# Patient Record
Sex: FEMALE | Race: WHITE | NOT HISPANIC OR LATINO | Employment: OTHER | URBAN - METROPOLITAN AREA
[De-identification: names, ages, dates, MRNs, and addresses within clinical notes are randomized per-mention and may not be internally consistent; named-entity substitution may affect disease eponyms.]

---

## 2023-09-04 PROBLEM — M19.90 OSTEOARTHRITIS: Status: ACTIVE | Noted: 2023-09-04

## 2023-09-04 PROBLEM — E78.5 HYPERLIPIDEMIA: Status: ACTIVE | Noted: 2023-09-04

## 2023-09-04 PROBLEM — M10.9 GOUT: Status: ACTIVE | Noted: 2023-09-04

## 2023-09-04 PROBLEM — N95.0 POSTMENOPAUSAL BLEEDING: Status: ACTIVE | Noted: 2023-09-04

## 2023-09-04 PROBLEM — N95.1 MENOPAUSAL STATE: Status: ACTIVE | Noted: 2023-09-04

## 2023-09-04 PROBLEM — I10 BENIGN ESSENTIAL HYPERTENSION: Status: ACTIVE | Noted: 2023-09-04

## 2023-09-04 PROBLEM — J45.901 EXACERBATION OF ASTHMA (HHS-HCC): Status: ACTIVE | Noted: 2023-09-04

## 2023-09-04 PROBLEM — N81.11 CYSTOCELE, MIDLINE: Status: ACTIVE | Noted: 2023-09-04

## 2023-09-04 PROBLEM — E03.9 HYPOTHYROIDISM: Status: ACTIVE | Noted: 2023-09-04

## 2023-09-04 PROBLEM — E78.5 DYSLIPIDEMIA: Status: ACTIVE | Noted: 2023-09-04

## 2023-09-04 PROBLEM — E55.9 VITAMIN D DEFICIENCY: Status: ACTIVE | Noted: 2023-09-04

## 2023-09-04 PROBLEM — N81.4 UTEROVAGINAL PROLAPSE: Status: ACTIVE | Noted: 2023-09-04

## 2023-09-04 RX ORDER — BUDESONIDE AND FORMOTEROL FUMARATE DIHYDRATE 160; 4.5 UG/1; UG/1
2 AEROSOL RESPIRATORY (INHALATION)
COMMUNITY

## 2023-09-04 RX ORDER — TRIAMTERENE AND HYDROCHLOROTHIAZIDE 75; 50 MG/1; MG/1
0.5 TABLET ORAL DAILY
COMMUNITY
Start: 2014-04-16 | End: 2023-12-07

## 2023-09-04 RX ORDER — METOPROLOL TARTRATE 50 MG/1
50 TABLET ORAL 2 TIMES DAILY
COMMUNITY

## 2023-09-04 RX ORDER — HYDROCODONE BITARTRATE AND ACETAMINOPHEN 5; 325 MG/1; MG/1
1 TABLET ORAL EVERY 6 HOURS PRN
COMMUNITY
End: 2023-12-19 | Stop reason: ENTERED-IN-ERROR

## 2023-09-04 RX ORDER — DOCUSATE SODIUM 100 MG/1
100 CAPSULE, LIQUID FILLED ORAL 2 TIMES DAILY
COMMUNITY
End: 2023-12-19 | Stop reason: ENTERED-IN-ERROR

## 2023-09-04 RX ORDER — LEVOTHYROXINE SODIUM 25 UG/1
25 TABLET ORAL
COMMUNITY
End: 2024-04-17

## 2023-09-04 RX ORDER — COLCHICINE 0.6 MG/1
0.6 TABLET ORAL EVERY OTHER DAY
COMMUNITY

## 2023-09-04 RX ORDER — POTASSIUM CHLORIDE 20 MEQ/1
20 TABLET, EXTENDED RELEASE ORAL DAILY
COMMUNITY
Start: 2020-12-15 | End: 2023-12-28 | Stop reason: SDUPTHER

## 2023-09-04 RX ORDER — IPRATROPIUM BROMIDE 42 UG/1
1 SPRAY, METERED NASAL 3 TIMES DAILY
COMMUNITY

## 2023-09-04 RX ORDER — ALBUTEROL SULFATE 0.83 MG/ML
SOLUTION RESPIRATORY (INHALATION) EVERY 6 HOURS
COMMUNITY

## 2023-09-04 RX ORDER — FEBUXOSTAT 80 MG/1
80 TABLET, FILM COATED ORAL 3 TIMES WEEKLY
COMMUNITY
End: 2023-12-19 | Stop reason: ENTERED-IN-ERROR

## 2023-09-04 RX ORDER — IBUPROFEN 200 MG
3 TABLET ORAL EVERY 6 HOURS PRN
COMMUNITY

## 2023-09-04 RX ORDER — NYSTATIN 100000 [USP'U]/ML
4 SUSPENSION ORAL 4 TIMES DAILY
COMMUNITY
Start: 2021-04-15 | End: 2023-12-19 | Stop reason: ENTERED-IN-ERROR

## 2023-09-04 RX ORDER — SIMVASTATIN 40 MG/1
40 TABLET, FILM COATED ORAL NIGHTLY
COMMUNITY
End: 2024-02-23

## 2023-12-15 ENCOUNTER — LAB (OUTPATIENT)
Dept: LAB | Facility: LAB | Age: 78
End: 2023-12-15
Payer: MEDICARE

## 2023-12-15 DIAGNOSIS — M10.9 GOUT, UNSPECIFIED: Primary | ICD-10-CM

## 2023-12-15 LAB
ANION GAP SERPL CALC-SCNC: 13 MMOL/L
BUN SERPL-MCNC: 19 MG/DL (ref 8–25)
CALCIUM SERPL-MCNC: 10.1 MG/DL (ref 8.5–10.4)
CHLORIDE SERPL-SCNC: 104 MMOL/L (ref 97–107)
CO2 SERPL-SCNC: 26 MMOL/L (ref 24–31)
CREAT SERPL-MCNC: 1.2 MG/DL (ref 0.4–1.6)
GFR SERPL CREATININE-BSD FRML MDRD: 47 ML/MIN/1.73M*2
GLUCOSE SERPL-MCNC: 107 MG/DL (ref 65–99)
POTASSIUM SERPL-SCNC: 4 MMOL/L (ref 3.4–5.1)
SODIUM SERPL-SCNC: 143 MMOL/L (ref 133–145)
URATE SERPL-MCNC: 5.4 MG/DL (ref 2.5–6.8)

## 2023-12-15 PROCEDURE — 36415 COLL VENOUS BLD VENIPUNCTURE: CPT

## 2023-12-15 PROCEDURE — 84550 ASSAY OF BLOOD/URIC ACID: CPT

## 2023-12-15 PROCEDURE — 80048 BASIC METABOLIC PNL TOTAL CA: CPT

## 2023-12-17 ASSESSMENT — ENCOUNTER SYMPTOMS
SHORTNESS OF BREATH: 0
ABDOMINAL PAIN: 0
FEVER: 0

## 2023-12-17 NOTE — PROGRESS NOTES
[facility]: [facility]  PROGRESS NOTE      Irma Baumann is a 78 y.o. female that is presenting today for Follow-up (Needs med refills).    Assessment/Plan   Diagnoses and all orders for this visit:  Benign essential hypertension  -     CBC and Auto Differential; Future  -     Comprehensive Metabolic Panel; Future  -     Lipid Panel; Future  -     TSH with reflex to Free T4 if abnormal; Future  Mixed hyperlipidemia  -     Lipid Panel; Future  Other secondary gout, unspecified chronicity, unspecified site  Hypothyroidism, unspecified type  -     TSH with reflex to Free T4 if abnormal; Future  Vitamin D deficiency  -     Vitamin D 25-Hydroxy,Total (for eval of Vitamin D levels); Future  Osteoarthritis, unspecified osteoarthritis type, unspecified site  Hyperglycemia  -     Hemoglobin A1C; Future  Encounter for screening mammogram for breast cancer  Asthma, unspecified asthma severity, unspecified whether complicated, unspecified whether persistent  -     predniSONE (Deltasone) 10 mg tablet; Take 5 tablets (50 mg) by mouth once daily for 2 days, THEN 4 tablets (40 mg) once daily for 2 days, THEN 3 tablets (30 mg) once daily for 2 days, THEN 2 tablets (20 mg) once daily for 2 days, THEN 1 tablet (10 mg) once daily for 2 days.  Other orders  -     Follow Up In Primary Care - Medicare Annual; Future    Continue meds as is.    Lost her allergist - I can refill the inhalers and she usually keeps the steroid on  hand in case of a flare of asthma, will give Rx      Subjective   HPI  78 y.o. female here for follow up.  Doing well - no gout flares.    Review of Systems   Constitutional:  Negative for fever.   Respiratory:  Negative for shortness of breath.    Cardiovascular:  Negative for chest pain.   Gastrointestinal:  Negative for abdominal pain.   All other systems reviewed and are negative.     Objective   Vitals:    12/19/23 0927   BP: 112/80   Pulse: 54   SpO2: 98%      Body mass index is 29.74  "kg/m².  Physical Exam  Vitals reviewed.   Constitutional:       Appearance: Normal appearance.   Cardiovascular:      Rate and Rhythm: Normal rate and regular rhythm.      Heart sounds: No murmur heard.  Pulmonary:      Breath sounds: Normal breath sounds. No wheezing, rhonchi or rales.   Musculoskeletal:      Right lower leg: No edema.      Left lower leg: No edema.       Diagnostic Results   Lab Results   Component Value Date    GLUCOSE 107 (H) 12/15/2023    CALCIUM 10.1 12/15/2023     12/15/2023    K 4.0 12/15/2023    CO2 26 12/15/2023     12/15/2023    BUN 19 12/15/2023    CREATININE 1.20 12/15/2023     Lab Results   Component Value Date    ALT 17 06/15/2023    AST 22 06/15/2023    ALKPHOS 92 06/15/2023    BILITOT 0.7 06/15/2023     Lab Results   Component Value Date    WBC 7.2 06/15/2023    HGB 15.5 (H) 06/15/2023    HCT 46.9 (H) 06/15/2023    MCV 88.2 06/15/2023     06/15/2023     Lab Results   Component Value Date    CHOL 118 (L) 06/15/2023    CHOL 111 (L) 06/13/2022    CHOL 122 (L) 06/26/2021     Lab Results   Component Value Date    HDL 44 (L) 06/15/2023    HDL 43 (L) 06/13/2022    HDL 43 (L) 06/26/2021     Lab Results   Component Value Date    LDLCALC 47 (L) 06/15/2023    LDLCALC 46 (L) 06/13/2022    LDLCALC 56 (L) 06/26/2021     Lab Results   Component Value Date    TRIG 134 06/15/2023    TRIG 109 06/13/2022    TRIG 116 06/26/2021     No components found for: \"CHOLHDL\"  Lab Results   Component Value Date    HGBA1C 5.8 06/13/2022     Other labs not included in the list above were reviewed either before or during this encounter.    History    Past Medical History:   Diagnosis Date    Personal history of other diseases of the circulatory system     History of hypertension    Personal history of other diseases of the respiratory system     History of asthma     Past Surgical History:   Procedure Laterality Date    OTHER SURGICAL HISTORY  11/30/2022    Hysterectomy    OTHER SURGICAL HISTORY  " 11/30/2022    Sinus surgery     Family History   Problem Relation Name Age of Onset    Hypertension Mother      Other (cva) Mother      Diabetes Mother      Stroke Mother      Hypertension Father      Other (cva) Father      Stroke Father      Diabetes Sister      Heart disease Brother      No Known Problems Son      No Known Problems Son       Social History     Socioeconomic History    Marital status:      Spouse name: Not on file    Number of children: Not on file    Years of education: Not on file    Highest education level: Not on file   Occupational History    Not on file   Tobacco Use    Smoking status: Never    Smokeless tobacco: Never   Vaping Use    Vaping Use: Never used   Substance and Sexual Activity    Alcohol use: Yes     Comment: sometimes    Drug use: Never    Sexual activity: Not on file   Other Topics Concern    Not on file   Social History Narrative    Not on file     Social Determinants of Health     Financial Resource Strain: Not on file   Food Insecurity: Not on file   Transportation Needs: Not on file   Physical Activity: Not on file   Stress: Not on file   Social Connections: Not on file   Intimate Partner Violence: Not on file   Housing Stability: Not on file     Allergies   Allergen Reactions    Levofloxacin Other     Reaction: Muscle Pain/myalgia    Allopurinol Other     Reaction: erytheme    Cephalexin Unknown    Ciprofloxacin Unknown    Sulfamethoxazole-Trimethoprim Unknown    Colchicine Other     Reaction: Skin irritation    Penicillins Rash     Reaction: Hives     Current Outpatient Medications on File Prior to Visit   Medication Sig Dispense Refill    albuterol 108 (90 Base) MCG/ACT inhaler Inhale 2 puffs every 6 hours if needed.      albuterol 2.5 mg /3 mL (0.083 %) nebulizer solution every 6 hours.  3 ml as needed Inhalation      budesonide-formoteroL (Symbicort) 160-4.5 mcg/actuation inhaler Inhale 2 puffs 2 times a day.      cholecalciferol, vitamin D3, (VITAMIN D3 ORAL)  Take 1 capsule by mouth once daily.      colchicine, gout, (Colcrys) 0.6 mg tablet Take 1 tablet (0.6 mg) by mouth every other day. Gout      ibuprofen (AdviL) 200 mg tablet Take 3 tablets (600 mg) by mouth every 6 hours if needed (pain).      ipratropium (Atrovent) 42 mcg (0.06 %) nasal spray 1 spray 3 times a day.      levothyroxine (Synthroid, Levoxyl) 25 mcg tablet Take 1 tablet (25 mcg) by mouth once daily in the morning. Take before meals.      metoprolol tartrate (Lopressor) 50 mg tablet Take 1 tablet by mouth 2 times a day.      potassium chloride CR (Klor-Con M20) 20 mEq ER tablet Take 1 tablet (20 mEq) by mouth once daily. With food      simvastatin (Zocor) 40 mg tablet Take 1 tablet (40 mg) by mouth once daily at bedtime.      triamterene-hydrochlorothiazid (Maxzide) 75-50 mg tablet TAKE ONE-HALF TABLET BY MOUTH  ONCE DAILY 45 tablet 3    [DISCONTINUED] docusate sodium (Colace) 100 mg capsule Take 1 capsule (100 mg) by mouth 2 times a day.      [DISCONTINUED] febuxostat (Uloric) 80 mg tablet Take 1 tablet (80 mg) by mouth 3 times a week.      [DISCONTINUED] HYDROcodone-acetaminophen (Norco) 5-325 mg tablet Take 1 tablet by mouth every 6 hours if needed (pain).      [DISCONTINUED] nystatin (Mycostatin) 100,000 unit/mL suspension 4 mL (400,000 Units) 4 times a day. Mouth/Throat       No current facility-administered medications on file prior to visit.     Immunization History   Administered Date(s) Administered    Flu vaccine, quadrivalent, high-dose, preservative free, age 65y+ (FLUZONE) 10/18/2021, 10/18/2022    Influenza, High Dose Seasonal, Preservative Free 10/22/2012, 09/19/2016, 09/21/2017, 09/29/2017, 10/04/2018    Influenza, Seasonal, Quadrivalent, Adjuvanted 10/02/2020    Influenza, seasonal, injectable 10/31/2008, 10/12/2010, 10/06/2011, 10/07/2013, 10/13/2014, 10/01/2015    Influenza, trivalent, adjuvanted 10/11/2018, 10/16/2019    Pfizer COVID-19 vaccine, bivalent, age 12 years and older (30  mcg/0.3 mL) 09/26/2022    Pfizer Gray Cap SARS-CoV-2 05/16/2022    Pfizer Purple Cap SARS-CoV-2 02/05/2021, 02/28/2021, 10/04/2021    Pneumococcal conjugate vaccine, 13-valent (PREVNAR 13) 10/01/2015    Pneumococcal polysaccharide vaccine, 23-valent, age 2 years and older (PNEUMOVAX 23) 12/31/2003, 04/07/2014     Patient's medical history was reviewed and updated either before or during this encounter.       Toro Everett MD

## 2023-12-19 ENCOUNTER — OFFICE VISIT (OUTPATIENT)
Dept: PRIMARY CARE | Facility: CLINIC | Age: 78
End: 2023-12-19
Payer: MEDICARE

## 2023-12-19 VITALS
SYSTOLIC BLOOD PRESSURE: 112 MMHG | WEIGHT: 160 LBS | HEART RATE: 54 BPM | DIASTOLIC BLOOD PRESSURE: 80 MMHG | OXYGEN SATURATION: 98 % | BODY MASS INDEX: 29.74 KG/M2

## 2023-12-19 DIAGNOSIS — M19.90 OSTEOARTHRITIS, UNSPECIFIED OSTEOARTHRITIS TYPE, UNSPECIFIED SITE: ICD-10-CM

## 2023-12-19 DIAGNOSIS — Z12.31 ENCOUNTER FOR SCREENING MAMMOGRAM FOR BREAST CANCER: ICD-10-CM

## 2023-12-19 DIAGNOSIS — J45.909 ASTHMA, UNSPECIFIED ASTHMA SEVERITY, UNSPECIFIED WHETHER COMPLICATED, UNSPECIFIED WHETHER PERSISTENT (HHS-HCC): ICD-10-CM

## 2023-12-19 DIAGNOSIS — E78.2 MIXED HYPERLIPIDEMIA: ICD-10-CM

## 2023-12-19 DIAGNOSIS — E55.9 VITAMIN D DEFICIENCY: ICD-10-CM

## 2023-12-19 DIAGNOSIS — I10 BENIGN ESSENTIAL HYPERTENSION: Primary | ICD-10-CM

## 2023-12-19 DIAGNOSIS — R73.9 HYPERGLYCEMIA: ICD-10-CM

## 2023-12-19 DIAGNOSIS — E03.9 HYPOTHYROIDISM, UNSPECIFIED TYPE: ICD-10-CM

## 2023-12-19 DIAGNOSIS — M10.40 OTHER SECONDARY GOUT, UNSPECIFIED CHRONICITY, UNSPECIFIED SITE: ICD-10-CM

## 2023-12-19 PROCEDURE — 1159F MED LIST DOCD IN RCRD: CPT | Performed by: INTERNAL MEDICINE

## 2023-12-19 PROCEDURE — 1126F AMNT PAIN NOTED NONE PRSNT: CPT | Performed by: INTERNAL MEDICINE

## 2023-12-19 PROCEDURE — 99214 OFFICE O/P EST MOD 30 MIN: CPT | Performed by: INTERNAL MEDICINE

## 2023-12-19 PROCEDURE — 3079F DIAST BP 80-89 MM HG: CPT | Performed by: INTERNAL MEDICINE

## 2023-12-19 PROCEDURE — 1157F ADVNC CARE PLAN IN RCRD: CPT | Performed by: INTERNAL MEDICINE

## 2023-12-19 PROCEDURE — 1036F TOBACCO NON-USER: CPT | Performed by: INTERNAL MEDICINE

## 2023-12-19 PROCEDURE — 1160F RVW MEDS BY RX/DR IN RCRD: CPT | Performed by: INTERNAL MEDICINE

## 2023-12-19 PROCEDURE — 3074F SYST BP LT 130 MM HG: CPT | Performed by: INTERNAL MEDICINE

## 2023-12-19 RX ORDER — PREDNISONE 10 MG/1
TABLET ORAL
Qty: 30 TABLET | Refills: 0 | Status: SHIPPED | OUTPATIENT
Start: 2023-12-19 | End: 2023-12-29

## 2023-12-19 ASSESSMENT — ENCOUNTER SYMPTOMS
OCCASIONAL FEELINGS OF UNSTEADINESS: 1
LOSS OF SENSATION IN FEET: 0
DEPRESSION: 0

## 2023-12-19 ASSESSMENT — PATIENT HEALTH QUESTIONNAIRE - PHQ9
1. LITTLE INTEREST OR PLEASURE IN DOING THINGS: NOT AT ALL
SUM OF ALL RESPONSES TO PHQ9 QUESTIONS 1 AND 2: 0
2. FEELING DOWN, DEPRESSED OR HOPELESS: NOT AT ALL

## 2023-12-19 ASSESSMENT — PAIN SCALES - GENERAL: PAINLEVEL: 0-NO PAIN

## 2023-12-28 DIAGNOSIS — I10 BENIGN ESSENTIAL HYPERTENSION: ICD-10-CM

## 2023-12-28 RX ORDER — POTASSIUM CHLORIDE 20 MEQ/1
20 TABLET, EXTENDED RELEASE ORAL DAILY
Qty: 90 TABLET | Refills: 3 | Status: SHIPPED | OUTPATIENT
Start: 2023-12-28 | End: 2024-12-27

## 2023-12-28 RX ORDER — TRIAMTERENE AND HYDROCHLOROTHIAZIDE 75; 50 MG/1; MG/1
0.5 TABLET ORAL DAILY
Qty: 45 TABLET | Refills: 3 | Status: SHIPPED | OUTPATIENT
Start: 2023-12-28 | End: 2024-12-27

## 2024-02-22 DIAGNOSIS — I10 BENIGN ESSENTIAL HYPERTENSION: Primary | ICD-10-CM

## 2024-02-23 RX ORDER — SIMVASTATIN 40 MG/1
40 TABLET, FILM COATED ORAL EVERY EVENING
Qty: 90 TABLET | Refills: 3 | Status: SHIPPED | OUTPATIENT
Start: 2024-02-23

## 2024-04-16 DIAGNOSIS — E03.9 HYPOTHYROIDISM, UNSPECIFIED TYPE: Primary | ICD-10-CM

## 2024-04-17 RX ORDER — LEVOTHYROXINE SODIUM 25 UG/1
25 TABLET ORAL
Qty: 90 TABLET | Refills: 3 | Status: SHIPPED | OUTPATIENT
Start: 2024-04-17

## 2024-05-10 ENCOUNTER — LAB (OUTPATIENT)
Dept: LAB | Facility: LAB | Age: 79
End: 2024-05-10
Payer: MEDICARE

## 2024-05-10 DIAGNOSIS — I10 BENIGN ESSENTIAL HYPERTENSION: ICD-10-CM

## 2024-05-10 DIAGNOSIS — R73.9 HYPERGLYCEMIA: ICD-10-CM

## 2024-05-10 DIAGNOSIS — E55.9 VITAMIN D DEFICIENCY: ICD-10-CM

## 2024-05-10 DIAGNOSIS — M10.40 OTHER SECONDARY GOUT, UNSPECIFIED CHRONICITY, UNSPECIFIED SITE: ICD-10-CM

## 2024-05-10 DIAGNOSIS — E03.9 HYPOTHYROIDISM, UNSPECIFIED TYPE: ICD-10-CM

## 2024-05-10 DIAGNOSIS — E78.2 MIXED HYPERLIPIDEMIA: ICD-10-CM

## 2024-05-10 LAB
25(OH)D3 SERPL-MCNC: 30 NG/ML (ref 31–100)
ALBUMIN SERPL-MCNC: 4.4 G/DL (ref 3.5–5)
ALP BLD-CCNC: 89 U/L (ref 35–125)
ALT SERPL-CCNC: 15 U/L (ref 5–40)
ANION GAP SERPL CALC-SCNC: 15 MMOL/L
AST SERPL-CCNC: 21 U/L (ref 5–40)
BASOPHILS # BLD AUTO: 0.04 X10*3/UL (ref 0–0.1)
BASOPHILS NFR BLD AUTO: 0.5 %
BILIRUB SERPL-MCNC: 0.5 MG/DL (ref 0.1–1.2)
BUN SERPL-MCNC: 21 MG/DL (ref 8–25)
CALCIUM SERPL-MCNC: 10 MG/DL (ref 8.5–10.4)
CHLORIDE SERPL-SCNC: 103 MMOL/L (ref 97–107)
CHOLEST SERPL-MCNC: 122 MG/DL (ref 133–200)
CHOLEST/HDLC SERPL: 2.9 {RATIO}
CO2 SERPL-SCNC: 25 MMOL/L (ref 24–31)
CREAT SERPL-MCNC: 1.1 MG/DL (ref 0.4–1.6)
EGFRCR SERPLBLD CKD-EPI 2021: 52 ML/MIN/1.73M*2
EOSINOPHIL # BLD AUTO: 0.4 X10*3/UL (ref 0–0.4)
EOSINOPHIL NFR BLD AUTO: 5.4 %
ERYTHROCYTE [DISTWIDTH] IN BLOOD BY AUTOMATED COUNT: 13.8 % (ref 11.5–14.5)
EST. AVERAGE GLUCOSE BLD GHB EST-MCNC: 123 MG/DL
GLUCOSE SERPL-MCNC: 100 MG/DL (ref 65–99)
HBA1C MFR BLD: 5.9 %
HCT VFR BLD AUTO: 46.7 % (ref 36–46)
HDLC SERPL-MCNC: 42 MG/DL
HGB BLD-MCNC: 15.4 G/DL (ref 12–16)
IMM GRANULOCYTES # BLD AUTO: 0.02 X10*3/UL (ref 0–0.5)
IMM GRANULOCYTES NFR BLD AUTO: 0.3 % (ref 0–0.9)
LDLC SERPL CALC-MCNC: 54 MG/DL (ref 65–130)
LYMPHOCYTES # BLD AUTO: 2.2 X10*3/UL (ref 0.8–3)
LYMPHOCYTES NFR BLD AUTO: 29.7 %
MCH RBC QN AUTO: 28.8 PG (ref 26–34)
MCHC RBC AUTO-ENTMCNC: 33 G/DL (ref 32–36)
MCV RBC AUTO: 88 FL (ref 80–100)
MONOCYTES # BLD AUTO: 0.54 X10*3/UL (ref 0.05–0.8)
MONOCYTES NFR BLD AUTO: 7.3 %
NEUTROPHILS # BLD AUTO: 4.2 X10*3/UL (ref 1.6–5.5)
NEUTROPHILS NFR BLD AUTO: 56.8 %
NRBC BLD-RTO: 0 /100 WBCS (ref 0–0)
PLATELET # BLD AUTO: 283 X10*3/UL (ref 150–450)
POTASSIUM SERPL-SCNC: 3.9 MMOL/L (ref 3.4–5.1)
PROT SERPL-MCNC: 7.1 G/DL (ref 5.9–7.9)
RBC # BLD AUTO: 5.34 X10*6/UL (ref 4–5.2)
SODIUM SERPL-SCNC: 143 MMOL/L (ref 133–145)
TRIGL SERPL-MCNC: 131 MG/DL (ref 40–150)
TSH SERPL DL<=0.05 MIU/L-ACNC: 4.12 MIU/L (ref 0.27–4.2)
WBC # BLD AUTO: 7.4 X10*3/UL (ref 4.4–11.3)

## 2024-05-10 PROCEDURE — 84550 ASSAY OF BLOOD/URIC ACID: CPT

## 2024-05-10 PROCEDURE — 85025 COMPLETE CBC W/AUTO DIFF WBC: CPT

## 2024-05-10 PROCEDURE — 84443 ASSAY THYROID STIM HORMONE: CPT

## 2024-05-10 PROCEDURE — 36415 COLL VENOUS BLD VENIPUNCTURE: CPT

## 2024-05-10 PROCEDURE — 80061 LIPID PANEL: CPT

## 2024-05-10 PROCEDURE — 82306 VITAMIN D 25 HYDROXY: CPT

## 2024-05-10 PROCEDURE — 83036 HEMOGLOBIN GLYCOSYLATED A1C: CPT

## 2024-05-10 PROCEDURE — 80053 COMPREHEN METABOLIC PANEL: CPT

## 2024-05-13 LAB — URATE SERPL-MCNC: 5.4 MG/DL (ref 2.5–6.8)

## 2024-05-13 ASSESSMENT — ENCOUNTER SYMPTOMS
DIARRHEA: 0
FEVER: 0
APPETITE CHANGE: 0
VOMITING: 0
CHILLS: 0
COUGH: 0
HEADACHES: 0
ABDOMINAL PAIN: 0
SHORTNESS OF BREATH: 0
NAUSEA: 0

## 2024-05-13 NOTE — PROGRESS NOTES
[facility]: [facility]  MEDICARE WELLNESS EXAM      Irma Baumann is a 78 y.o. female that is presenting today for Annual Exam (CPE).    Assessment/Plan    Diagnoses and all orders for this visit:  Annual physical exam  Benign essential hypertension  Mixed hyperlipidemia  -     CBC and Auto Differential; Future  -     Comprehensive Metabolic Panel; Future  -     Lipid Panel; Future  -     TSH with reflex to Free T4 if abnormal; Future  Hypothyroidism, unspecified type  Other secondary gout, unspecified chronicity, unspecified site  -     Uric Acid; Future  -     Uric acid; Future  Asthma, unspecified asthma severity, unspecified whether complicated, unspecified whether persistent ([facility])  Vitamin D deficiency  -     Vitamin D 25-Hydroxy,Total (for eval of Vitamin D levels); Future  Encounter for routine laboratory testing  -     CBC and Auto Differential; Future  -     Comprehensive Metabolic Panel; Future  -     Lipid Panel; Future  -     Hemoglobin A1C; Future  -     Vitamin D 25-Hydroxy,Total (for eval of Vitamin D levels); Future  -     TSH with reflex to Free T4 if abnormal; Future  -     Uric Acid; Future  Hyperglycemia  -     Hemoglobin A1C; Future  Gout, unspecified cause, unspecified chronicity, unspecified site  -     febuxostat (Uloric) 80 mg tablet; Take 1 tablet (80 mg) by mouth once daily.  Dermatitis  -     triamcinolone (Kenalog) 0.5 % cream; Apply topically 2 times a day.  Other orders  -     Follow Up In Primary Care - Medicare Annual  -     Follow Up In Primary Care - Medicare Annual; Future    ADVANCED CARE PLANNING  Advanced Care Planning was discussed with patient:  The patient has an active advanced care plan on file. The patient has an active surrogate decision-maker on file.  Encouraged the patient to confirm that Living Will and Healthcare Power of  (HCPoA) are accurate and up to date.  Encouraged the patient to confirm that our office be provided a copy  of any documentation in the event that anything changes.    ACTIVITIES OF DAILY LIVING  Basic ADLs:  Bathing: Independent, Dressing: Independent, Toileting: Independent, Transferring: Independent, Continence: Independent, Feeding: Independent.    Instrumental ADLs:  Ability to use phone: Independent, Shopping: Independent, Cooking: Independent, House-keeping: Independent, Laundry: Independent, Transportation: Independent, Medication Management: Independent, Finance Management: Independent.    Subjective   HPI  Review of Systems   Constitutional:  Negative for appetite change, chills and fever.   Respiratory:  Negative for cough and shortness of breath.    Cardiovascular:  Negative for chest pain.   Gastrointestinal:  Negative for abdominal pain, diarrhea, nausea and vomiting.   Neurological:  Negative for headaches.   All other systems reviewed and are negative.    Objective   Vitals:    05/14/24 1112   BP: 128/68   Pulse: 55   Temp: 36.2 °C (97.1 °F)   SpO2: 96%      Body mass index is 29.85 kg/m².  Physical Exam  Vitals reviewed.   Constitutional:       General: She is not in acute distress.     Appearance: She is not toxic-appearing.   HENT:      Head: Normocephalic and atraumatic.      Mouth/Throat:      Mouth: Mucous membranes are moist.   Eyes:      Pupils: Pupils are equal, round, and reactive to light.   Cardiovascular:      Rate and Rhythm: Normal rate and regular rhythm.      Heart sounds: No murmur heard.  Pulmonary:      Breath sounds: Normal breath sounds. No wheezing, rhonchi or rales.   Abdominal:      General: There is no distension.      Palpations: Abdomen is soft.   Musculoskeletal:      Right lower leg: No edema.      Left lower leg: No edema.   Neurological:      General: No focal deficit present.      Mental Status: She is alert and oriented to person, place, and time.       Diagnostic Results   Lab Results   Component Value Date    GLUCOSE 100 (H) 05/10/2024    CALCIUM 10.0 05/10/2024    NA  "143 05/10/2024    K 3.9 05/10/2024    CO2 25 05/10/2024     05/10/2024    BUN 21 05/10/2024    CREATININE 1.10 05/10/2024     Lab Results   Component Value Date    ALT 15 05/10/2024    AST 21 05/10/2024    ALKPHOS 89 05/10/2024    BILITOT 0.5 05/10/2024     Lab Results   Component Value Date    WBC 7.4 05/10/2024    HGB 15.4 05/10/2024    HCT 46.7 (H) 05/10/2024    MCV 88 05/10/2024     05/10/2024     Lab Results   Component Value Date    CHOL 122 (L) 05/10/2024    CHOL 118 (L) 06/15/2023    CHOL 111 (L) 06/13/2022     Lab Results   Component Value Date    HDL 42.0 (L) 05/10/2024    HDL 44 (L) 06/15/2023    HDL 43 (L) 06/13/2022     Lab Results   Component Value Date    LDLCALC 54 (L) 05/10/2024    LDLCALC 47 (L) 06/15/2023    LDLCALC 46 (L) 06/13/2022     Lab Results   Component Value Date    TRIG 131 05/10/2024    TRIG 134 06/15/2023    TRIG 109 06/13/2022     No components found for: \"CHOLHDL\"  Lab Results   Component Value Date    HGBA1C 5.9 (H) 05/10/2024     Other labs not included in the list above reviewed either before or during this encounter.    History   Past Medical History:   Diagnosis Date    Personal history of other diseases of the circulatory system     History of hypertension    Personal history of other diseases of the respiratory system     History of asthma     Past Surgical History:   Procedure Laterality Date    OTHER SURGICAL HISTORY  11/30/2022    Hysterectomy    OTHER SURGICAL HISTORY  11/30/2022    Sinus surgery     Family History   Problem Relation Name Age of Onset    Hypertension Mother      Other (cva) Mother      Diabetes Mother      Stroke Mother      Hypertension Father      Other (cva) Father      Stroke Father      Diabetes Sister      Heart disease Brother      No Known Problems Son      No Known Problems Son       Social History     Socioeconomic History    Marital status:      Spouse name: Not on file    Number of children: Not on file    Years of " education: Not on file    Highest education level: Not on file   Occupational History    Not on file   Tobacco Use    Smoking status: Never     Passive exposure: Never    Smokeless tobacco: Never   Vaping Use    Vaping status: Never Used   Substance and Sexual Activity    Alcohol use: Yes     Comment: sometimes    Drug use: Never    Sexual activity: Not on file   Other Topics Concern    Not on file   Social History Narrative    Not on file     Social Determinants of Health     Financial Resource Strain: Not on file   Food Insecurity: Not on file   Transportation Needs: Not on file   Physical Activity: Not on file   Stress: Not on file   Social Connections: Not on file   Intimate Partner Violence: Not on file   Housing Stability: Not on file     Allergies   Allergen Reactions    Levofloxacin Other     Reaction: Muscle Pain/myalgia    Allopurinol Other     Reaction: erytheme    Cephalexin Unknown    Ciprofloxacin Unknown    Sulfamethoxazole-Trimethoprim Unknown    Colchicine Other     Reaction: Skin irritation    Penicillins Rash     Reaction: Hives     Current Outpatient Medications on File Prior to Visit   Medication Sig Dispense Refill    albuterol 108 (90 Base) MCG/ACT inhaler Inhale 2 puffs every 6 hours if needed.      albuterol 2.5 mg /3 mL (0.083 %) nebulizer solution every 6 hours.  3 ml as needed Inhalation      budesonide-formoteroL (Symbicort) 160-4.5 mcg/actuation inhaler Inhale 2 puffs 2 times a day.      cholecalciferol, vitamin D3, (VITAMIN D3 ORAL) Take 1 capsule by mouth once daily.      colchicine, gout, (Colcrys) 0.6 mg tablet Take 1 tablet (0.6 mg) by mouth every other day. Gout      econazole nitrate 1 % cream APPLY TO AFFECTED AREAS UNDER BREASTS TWICE PER DAY AS NEEDED FOR FLARES      ibuprofen (AdviL) 200 mg tablet Take 3 tablets (600 mg) by mouth every 6 hours if needed (pain).      ipratropium (Atrovent) 42 mcg (0.06 %) nasal spray 1 spray 3 times a day.      levothyroxine (Synthroid,  Levoxyl) 25 mcg tablet TAKE 1 TABLET BY MOUTH IN THE  MORNING ON AN EMPTY STOMACH 90 tablet 3    metoprolol tartrate (Lopressor) 50 mg tablet Take 1 tablet by mouth 2 times a day.      potassium chloride CR (Klor-Con M20) 20 mEq ER tablet Take 1 tablet (20 mEq) by mouth once daily. With food 90 tablet 3    simvastatin (Zocor) 40 mg tablet TAKE 1 TABLET BY MOUTH ONCE  DAILY IN THE EVENING 90 tablet 3    triamterene-hydrochlorothiazid (Maxzide) 75-50 mg tablet Take 0.5 tablets by mouth once daily. 45 tablet 3    [DISCONTINUED] febuxostat (Uloric) 80 mg tablet Take 1 tablet (80 mg) by mouth once daily.       No current facility-administered medications on file prior to visit.     Immunization History   Administered Date(s) Administered    Flu vaccine, quadrivalent, high-dose, preservative free, age 65y+ (FLUZONE) 10/18/2021, 10/18/2022    Influenza, High Dose Seasonal, Preservative Free 10/22/2012, 09/19/2016, 09/21/2017, 09/29/2017, 10/04/2018    Influenza, Seasonal, Quadrivalent, Adjuvanted 10/02/2020, 10/19/2023    Influenza, seasonal, injectable 10/31/2008, 10/12/2010, 10/06/2011, 10/07/2013, 10/13/2014, 10/01/2015    Influenza, trivalent, adjuvanted 10/11/2018, 10/16/2019    Pfizer COVID-19 vaccine, bivalent, age 12 years and older (30 mcg/0.3 mL) 09/26/2022    Pfizer Gray Cap SARS-CoV-2 05/16/2022    Pfizer Purple Cap SARS-CoV-2 02/05/2021, 02/28/2021, 10/04/2021    Pneumococcal conjugate vaccine, 13-valent (PREVNAR 13) 10/01/2015    Pneumococcal polysaccharide vaccine, 23-valent, age 2 years and older (PNEUMOVAX 23) 12/31/2003, 04/07/2014     Patient's medical history was reviewed and updated either before or during this encounter.     Toro Everett MD

## 2024-05-14 ENCOUNTER — OFFICE VISIT (OUTPATIENT)
Dept: PRIMARY CARE | Facility: CLINIC | Age: 79
End: 2024-05-14
Payer: MEDICARE

## 2024-05-14 VITALS
WEIGHT: 158 LBS | DIASTOLIC BLOOD PRESSURE: 68 MMHG | SYSTOLIC BLOOD PRESSURE: 128 MMHG | HEIGHT: 61 IN | OXYGEN SATURATION: 96 % | BODY MASS INDEX: 29.83 KG/M2 | TEMPERATURE: 97.1 F | HEART RATE: 55 BPM

## 2024-05-14 DIAGNOSIS — E55.9 VITAMIN D DEFICIENCY: ICD-10-CM

## 2024-05-14 DIAGNOSIS — J45.909 ASTHMA, UNSPECIFIED ASTHMA SEVERITY, UNSPECIFIED WHETHER COMPLICATED, UNSPECIFIED WHETHER PERSISTENT (HHS-HCC): ICD-10-CM

## 2024-05-14 DIAGNOSIS — E03.9 HYPOTHYROIDISM, UNSPECIFIED TYPE: ICD-10-CM

## 2024-05-14 DIAGNOSIS — I10 BENIGN ESSENTIAL HYPERTENSION: ICD-10-CM

## 2024-05-14 DIAGNOSIS — Z01.89 ENCOUNTER FOR ROUTINE LABORATORY TESTING: ICD-10-CM

## 2024-05-14 DIAGNOSIS — E78.2 MIXED HYPERLIPIDEMIA: ICD-10-CM

## 2024-05-14 DIAGNOSIS — M10.40 OTHER SECONDARY GOUT, UNSPECIFIED CHRONICITY, UNSPECIFIED SITE: ICD-10-CM

## 2024-05-14 DIAGNOSIS — R73.9 HYPERGLYCEMIA: ICD-10-CM

## 2024-05-14 DIAGNOSIS — M10.9 GOUT, UNSPECIFIED CAUSE, UNSPECIFIED CHRONICITY, UNSPECIFIED SITE: ICD-10-CM

## 2024-05-14 DIAGNOSIS — Z00.00 ANNUAL PHYSICAL EXAM: Primary | ICD-10-CM

## 2024-05-14 DIAGNOSIS — L30.9 DERMATITIS: ICD-10-CM

## 2024-05-14 PROCEDURE — 1036F TOBACCO NON-USER: CPT | Performed by: INTERNAL MEDICINE

## 2024-05-14 PROCEDURE — 1158F ADVNC CARE PLAN TLK DOCD: CPT | Performed by: INTERNAL MEDICINE

## 2024-05-14 PROCEDURE — 3078F DIAST BP <80 MM HG: CPT | Performed by: INTERNAL MEDICINE

## 2024-05-14 PROCEDURE — 99215 OFFICE O/P EST HI 40 MIN: CPT | Performed by: INTERNAL MEDICINE

## 2024-05-14 PROCEDURE — 1159F MED LIST DOCD IN RCRD: CPT | Performed by: INTERNAL MEDICINE

## 2024-05-14 PROCEDURE — 1123F ACP DISCUSS/DSCN MKR DOCD: CPT | Performed by: INTERNAL MEDICINE

## 2024-05-14 PROCEDURE — 3074F SYST BP LT 130 MM HG: CPT | Performed by: INTERNAL MEDICINE

## 2024-05-14 PROCEDURE — G0439 PPPS, SUBSEQ VISIT: HCPCS | Performed by: INTERNAL MEDICINE

## 2024-05-14 PROCEDURE — 1157F ADVNC CARE PLAN IN RCRD: CPT | Performed by: INTERNAL MEDICINE

## 2024-05-14 PROCEDURE — 1126F AMNT PAIN NOTED NONE PRSNT: CPT | Performed by: INTERNAL MEDICINE

## 2024-05-14 RX ORDER — FEBUXOSTAT 80 MG/1
80 TABLET, FILM COATED ORAL DAILY
COMMUNITY
End: 2024-05-14 | Stop reason: SDUPTHER

## 2024-05-14 RX ORDER — TRIAMCINOLONE ACETONIDE 5 MG/G
CREAM TOPICAL 2 TIMES DAILY
Qty: 15 G | Refills: 0 | Status: SHIPPED | OUTPATIENT
Start: 2024-05-14

## 2024-05-14 RX ORDER — FEBUXOSTAT 80 MG/1
80 TABLET, FILM COATED ORAL DAILY
Qty: 90 TABLET | Refills: 3 | Status: SHIPPED | OUTPATIENT
Start: 2024-05-14 | End: 2025-05-14

## 2024-05-14 RX ORDER — ECONAZOLE NITRATE 10 MG/G
CREAM TOPICAL
COMMUNITY
Start: 2024-04-30

## 2024-05-14 ASSESSMENT — PATIENT HEALTH QUESTIONNAIRE - PHQ9
1. LITTLE INTEREST OR PLEASURE IN DOING THINGS: NOT AT ALL
2. FEELING DOWN, DEPRESSED OR HOPELESS: NOT AT ALL
SUM OF ALL RESPONSES TO PHQ9 QUESTIONS 1 AND 2: 0

## 2024-05-14 ASSESSMENT — PAIN SCALES - GENERAL: PAINLEVEL: 0-NO PAIN

## 2024-06-25 ENCOUNTER — APPOINTMENT (OUTPATIENT)
Dept: PRIMARY CARE | Facility: CLINIC | Age: 79
End: 2024-06-25
Payer: MEDICARE

## 2024-07-20 ENCOUNTER — APPOINTMENT (OUTPATIENT)
Dept: CARDIOLOGY | Facility: HOSPITAL | Age: 79
End: 2024-07-20
Payer: MEDICARE

## 2024-07-20 ENCOUNTER — HOSPITAL ENCOUNTER (INPATIENT)
Facility: HOSPITAL | Age: 79
End: 2024-07-20
Attending: INTERNAL MEDICINE | Admitting: STUDENT IN AN ORGANIZED HEALTH CARE EDUCATION/TRAINING PROGRAM
Payer: MEDICARE

## 2024-07-20 ENCOUNTER — APPOINTMENT (OUTPATIENT)
Dept: RADIOLOGY | Facility: HOSPITAL | Age: 79
End: 2024-07-20
Payer: MEDICARE

## 2024-07-20 DIAGNOSIS — J96.00 ACUTE RESPIRATORY FAILURE DUE TO COVID-19 (MULTI): Primary | ICD-10-CM

## 2024-07-20 DIAGNOSIS — U07.1 COVID-19: ICD-10-CM

## 2024-07-20 DIAGNOSIS — J96.01 ACUTE RESPIRATORY FAILURE WITH HYPOXIA (MULTI): ICD-10-CM

## 2024-07-20 DIAGNOSIS — U07.1 ACUTE RESPIRATORY FAILURE DUE TO COVID-19 (MULTI): Primary | ICD-10-CM

## 2024-07-20 DIAGNOSIS — J18.9 PNEUMONIA DUE TO INFECTIOUS ORGANISM, UNSPECIFIED LATERALITY, UNSPECIFIED PART OF LUNG: ICD-10-CM

## 2024-07-20 LAB
ALBUMIN SERPL-MCNC: 3.6 G/DL (ref 3.5–5)
ALP BLD-CCNC: 125 U/L (ref 35–125)
ALT SERPL-CCNC: 27 U/L (ref 5–40)
ANION GAP SERPL CALC-SCNC: 16 MMOL/L
APPARATUS: ABNORMAL
ARTERIAL PATENCY WRIST A: POSITIVE
AST SERPL-CCNC: 35 U/L (ref 5–40)
BASE EXCESS BLDA CALC-SCNC: 0.4 MMOL/L (ref -2–3)
BASOPHILS # BLD AUTO: 0.01 X10*3/UL (ref 0–0.1)
BASOPHILS NFR BLD AUTO: 0.1 %
BILIRUB SERPL-MCNC: 0.9 MG/DL (ref 0.1–1.2)
BODY TEMPERATURE: 37 DEGREES CELSIUS
BUN SERPL-MCNC: 20 MG/DL (ref 8–25)
CALCIUM SERPL-MCNC: 9.4 MG/DL (ref 8.5–10.4)
CHLORIDE SERPL-SCNC: 96 MMOL/L (ref 97–107)
CO2 SERPL-SCNC: 21 MMOL/L (ref 24–31)
CREAT SERPL-MCNC: 1 MG/DL (ref 0.4–1.6)
EGFRCR SERPLBLD CKD-EPI 2021: 58 ML/MIN/1.73M*2
EOSINOPHIL # BLD AUTO: 0 X10*3/UL (ref 0–0.4)
EOSINOPHIL NFR BLD AUTO: 0 %
ERYTHROCYTE [DISTWIDTH] IN BLOOD BY AUTOMATED COUNT: 14.4 % (ref 11.5–14.5)
FLOW: 40 LPM
FLUAV RNA RESP QL NAA+PROBE: NOT DETECTED
FLUBV RNA RESP QL NAA+PROBE: NOT DETECTED
GLUCOSE SERPL-MCNC: 161 MG/DL (ref 65–99)
HCO3 BLDA-SCNC: 22.9 MMOL/L (ref 22–26)
HCT VFR BLD AUTO: 40.2 % (ref 36–46)
HGB BLD-MCNC: 13.8 G/DL (ref 12–16)
IMM GRANULOCYTES # BLD AUTO: 0.11 X10*3/UL (ref 0–0.5)
IMM GRANULOCYTES NFR BLD AUTO: 0.9 % (ref 0–0.9)
INHALED O2 CONCENTRATION: 100 %
LYMPHOCYTES # BLD AUTO: 0.55 X10*3/UL (ref 0.8–3)
LYMPHOCYTES NFR BLD AUTO: 4.3 %
MCH RBC QN AUTO: 28.9 PG (ref 26–34)
MCHC RBC AUTO-ENTMCNC: 34.3 G/DL (ref 32–36)
MCV RBC AUTO: 84 FL (ref 80–100)
MONOCYTES # BLD AUTO: 0.85 X10*3/UL (ref 0.05–0.8)
MONOCYTES NFR BLD AUTO: 6.7 %
NEUTROPHILS # BLD AUTO: 11.18 X10*3/UL (ref 1.6–5.5)
NEUTROPHILS NFR BLD AUTO: 88 %
NRBC BLD-RTO: 0 /100 WBCS (ref 0–0)
NT-PROBNP SERPL-MCNC: 1009 PG/ML (ref 0–624)
OXYHGB MFR BLDA: 93.8 % (ref 94–98)
PCO2 BLDA: 30 MM HG (ref 38–42)
PH BLDA: 7.49 PH (ref 7.38–7.42)
PLATELET # BLD AUTO: 294 X10*3/UL (ref 150–450)
PO2 BLDA: 69 MM HG (ref 85–95)
POTASSIUM SERPL-SCNC: 3 MMOL/L (ref 3.4–5.1)
PROT SERPL-MCNC: 7.2 G/DL (ref 5.9–7.9)
RBC # BLD AUTO: 4.78 X10*6/UL (ref 4–5.2)
RBC MORPH BLD: NORMAL
SAO2 % BLDA: 97 % (ref 94–100)
SARS-COV-2 RNA RESP QL NAA+PROBE: DETECTED
SODIUM SERPL-SCNC: 133 MMOL/L (ref 133–145)
SPECIMEN DRAWN FROM PATIENT: ABNORMAL
TROPONIN T SERPL-MCNC: 34 NG/L
TROPONIN T SERPL-MCNC: 42 NG/L
WBC # BLD AUTO: 12.7 X10*3/UL (ref 4.4–11.3)

## 2024-07-20 PROCEDURE — 82805 BLOOD GASES W/O2 SATURATION: CPT | Performed by: STUDENT IN AN ORGANIZED HEALTH CARE EDUCATION/TRAINING PROGRAM

## 2024-07-20 PROCEDURE — 99291 CRITICAL CARE FIRST HOUR: CPT | Performed by: PHYSICIAN ASSISTANT

## 2024-07-20 PROCEDURE — 84484 ASSAY OF TROPONIN QUANT: CPT | Performed by: PHYSICIAN ASSISTANT

## 2024-07-20 PROCEDURE — 93005 ELECTROCARDIOGRAM TRACING: CPT

## 2024-07-20 PROCEDURE — 71046 X-RAY EXAM CHEST 2 VIEWS: CPT

## 2024-07-20 PROCEDURE — 2500000004 HC RX 250 GENERAL PHARMACY W/ HCPCS (ALT 636 FOR OP/ED): Performed by: PHYSICIAN ASSISTANT

## 2024-07-20 PROCEDURE — 71275 CT ANGIOGRAPHY CHEST: CPT

## 2024-07-20 PROCEDURE — 94660 CPAP INITIATION&MGMT: CPT

## 2024-07-20 PROCEDURE — 93010 ELECTROCARDIOGRAM REPORT: CPT | Performed by: INTERNAL MEDICINE

## 2024-07-20 PROCEDURE — 71046 X-RAY EXAM CHEST 2 VIEWS: CPT | Performed by: RADIOLOGY

## 2024-07-20 PROCEDURE — 2500000001 HC RX 250 WO HCPCS SELF ADMINISTERED DRUGS (ALT 637 FOR MEDICARE OP): Performed by: PHYSICIAN ASSISTANT

## 2024-07-20 PROCEDURE — 2500000002 HC RX 250 W HCPCS SELF ADMINISTERED DRUGS (ALT 637 FOR MEDICARE OP, ALT 636 FOR OP/ED): Performed by: PHYSICIAN ASSISTANT

## 2024-07-20 PROCEDURE — 2550000001 HC RX 255 CONTRASTS: Performed by: PHYSICIAN ASSISTANT

## 2024-07-20 PROCEDURE — 36600 WITHDRAWAL OF ARTERIAL BLOOD: CPT

## 2024-07-20 PROCEDURE — 5A0935A ASSISTANCE WITH RESPIRATORY VENTILATION, LESS THAN 24 CONSECUTIVE HOURS, HIGH NASAL FLOW/VELOCITY: ICD-10-PCS | Performed by: INTERNAL MEDICINE

## 2024-07-20 PROCEDURE — 2020000001 HC ICU ROOM DAILY

## 2024-07-20 PROCEDURE — 87635 SARS-COV-2 COVID-19 AMP PRB: CPT | Performed by: PHYSICIAN ASSISTANT

## 2024-07-20 PROCEDURE — 2500000005 HC RX 250 GENERAL PHARMACY W/O HCPCS: Performed by: INTERNAL MEDICINE

## 2024-07-20 PROCEDURE — 83880 ASSAY OF NATRIURETIC PEPTIDE: CPT | Performed by: PHYSICIAN ASSISTANT

## 2024-07-20 PROCEDURE — 2500000002 HC RX 250 W HCPCS SELF ADMINISTERED DRUGS (ALT 637 FOR MEDICARE OP, ALT 636 FOR OP/ED): Performed by: INTERNAL MEDICINE

## 2024-07-20 PROCEDURE — 80053 COMPREHEN METABOLIC PANEL: CPT | Performed by: PHYSICIAN ASSISTANT

## 2024-07-20 PROCEDURE — 85025 COMPLETE CBC W/AUTO DIFF WBC: CPT | Performed by: PHYSICIAN ASSISTANT

## 2024-07-20 PROCEDURE — 94640 AIRWAY INHALATION TREATMENT: CPT

## 2024-07-20 PROCEDURE — 96375 TX/PRO/DX INJ NEW DRUG ADDON: CPT

## 2024-07-20 PROCEDURE — 36415 COLL VENOUS BLD VENIPUNCTURE: CPT | Performed by: PHYSICIAN ASSISTANT

## 2024-07-20 PROCEDURE — 96365 THER/PROPH/DIAG IV INF INIT: CPT

## 2024-07-20 PROCEDURE — 99291 CRITICAL CARE FIRST HOUR: CPT

## 2024-07-20 RX ORDER — IPRATROPIUM BROMIDE AND ALBUTEROL SULFATE 2.5; .5 MG/3ML; MG/3ML
3 SOLUTION RESPIRATORY (INHALATION) ONCE
Status: COMPLETED | OUTPATIENT
Start: 2024-07-20 | End: 2024-07-20

## 2024-07-20 RX ORDER — POTASSIUM CHLORIDE 20 MEQ/1
20 TABLET, EXTENDED RELEASE ORAL ONCE
Status: COMPLETED | OUTPATIENT
Start: 2024-07-20 | End: 2024-07-20

## 2024-07-20 RX ORDER — POTASSIUM CHLORIDE 1.5 G/1.58G
20 POWDER, FOR SOLUTION ORAL ONCE
Status: COMPLETED | OUTPATIENT
Start: 2024-07-20 | End: 2024-07-20

## 2024-07-20 RX ORDER — IPRATROPIUM BROMIDE AND ALBUTEROL SULFATE 2.5; .5 MG/3ML; MG/3ML
3 SOLUTION RESPIRATORY (INHALATION) EVERY 4 HOURS PRN
Status: DISCONTINUED | OUTPATIENT
Start: 2024-07-20 | End: 2024-07-20

## 2024-07-20 RX ORDER — AMOXICILLIN 250 MG
1 CAPSULE ORAL NIGHTLY
Status: DISCONTINUED | OUTPATIENT
Start: 2024-07-20 | End: 2024-08-02 | Stop reason: HOSPADM

## 2024-07-20 RX ORDER — DEXAMETHASONE 6 MG/1
6 TABLET ORAL DAILY
Status: DISCONTINUED | OUTPATIENT
Start: 2024-07-21 | End: 2024-07-20

## 2024-07-20 RX ORDER — ALBUTEROL SULFATE 0.83 MG/ML
2.5 SOLUTION RESPIRATORY (INHALATION) EVERY 6 HOURS PRN
Status: DISCONTINUED | OUTPATIENT
Start: 2024-07-20 | End: 2024-07-20

## 2024-07-20 RX ORDER — CEFTRIAXONE 1 G/50ML
1 INJECTION, SOLUTION INTRAVENOUS ONCE
Status: COMPLETED | OUTPATIENT
Start: 2024-07-20 | End: 2024-07-20

## 2024-07-20 RX ORDER — DEXAMETHASONE SODIUM PHOSPHATE 10 MG/ML
6 INJECTION INTRAMUSCULAR; INTRAVENOUS EVERY MORNING
Status: COMPLETED | OUTPATIENT
Start: 2024-07-21 | End: 2024-07-30

## 2024-07-20 RX ORDER — POLYETHYLENE GLYCOL 3350 17 G/17G
17 POWDER, FOR SOLUTION ORAL DAILY PRN
Status: DISCONTINUED | OUTPATIENT
Start: 2024-07-20 | End: 2024-08-02 | Stop reason: HOSPADM

## 2024-07-20 RX ORDER — AZITHROMYCIN 500 MG/1
1000 TABLET, FILM COATED ORAL ONCE
Status: COMPLETED | OUTPATIENT
Start: 2024-07-20 | End: 2024-07-20

## 2024-07-20 RX ADMIN — METHYLPREDNISOLONE SODIUM SUCCINATE 125 MG: 125 INJECTION, POWDER, FOR SOLUTION INTRAMUSCULAR; INTRAVENOUS at 17:51

## 2024-07-20 RX ADMIN — CEFTRIAXONE SODIUM 1 G: 1 INJECTION, SOLUTION INTRAVENOUS at 20:29

## 2024-07-20 RX ADMIN — IPRATROPIUM BROMIDE AND ALBUTEROL SULFATE 3 ML: .5; 3 SOLUTION RESPIRATORY (INHALATION) at 17:51

## 2024-07-20 RX ADMIN — POTASSIUM CHLORIDE 20 MEQ: 1.5 POWDER, FOR SOLUTION ORAL at 20:29

## 2024-07-20 RX ADMIN — Medication 40 L/MIN: at 23:24

## 2024-07-20 RX ADMIN — Medication 40 L/MIN: at 20:00

## 2024-07-20 RX ADMIN — IOHEXOL 75 ML: 350 INJECTION, SOLUTION INTRAVENOUS at 18:50

## 2024-07-20 RX ADMIN — POTASSIUM CHLORIDE 20 MEQ: 1500 TABLET, EXTENDED RELEASE ORAL at 20:40

## 2024-07-20 RX ADMIN — AZITHROMYCIN DIHYDRATE 1000 MG: 500 TABLET ORAL at 20:29

## 2024-07-20 ASSESSMENT — PAIN DESCRIPTION - LOCATION: LOCATION: CHEST

## 2024-07-20 ASSESSMENT — PAIN SCALES - GENERAL: PAINLEVEL_OUTOF10: 5 - MODERATE PAIN

## 2024-07-20 ASSESSMENT — COLUMBIA-SUICIDE SEVERITY RATING SCALE - C-SSRS
6. HAVE YOU EVER DONE ANYTHING, STARTED TO DO ANYTHING, OR PREPARED TO DO ANYTHING TO END YOUR LIFE?: NO
1. IN THE PAST MONTH, HAVE YOU WISHED YOU WERE DEAD OR WISHED YOU COULD GO TO SLEEP AND NOT WAKE UP?: NO
2. HAVE YOU ACTUALLY HAD ANY THOUGHTS OF KILLING YOURSELF?: NO

## 2024-07-20 ASSESSMENT — LIFESTYLE VARIABLES
TOTAL SCORE: 0
EVER FELT BAD OR GUILTY ABOUT YOUR DRINKING: NO
EVER HAD A DRINK FIRST THING IN THE MORNING TO STEADY YOUR NERVES TO GET RID OF A HANGOVER: NO
HAVE PEOPLE ANNOYED YOU BY CRITICIZING YOUR DRINKING: NO
HAVE YOU EVER FELT YOU SHOULD CUT DOWN ON YOUR DRINKING: NO

## 2024-07-20 ASSESSMENT — VISUAL ACUITY: OU: 1

## 2024-07-20 ASSESSMENT — PAIN - FUNCTIONAL ASSESSMENT: PAIN_FUNCTIONAL_ASSESSMENT: 0-10

## 2024-07-20 NOTE — ED TRIAGE NOTES
Pt states she was on vacation until Thursday. Pt was on several couple hour flights in the last 10 days.

## 2024-07-21 VITALS
TEMPERATURE: 97.7 F | HEIGHT: 62 IN | DIASTOLIC BLOOD PRESSURE: 55 MMHG | HEART RATE: 57 BPM | RESPIRATION RATE: 19 BRPM | BODY MASS INDEX: 31.32 KG/M2 | OXYGEN SATURATION: 90 % | SYSTOLIC BLOOD PRESSURE: 100 MMHG | WEIGHT: 170.19 LBS

## 2024-07-21 LAB
ALBUMIN SERPL-MCNC: 3.1 G/DL (ref 3.5–5)
ANION GAP SERPL CALC-SCNC: 16 MMOL/L
BUN SERPL-MCNC: 17 MG/DL (ref 8–25)
CALCIUM SERPL-MCNC: 9.2 MG/DL (ref 8.5–10.4)
CHLORIDE SERPL-SCNC: 100 MMOL/L (ref 97–107)
CO2 SERPL-SCNC: 21 MMOL/L (ref 24–31)
CREAT SERPL-MCNC: 0.9 MG/DL (ref 0.4–1.6)
EGFRCR SERPLBLD CKD-EPI 2021: 66 ML/MIN/1.73M*2
ERYTHROCYTE [DISTWIDTH] IN BLOOD BY AUTOMATED COUNT: 14.4 % (ref 11.5–14.5)
GLUCOSE BLD MANUAL STRIP-MCNC: 142 MG/DL (ref 74–99)
GLUCOSE BLD MANUAL STRIP-MCNC: 154 MG/DL (ref 74–99)
GLUCOSE BLD MANUAL STRIP-MCNC: 160 MG/DL (ref 74–99)
GLUCOSE BLD MANUAL STRIP-MCNC: 185 MG/DL (ref 74–99)
GLUCOSE SERPL-MCNC: 180 MG/DL (ref 65–99)
HCT VFR BLD AUTO: 38.3 % (ref 36–46)
HGB BLD-MCNC: 13.1 G/DL (ref 12–16)
LACTATE BLDV-SCNC: 3 MMOL/L (ref 0.4–2)
MAGNESIUM SERPL-MCNC: 1.8 MG/DL (ref 1.6–3.1)
MCH RBC QN AUTO: 28.7 PG (ref 26–34)
MCHC RBC AUTO-ENTMCNC: 34.2 G/DL (ref 32–36)
MCV RBC AUTO: 84 FL (ref 80–100)
NRBC BLD-RTO: 0 /100 WBCS (ref 0–0)
PHOSPHATE SERPL-MCNC: 2.6 MG/DL (ref 2.5–4.5)
PLATELET # BLD AUTO: 295 X10*3/UL (ref 150–450)
POTASSIUM SERPL-SCNC: 3.4 MMOL/L (ref 3.4–5.1)
RBC # BLD AUTO: 4.57 X10*6/UL (ref 4–5.2)
SODIUM SERPL-SCNC: 137 MMOL/L (ref 133–145)
WBC # BLD AUTO: 13.3 X10*3/UL (ref 4.4–11.3)

## 2024-07-21 PROCEDURE — 82947 ASSAY GLUCOSE BLOOD QUANT: CPT

## 2024-07-21 PROCEDURE — 2500000004 HC RX 250 GENERAL PHARMACY W/ HCPCS (ALT 636 FOR OP/ED): Performed by: PHYSICIAN ASSISTANT

## 2024-07-21 PROCEDURE — 80069 RENAL FUNCTION PANEL: CPT | Performed by: PHYSICIAN ASSISTANT

## 2024-07-21 PROCEDURE — 2500000001 HC RX 250 WO HCPCS SELF ADMINISTERED DRUGS (ALT 637 FOR MEDICARE OP): Performed by: PHYSICIAN ASSISTANT

## 2024-07-21 PROCEDURE — 99291 CRITICAL CARE FIRST HOUR: CPT | Performed by: PHYSICIAN ASSISTANT

## 2024-07-21 PROCEDURE — 36415 COLL VENOUS BLD VENIPUNCTURE: CPT | Performed by: PHYSICIAN ASSISTANT

## 2024-07-21 PROCEDURE — 2500000005 HC RX 250 GENERAL PHARMACY W/O HCPCS: Performed by: PHYSICIAN ASSISTANT

## 2024-07-21 PROCEDURE — 83735 ASSAY OF MAGNESIUM: CPT | Performed by: PHYSICIAN ASSISTANT

## 2024-07-21 PROCEDURE — XW033E5 INTRODUCTION OF REMDESIVIR ANTI-INFECTIVE INTO PERIPHERAL VEIN, PERCUTANEOUS APPROACH, NEW TECHNOLOGY GROUP 5: ICD-10-PCS

## 2024-07-21 PROCEDURE — 83605 ASSAY OF LACTIC ACID: CPT | Performed by: PHYSICIAN ASSISTANT

## 2024-07-21 PROCEDURE — 2500000002 HC RX 250 W HCPCS SELF ADMINISTERED DRUGS (ALT 637 FOR MEDICARE OP, ALT 636 FOR OP/ED): Performed by: PHYSICIAN ASSISTANT

## 2024-07-21 PROCEDURE — 2500000001 HC RX 250 WO HCPCS SELF ADMINISTERED DRUGS (ALT 637 FOR MEDICARE OP): Performed by: NURSE PRACTITIONER

## 2024-07-21 PROCEDURE — 94660 CPAP INITIATION&MGMT: CPT

## 2024-07-21 PROCEDURE — 3E0333Z INTRODUCTION OF ANTI-INFLAMMATORY INTO PERIPHERAL VEIN, PERCUTANEOUS APPROACH: ICD-10-PCS

## 2024-07-21 PROCEDURE — 94640 AIRWAY INHALATION TREATMENT: CPT

## 2024-07-21 PROCEDURE — 2020000001 HC ICU ROOM DAILY

## 2024-07-21 PROCEDURE — 85027 COMPLETE CBC AUTOMATED: CPT | Performed by: PHYSICIAN ASSISTANT

## 2024-07-21 RX ORDER — BENZONATATE 100 MG/1
100 CAPSULE ORAL 3 TIMES DAILY PRN
Status: DISCONTINUED | OUTPATIENT
Start: 2024-07-21 | End: 2024-07-22

## 2024-07-21 RX ORDER — NAPROXEN SODIUM 220 MG/1
81 TABLET, FILM COATED ORAL DAILY
Status: CANCELLED | OUTPATIENT
Start: 2024-07-21

## 2024-07-21 RX ORDER — FLUTICASONE FUROATE AND VILANTEROL 200; 25 UG/1; UG/1
2 POWDER RESPIRATORY (INHALATION) 2 TIMES DAILY
Status: DISCONTINUED | OUTPATIENT
Start: 2024-07-21 | End: 2024-07-21

## 2024-07-21 RX ORDER — ACETAMINOPHEN 325 MG/1
650 TABLET ORAL EVERY 6 HOURS PRN
Status: DISCONTINUED | OUTPATIENT
Start: 2024-07-21 | End: 2024-08-02 | Stop reason: HOSPADM

## 2024-07-21 RX ORDER — IPRATROPIUM BROMIDE AND ALBUTEROL SULFATE 2.5; .5 MG/3ML; MG/3ML
3 SOLUTION RESPIRATORY (INHALATION)
Status: DISCONTINUED | OUTPATIENT
Start: 2024-07-21 | End: 2024-07-21

## 2024-07-21 RX ORDER — DEXTROSE 50 % IN WATER (D50W) INTRAVENOUS SYRINGE
12.5
Status: DISCONTINUED | OUTPATIENT
Start: 2024-07-21 | End: 2024-08-02 | Stop reason: HOSPADM

## 2024-07-21 RX ORDER — CEFTRIAXONE 1 G/50ML
1 INJECTION, SOLUTION INTRAVENOUS EVERY 24 HOURS
Status: COMPLETED | OUTPATIENT
Start: 2024-07-21 | End: 2024-07-25

## 2024-07-21 RX ORDER — NAPROXEN SODIUM 220 MG/1
81 TABLET, FILM COATED ORAL DAILY
Status: DISCONTINUED | OUTPATIENT
Start: 2024-07-21 | End: 2024-08-02 | Stop reason: HOSPADM

## 2024-07-21 RX ORDER — POTASSIUM CHLORIDE 20 MEQ/1
40 TABLET, EXTENDED RELEASE ORAL ONCE
Status: COMPLETED | OUTPATIENT
Start: 2024-07-21 | End: 2024-07-21

## 2024-07-21 RX ORDER — FEBUXOSTAT 80 MG/1
80 TABLET, FILM COATED ORAL 3 TIMES WEEKLY
Status: DISCONTINUED | OUTPATIENT
Start: 2024-07-24 | End: 2024-07-22

## 2024-07-21 RX ORDER — FEBUXOSTAT 80 MG/1
80 TABLET, FILM COATED ORAL DAILY
Status: DISCONTINUED | OUTPATIENT
Start: 2024-07-21 | End: 2024-07-21

## 2024-07-21 RX ORDER — POTASSIUM CHLORIDE 14.9 MG/ML
20 INJECTION INTRAVENOUS
Status: DISPENSED | OUTPATIENT
Start: 2024-07-21 | End: 2024-07-21

## 2024-07-21 RX ORDER — LEVOTHYROXINE SODIUM 25 UG/1
25 TABLET ORAL
Status: DISCONTINUED | OUTPATIENT
Start: 2024-07-21 | End: 2024-08-02 | Stop reason: HOSPADM

## 2024-07-21 RX ORDER — TALC
3 POWDER (GRAM) TOPICAL NIGHTLY PRN
Status: DISCONTINUED | OUTPATIENT
Start: 2024-07-21 | End: 2024-08-02 | Stop reason: HOSPADM

## 2024-07-21 RX ORDER — SIMVASTATIN 40 MG/1
40 TABLET, FILM COATED ORAL EVERY EVENING
Status: DISCONTINUED | OUTPATIENT
Start: 2024-07-21 | End: 2024-08-02 | Stop reason: HOSPADM

## 2024-07-21 RX ORDER — AZITHROMYCIN 500 MG/1
500 TABLET, FILM COATED ORAL DAILY
Status: COMPLETED | OUTPATIENT
Start: 2024-07-21 | End: 2024-07-21

## 2024-07-21 RX ORDER — DEXTROSE 50 % IN WATER (D50W) INTRAVENOUS SYRINGE
25
Status: DISCONTINUED | OUTPATIENT
Start: 2024-07-21 | End: 2024-08-02 | Stop reason: HOSPADM

## 2024-07-21 RX ORDER — ENOXAPARIN SODIUM 100 MG/ML
40 INJECTION SUBCUTANEOUS EVERY 24 HOURS
Status: DISCONTINUED | OUTPATIENT
Start: 2024-07-21 | End: 2024-08-02 | Stop reason: HOSPADM

## 2024-07-21 RX ORDER — METOPROLOL TARTRATE 50 MG/1
50 TABLET ORAL 2 TIMES DAILY
Status: DISCONTINUED | OUTPATIENT
Start: 2024-07-21 | End: 2024-07-23

## 2024-07-21 RX ORDER — TRIAMTERENE/HYDROCHLOROTHIAZID 37.5-25 MG
1 TABLET ORAL DAILY
Status: DISCONTINUED | OUTPATIENT
Start: 2024-07-21 | End: 2024-08-02 | Stop reason: HOSPADM

## 2024-07-21 RX ORDER — ALBUTEROL SULFATE 0.83 MG/ML
2.5 SOLUTION RESPIRATORY (INHALATION) EVERY 4 HOURS PRN
Status: DISCONTINUED | OUTPATIENT
Start: 2024-07-21 | End: 2024-08-02 | Stop reason: HOSPADM

## 2024-07-21 RX ORDER — LOPERAMIDE HYDROCHLORIDE 2 MG/1
2 CAPSULE ORAL 4 TIMES DAILY PRN
Status: DISCONTINUED | OUTPATIENT
Start: 2024-07-21 | End: 2024-08-02 | Stop reason: HOSPADM

## 2024-07-21 RX ADMIN — FEBUXOSTAT 80 MG: 80 TABLET ORAL at 08:43

## 2024-07-21 RX ADMIN — DEXAMETHASONE SODIUM PHOSPHATE 6 MG: 10 INJECTION INTRAMUSCULAR; INTRAVENOUS at 08:42

## 2024-07-21 RX ADMIN — BENZONATATE 100 MG: 100 CAPSULE ORAL at 00:55

## 2024-07-21 RX ADMIN — INSULIN HUMAN 1 UNITS: 100 INJECTION, SOLUTION PARENTERAL at 16:23

## 2024-07-21 RX ADMIN — SIMVASTATIN 40 MG: 40 TABLET, FILM COATED ORAL at 20:37

## 2024-07-21 RX ADMIN — INSULIN HUMAN 1 UNITS: 100 INJECTION, SOLUTION PARENTERAL at 08:38

## 2024-07-21 RX ADMIN — SODIUM CHLORIDE, SODIUM LACTATE, POTASSIUM CHLORIDE, AND CALCIUM CHLORIDE 500 ML: 600; 310; 30; 20 INJECTION, SOLUTION INTRAVENOUS at 01:16

## 2024-07-21 RX ADMIN — ENOXAPARIN SODIUM 40 MG: 40 INJECTION SUBCUTANEOUS at 08:43

## 2024-07-21 RX ADMIN — INSULIN HUMAN 1 UNITS: 100 INJECTION, SOLUTION PARENTERAL at 12:14

## 2024-07-21 RX ADMIN — METOPROLOL TARTRATE 50 MG: 50 TABLET, FILM COATED ORAL at 20:38

## 2024-07-21 RX ADMIN — METOPROLOL TARTRATE 50 MG: 50 TABLET, FILM COATED ORAL at 01:16

## 2024-07-21 RX ADMIN — POTASSIUM CHLORIDE 20 MEQ: 14.9 INJECTION, SOLUTION INTRAVENOUS at 06:05

## 2024-07-21 RX ADMIN — CEFTRIAXONE SODIUM 1 G: 1 INJECTION, SOLUTION INTRAVENOUS at 21:00

## 2024-07-21 RX ADMIN — IPRATROPIUM BROMIDE AND ALBUTEROL SULFATE 3 ML: 2.5; .5 SOLUTION RESPIRATORY (INHALATION) at 01:03

## 2024-07-21 RX ADMIN — POTASSIUM CHLORIDE 40 MEQ: 1500 TABLET, EXTENDED RELEASE ORAL at 06:05

## 2024-07-21 RX ADMIN — REMDESIVIR 200 MG: 100 INJECTION, POWDER, LYOPHILIZED, FOR SOLUTION INTRAVENOUS at 01:16

## 2024-07-21 RX ADMIN — METOPROLOL TARTRATE 50 MG: 50 TABLET, FILM COATED ORAL at 08:43

## 2024-07-21 RX ADMIN — LEVOTHYROXINE SODIUM 25 MCG: 0.03 TABLET ORAL at 06:05

## 2024-07-21 RX ADMIN — AZITHROMYCIN DIHYDRATE 500 MG: 500 TABLET ORAL at 08:43

## 2024-07-21 RX ADMIN — Medication 100 PERCENT: at 08:00

## 2024-07-21 RX ADMIN — Medication 40 L/MIN: at 20:00

## 2024-07-21 RX ADMIN — TRIAMTERENE AND HYDROCHLOROTHIAZIDE 1 TABLET: 37.5; 25 TABLET ORAL at 08:43

## 2024-07-21 RX ADMIN — Medication 40 L/MIN: at 20:53

## 2024-07-21 RX ADMIN — ASPIRIN 81 MG 81 MG: 81 TABLET ORAL at 12:14

## 2024-07-21 SDOH — SOCIAL STABILITY: SOCIAL INSECURITY: ARE THERE ANY APPARENT SIGNS OF INJURIES/BEHAVIORS THAT COULD BE RELATED TO ABUSE/NEGLECT?: NO

## 2024-07-21 SDOH — SOCIAL STABILITY: SOCIAL INSECURITY: DO YOU FEEL ANYONE HAS EXPLOITED OR TAKEN ADVANTAGE OF YOU FINANCIALLY OR OF YOUR PERSONAL PROPERTY?: NO

## 2024-07-21 SDOH — SOCIAL STABILITY: SOCIAL INSECURITY: HAVE YOU HAD ANY THOUGHTS OF HARMING ANYONE ELSE?: NO

## 2024-07-21 SDOH — SOCIAL STABILITY: SOCIAL INSECURITY: HAVE YOU HAD THOUGHTS OF HARMING ANYONE ELSE?: NO

## 2024-07-21 SDOH — SOCIAL STABILITY: SOCIAL INSECURITY: HAS ANYONE EVER THREATENED TO HURT YOUR FAMILY OR YOUR PETS?: NO

## 2024-07-21 SDOH — SOCIAL STABILITY: SOCIAL INSECURITY: DOES ANYONE TRY TO KEEP YOU FROM HAVING/CONTACTING OTHER FRIENDS OR DOING THINGS OUTSIDE YOUR HOME?: NO

## 2024-07-21 SDOH — SOCIAL STABILITY: SOCIAL INSECURITY: DO YOU FEEL UNSAFE GOING BACK TO THE PLACE WHERE YOU ARE LIVING?: NO

## 2024-07-21 SDOH — SOCIAL STABILITY: SOCIAL INSECURITY: ABUSE: ADULT

## 2024-07-21 SDOH — SOCIAL STABILITY: SOCIAL INSECURITY: ARE YOU OR HAVE YOU BEEN THREATENED OR ABUSED PHYSICALLY, EMOTIONALLY, OR SEXUALLY BY ANYONE?: NO

## 2024-07-21 ASSESSMENT — COGNITIVE AND FUNCTIONAL STATUS - GENERAL
MOBILITY SCORE: 24
DAILY ACTIVITIY SCORE: 24
DAILY ACTIVITIY SCORE: 24
MOBILITY SCORE: 24
PATIENT BASELINE BEDBOUND: NO

## 2024-07-21 ASSESSMENT — PATIENT HEALTH QUESTIONNAIRE - PHQ9
2. FEELING DOWN, DEPRESSED OR HOPELESS: NOT AT ALL
SUM OF ALL RESPONSES TO PHQ9 QUESTIONS 1 & 2: 0
1. LITTLE INTEREST OR PLEASURE IN DOING THINGS: NOT AT ALL

## 2024-07-21 ASSESSMENT — PAIN - FUNCTIONAL ASSESSMENT
PAIN_FUNCTIONAL_ASSESSMENT: 0-10

## 2024-07-21 ASSESSMENT — LIFESTYLE VARIABLES
HOW MANY STANDARD DRINKS CONTAINING ALCOHOL DO YOU HAVE ON A TYPICAL DAY: PATIENT DOES NOT DRINK
AUDIT-C TOTAL SCORE: 0
HOW OFTEN DO YOU HAVE 6 OR MORE DRINKS ON ONE OCCASION: NEVER
HOW OFTEN DO YOU HAVE A DRINK CONTAINING ALCOHOL: NEVER
SKIP TO QUESTIONS 9-10: 1
AUDIT-C TOTAL SCORE: 0

## 2024-07-21 ASSESSMENT — PAIN SCALES - WONG BAKER: WONGBAKER_NUMERICALRESPONSE: NO HURT

## 2024-07-21 ASSESSMENT — PAIN SCALES - GENERAL
PAINLEVEL_OUTOF10: 0 - NO PAIN

## 2024-07-21 ASSESSMENT — ACTIVITIES OF DAILY LIVING (ADL)
PATIENT'S MEMORY ADEQUATE TO SAFELY COMPLETE DAILY ACTIVITIES?: YES
WALKS IN HOME: INDEPENDENT
JUDGMENT_ADEQUATE_SAFELY_COMPLETE_DAILY_ACTIVITIES: YES
ADEQUATE_TO_COMPLETE_ADL: YES
HEARING - RIGHT EAR: FUNCTIONAL
DRESSING YOURSELF: INDEPENDENT
TOILETING: INDEPENDENT
GROOMING: INDEPENDENT
HEARING - LEFT EAR: FUNCTIONAL
BATHING: INDEPENDENT
LACK_OF_TRANSPORTATION: NO
FEEDING YOURSELF: INDEPENDENT

## 2024-07-21 NOTE — CARE PLAN
Evaluating patient for hypoxia on 6L nasal cannula; SpO2 86%; Changed to 14L high flow bubbler, SpO2 88-89%; changed finger pulse oximeter probe from right to left hand without improvement; placed patient on Vapotherm heated high flow nasal cannula 40L fio2 100%; SpO2 increased to 94-95%; holding at 95%; Nursing and Physician notified.

## 2024-07-21 NOTE — SIGNIFICANT EVENT
CODE STATUS CHANGE    Around 0030 hrs this day, patient let nurse know that she wanted to be DNR status. JENNY to bedside to have discussion with patient. Patient does have capacity.    Lengthy conversation regarding the types of DNR, the skills and interventions inherent to arrest scenarios, and typical outcomes. During this time, the patient was allowed time for questions, she received complete answers to these questions, and demonstrated an understanding to the outcomes of these decisions in the unlikely event she should suffer cardiac and/or pulmonary arrest during this admission. She did add that she has a signed DNR at home, but there is nothing in the chart reflecting this. Patient is to ask her  to bring in into the hospital.    At this time, she has made an informed decision to be made DNR-CCA with the caveat of allowing intubation and mechanical ventilation prior to actual cardiac arrest. But no heroic interventions (i.e. CPR) or medications should he sustain a V-fib, pulseless V-tach, or PEA type arrest.    As stated, the patient will update her DNR status paperwork with the hospital during this admission. But as of this writing, this document represents her wishes.    Discussed with Dr. Winnie Crowder, MPAS, M,Ed., PA-C  Pulmonary/Critical Care, [facility]  Secure Chat Preferred

## 2024-07-21 NOTE — H&P
[facility] Critical Care Medicine       Date:  7/20/2024  Patient:  Irma Baumann  YOB: 1945  MRN:  [id number]   Admit Date:  7/20/2024  ========================================================================================================    Chief Complaint   Patient presents with    Shortness of Breath     10 days ago started having a cough. Hx. Asthma. Now SOB and hypoxic at . Inhaler not helping much.    Cough         History of Present Illness:  Irma Baumann is a 79 y.o. year old female patient  who presented to ED with 10 day history of increased work of breathing, ESPINAL, and cough. Patient states that she just returned on 7/18 from a 2 week vacation in [place]. During this time, one week was spent in [place], and one week was spent on a cruise ship sailing around [place]. Upon returning home, she had complaints as identified above, however they did not resolve and continued to worsen so she sought medical care in ED. During workup in ED, she was found to to be Covid+ with CXR and CTA-C findings consistent with bilateral lower lobe PNA. She was initially responsive to NC oxygen, however her oxygen demands rapidly escalated to needing HFNC at 40L and FiO2 of 1.0 in order to maintain SPO2 >92%. While she was initially admitted to to the floor under medicine, with increasing oxygen requirements she was transitioned to ICU. She was incidentally found to be hypokalemic which was repleted by ED.    Patient has a history of  HTN, Asthma, Gout, Hypothyroidism     Interval ICU Events:  7/20: Patient evaluated by ICU team in ED. Still on HFNC, persistent cough, diminished bibasilar breath sounds. Antibiotics and Rimdisivir initiated, ongoing bronchodilation therapy, admitted to ICU.     Medical History:  Per patient: HTN, Asthma, Gout, Hypothyroidism  Past Medical History:   Diagnosis Date    Personal history of other diseases of the circulatory system     History of hypertension    Personal history  of other diseases of the respiratory system     History of asthma     Past Surgical History:   Procedure Laterality Date    OTHER SURGICAL HISTORY  11/30/2022    Hysterectomy    OTHER SURGICAL HISTORY  11/30/2022    Sinus surgery     Allergies  Levofloxacin, Allopurinol, Cephalexin, Ciprofloxacin, Sulfamethoxazole-trimethoprim, Colchicine, and Penicillins  Social History     Tobacco Use    Smoking status: Never     Passive exposure: Never    Smokeless tobacco: Never   Vaping Use    Vaping status: Never Used   Substance Use Topics    Alcohol use: Yes     Comment: sometimes    Drug use: Never     Family History   Problem Relation Name Age of Onset    Hypertension Mother      Other (cva) Mother      Diabetes Mother      Stroke Mother      Hypertension Father      Other (cva) Father      Stroke Father      Diabetes Sister      Heart disease Brother      No Known Problems Son      No Known Problems Son         Hospital Medications:    Current Facility-Administered Medications:     oxygen (O2) therapy, , inhalation, Continuous - Inhalation, Michael Seanz MD    Current Outpatient Medications:     albuterol 108 (90 Base) MCG/ACT inhaler, Inhale 2 puffs every 6 hours if needed., Disp: , Rfl:     albuterol 2.5 mg /3 mL (0.083 %) nebulizer solution, every 6 hours.  3 ml as needed Inhalation, Disp: , Rfl:     budesonide-formoteroL (Symbicort) 160-4.5 mcg/actuation inhaler, Inhale 2 puffs 2 times a day., Disp: , Rfl:     cholecalciferol, vitamin D3, (VITAMIN D3 ORAL), Take 1 capsule by mouth once daily., Disp: , Rfl:     colchicine, gout, (Colcrys) 0.6 mg tablet, Take 1 tablet (0.6 mg) by mouth every other day. Gout, Disp: , Rfl:     econazole nitrate 1 % cream, APPLY TO AFFECTED AREAS UNDER BREASTS TWICE PER DAY AS NEEDED FOR FLARES, Disp: , Rfl:     febuxostat (Uloric) 80 mg tablet, Take 1 tablet (80 mg) by mouth once daily., Disp: 90 tablet, Rfl: 3    ibuprofen (AdviL) 200 mg tablet, Take 3 tablets (600 mg) by mouth every 6  "hours if needed (pain)., Disp: , Rfl:     ipratropium (Atrovent) 42 mcg (0.06 %) nasal spray, 1 spray 3 times a day., Disp: , Rfl:     levothyroxine (Synthroid, Levoxyl) 25 mcg tablet, TAKE 1 TABLET BY MOUTH IN THE  MORNING ON AN EMPTY STOMACH, Disp: 90 tablet, Rfl: 3    metoprolol tartrate (Lopressor) 50 mg tablet, Take 1 tablet by mouth 2 times a day., Disp: , Rfl:     potassium chloride CR (Klor-Con M20) 20 mEq ER tablet, Take 1 tablet (20 mEq) by mouth once daily. With food, Disp: 90 tablet, Rfl: 3    simvastatin (Zocor) 40 mg tablet, TAKE 1 TABLET BY MOUTH ONCE  DAILY IN THE EVENING, Disp: 90 tablet, Rfl: 3    triamcinolone (Kenalog) 0.5 % cream, Apply topically 2 times a day., Disp: 15 g, Rfl: 0    triamterene-hydrochlorothiazid (Maxzide) 75-50 mg tablet, Take 0.5 tablets by mouth once daily., Disp: 45 tablet, Rfl: 3    Review of Systems:  14 point review of systems was completed and negative except for those specially mention in my HPI    Physical Exam:    Heart Rate:  []   Temperature:  [37.2 °C (99 °F)]   Respirations:  [18-31]   BP: (122-157)/(64-85)   Height:  [157.5 cm (5' 2\")]   Weight:  [72.6 kg (160 lb)]   Pulse Ox:  [89 %-95 %]     Physical Exam  Vitals reviewed.   Constitutional:       General: She is awake.      Appearance: Normal appearance. She is well-developed. She is not ill-appearing, toxic-appearing or diaphoretic.   HENT:      Head: Normocephalic.      Nose: Nose normal.      Mouth/Throat:      Lips: Pink.      Mouth: Mucous membranes are moist.      Pharynx: Oropharynx is clear.      Comments: Age related dental decay  Eyes:      General: Lids are normal. Vision grossly intact. No allergic shiner.     Extraocular Movements: Extraocular movements intact.      Conjunctiva/sclera: Conjunctivae normal.      Comments: MARINA, R4/L4 with consensual response   Neck:      Vascular: No JVD.      Trachea: Phonation normal.   Cardiovascular:      Rate and Rhythm: Normal rate. Rhythm irregular. " Occasional Extrasystoles are present.     Pulses: Normal pulses.           Carotid pulses are 2+ on the right side and 2+ on the left side.       Radial pulses are 2+ on the right side and 2+ on the left side.        Posterior tibial pulses are 2+ on the right side and 2+ on the left side.      Heart sounds: Normal heart sounds.      Comments: Peripheral perfusion intact  Pulmonary:      Effort: Tachypnea and respiratory distress present.      Breath sounds: Decreased air movement present. Examination of the right-middle field reveals wheezing. Examination of the left-middle field reveals wheezing. Examination of the right-lower field reveals decreased breath sounds. Examination of the left-lower field reveals decreased breath sounds. Decreased breath sounds and wheezing present.      Comments: Able to speak in full sentences.   Abdominal:      General: Bowel sounds are normal. There is no distension.      Palpations: Abdomen is soft.      Comments: Still experiencing normal bowel movements   Genitourinary:     Comments: No changes to bladder habits  Musculoskeletal:      Cervical back: Full passive range of motion without pain.      Comments: No changes to active/passive ROM, no swelling to joints or pain with movement.   Skin:     General: Skin is warm.      Capillary Refill: Capillary refill takes less than 2 seconds.      Comments: Mild +1 edema to lower extremities extending caudally to mid shin (patient states this is normal), scattered age related skin changes.   Neurological:      General: No focal deficit present.      Mental Status: She is alert.      GCS: GCS eye subscore is 4. GCS verbal subscore is 5. GCS motor subscore is 6.      Sensory: Sensation is intact.      Motor: Motor function is intact.      Coordination: Coordination is intact.   Psychiatric:         Attention and Perception: Attention and perception normal.         Mood and Affect: Mood and affect normal.         Behavior: Behavior normal.  "Behavior is cooperative.         Cognition and Memory: Cognition normal.       Objective:  I have reviewed all medications, laboratory results, and imaging pertinent for today's encounter. Please refer to radiology studies for all historical imaging and interpretations, please refer to EMR \"Results\" tab for all current and historical lab work.       Intake/Output Summary (Last 24 hours) at 2024 2240  Last data filed at 2024  Gross per 24 hour   Intake 50 ml   Output --   Net 50 ml       Assessment/Plan:    This critically ill patient is currently in the ICU being managed for the following:  -Acute respiratory failure with hypoxia  -Bilateral pneumonia  -Covid+    Neuro/Psych/Pain Ctrl/Sedation:   -DNR-CCA status, ETT if needed for pulmonary concern  -GCS =15  -Acetaminophen for pain/fever control  -CAM ICU score q-shift  -Sleep/wake cycle hygiene  -Delirium precaution  -Q4 neuro assessment    Pulmonary/ENT: (Hx: Asthma)  ##Acute respiratory failure with hypoxia  ##Bilateral PNA  -ED AB.49, PaCO2 30 torr, PaO2 69 torr, HC03- 22.9  -Maintain SpO2 at/above 92%  -HFNC @ 40L, FiO2 1.0  -High risk respiratory protocol (C&DB, IS use)  -Remdesivir ordered, dexamethasone ordered  -Tessalon pearls for cough  -Home Breo-Ellipta continued  -Home atrovent not continued, duonebs available  -Antibiotics ordered  -Continue to monitor    Cardiovascular: (Hx: HTN, HLD)  -ECG NSR, continue ongoing telemetry  -Initial trop 42, delta 34, likely demand related  -Maintain MAP >65 mmHg  -Monitor baseline vitals  -Continue home febuxostat, metoprolol    Gastrointestinal:  -Regular diet  -Nutritional supplements ordered  -Bowel regimen    -->Silvia-colace QD, Miralax PRN     Genitourinary/FEN:  ##Hypokalemia   -K+ repleted by ED  -Maintain UO >0.5ml/kg/hr  -500ml RL bolus  -Daily labs while in ICU  -Replete electrolytes as needed    -->Mg>2, Phos>3, K+>4  -Avoid nephrotoxins  -Continue home Maxide    Heme/Onc:  -Transfuse " for Hgb <7.0 mg/dL  -Daily CBC while in ICU    Endocrine (Hx: Hypothyroidism)  -BGL: 161 on admission  -Maintain BGL <180mg/dL  -Moderate regular SSI  -Dexamethasone 6mg QD x10 days  -Continue home synthroid    Infectious Disease:   ##Covid+  ##PNA  -Tmax = 99.0 on admission  -Mild leukocytosis at 12.7  -ATB: Rocephin/Azithromycin  -Monitor for SIRS/Sepsis  -Initial lactate pending    MSK/SKIN: (Hx: Gout)  -Padding of pressure points  -Daily wound inspection  -Local nursing skin care    Ethics/Code Status:  -Full code, patient can demonstrate capacity    PPX:  -DVT Prophylaxis: Lovenox 40mg QD, SCD  -GI Prophylaxis: None  -Bowel Regimen: Silvia-colace, Miralax    Tubes/Lines/Drains:  -PIV x1    :  Restraints: None    DISPO: Monitor pulmonary status, follow up ABGs and morning labs, aggressive pulmonary hygiene, clinical course to determine. Continued ICU level care    Seen and Discussed with Dr. Zhou    Critical Care Time:  Total face to face time spent with patient/family of 55 minutes critical care time, with >50% of the time spent discussing plan of care/management, counseling/educating on disease processes, explaining results of diagnostic testing.    EDUARDO Phelps, M,Ed., PA-C   - [facility]  Pulmonary/Critical Care Medicine  Secure Chat Preferred

## 2024-07-21 NOTE — ED PROVIDER NOTES
HPI   Chief Complaint   Patient presents with   • Shortness of Breath     10 days ago started having a cough. Hx. Asthma. Now SOB and hypoxic at . Inhaler not helping much.   • Cough       79-year-old female presented emergency department the chief complaint of shortness of breath.  She recently returned from a cruise ship within the week.  She has been dealing with cough and shortness of breath for about the last week.  No fevers.  Feels short of breath with minimal exertion.  No chest pain.  Denies lightheadedness dizziness numbness weakness.  Alert.  Nontoxic.  Hypoxic on arrival.  Does not typically wear supplemental oxygen.  No asthma history.  No other complaint.              Patient History   Past Medical History:   Diagnosis Date   • Personal history of other diseases of the circulatory system     History of hypertension   • Personal history of other diseases of the respiratory system     History of asthma     Past Surgical History:   Procedure Laterality Date   • OTHER SURGICAL HISTORY  11/30/2022    Hysterectomy   • OTHER SURGICAL HISTORY  11/30/2022    Sinus surgery     Family History   Problem Relation Name Age of Onset   • Hypertension Mother     • Other (cva) Mother     • Diabetes Mother     • Stroke Mother     • Hypertension Father     • Other (cva) Father     • Stroke Father     • Diabetes Sister     • Heart disease Brother     • No Known Problems Son     • No Known Problems Son       Social History     Tobacco Use   • Smoking status: Never     Passive exposure: Never   • Smokeless tobacco: Never   Vaping Use   • Vaping status: Never Used   Substance Use Topics   • Alcohol use: Yes     Comment: sometimes   • Drug use: Never       Physical Exam   ED Triage Vitals   Temperature Heart Rate Respirations BP   07/20/24 1725 07/20/24 1725 07/20/24 1725 07/20/24 1800   37.2 °C (99 °F) 79 (!) 24 126/69      Pulse Ox Temp Source Heart Rate Source Patient Position   07/20/24 1725 07/20/24 1725 07/20/24 2000  07/20/24 1725   (!) 89 % Tympanic Monitor Sitting      BP Location FiO2 (%)     07/20/24 1725 --     Left arm        Physical Exam  Vitals and nursing note reviewed.   Constitutional:       Appearance: She is well-developed.   HENT:      Head: Normocephalic.   Cardiovascular:      Rate and Rhythm: Normal rate and regular rhythm.   Pulmonary:      Comments: Diffuse wheezing in all lung fields, rhonchorous at bases  Abdominal:      Palpations: Abdomen is soft.   Musculoskeletal:         General: Normal range of motion.      Cervical back: Normal range of motion.   Skin:     General: Skin is warm.   Neurological:      General: No focal deficit present.      Mental Status: She is alert and oriented to person, place, and time.   Psychiatric:         Mood and Affect: Mood normal.           ED Course & MDM   Diagnoses as of 07/20/24 2112   COVID-19   Pneumonia due to infectious organism, unspecified laterality, unspecified part of lung   Acute respiratory failure with hypoxia (Multi)                       Daniella Coma Scale Score: 15                        Medical Decision Making  I have seen and evaluated this patient.  The attending physician has also seen and evaluated this patient.  Vital signs, laboratory testing and diagnostic images if applicable have been reviewed.  All laboratory and imaging is interpreted by myself unless otherwise stated.  Radiology studies are also formally interpreted by radiologist.    Without significant leukocytosis or anemia, metabolic panel demonstrates hypokalemia likely related to patient albuterol treatment.  No significant renal impairment.  She is hypoxic with new oxygen requirement.  Chest x-ray with bilateral pneumonia.  Sent for CT study given COVID positive with hypoxia with recent travel.  Negative for pulmonary embolism.  Redemonstrates extensive groundglass changes.  Patient as of now requiring high flow nasal cannula.  ABG sent.  Initially due to wheezing received Solu-Medrol  and DuoNeb.  Will defer remdesivir to admitting team.  Will be admitted to intensive care unit for further treatment of COVID pneumonia respiratory failure.    I have seen and evaluated this patient and independently provided 31 minutes of nonconcurrent critical care time. This does not include separately billable procedures. Patient with high potential for deterioration, required frequent monitoring and assessment.    Labs Reviewed  COMPREHENSIVE METABOLIC PANEL - Abnormal     Glucose                       161 (*)                Sodium                        133                    Potassium                     3.0 (*)                Chloride                      96 (*)                 Bicarbonate                   21 (*)                 Urea Nitrogen                 20                     Creatinine                    1.00                   eGFR                          58 (*)                 Calcium                       9.4                    Albumin                       3.6                    Alkaline Phosphatase          125                    Total Protein                 7.2                    AST                           35                     Bilirubin, Total              0.9                    ALT                           27                     Anion Gap                     16                  CBC WITH AUTO DIFFERENTIAL - Abnormal     WBC                           12.7 (*)               nRBC                          0.0                    RBC                           4.78                   Hemoglobin                    13.8                   Hematocrit                    40.2                   MCV                           84                     MCH                           28.9                   MCHC                          34.3                   RDW                           14.4                   Platelets                     294                    Neutrophils %                 88.0                    Immature Granulocytes %, Automated   0.9                    Lymphocytes %                 4.3                    Monocytes %                   6.7                    Eosinophils %                 0.0                    Basophils %                   0.1                    Neutrophils Absolute          11.18 (*)               Immature Granulocytes Absolute, Au*   0.11                   Lymphocytes Absolute          0.55 (*)               Monocytes Absolute            0.85 (*)               Eosinophils Absolute          0.00                   Basophils Absolute            0.01                SARS-COV-2 PCR - Abnormal     Coronavirus 2019, PCR         Detected (*)                 Narrative: This assay has received FDA Emergency Use Authorization (EUA) and is only authorized for the duration of time that circumstances exist to justify the authorization of the emergency use of in vitro diagnostic tests for the detection of SARS-CoV-2 virus and/or diagnosis of COVID-19 infection under section 564(b)(1) of the Act, 21 U.S.C. 360bbb-3(b)(1). This assay is an in vitro diagnostic nucleic acid amplification test for the qualitative detection of SARS-CoV-2 from nasopharyngeal specimens and has been validated for use at . Negative results do not preclude COVID-19 infections and should not be used as the sole basis for diagnosis, treatment, or other management decisions.                  N-TERMINAL PROBNP - Abnormal     PROBNP                        1,009 (*)                 Narrative: Reference ranges are based on clinical submission data. These ranges represent the 95th percentile of normal cut-off points. As NT Pro- BNP values approach 1000 pg/ml, clinical symptoms are more likely associated with CHF.  SERIAL TROPONIN, INITIAL ([facility]) - Abnormal     Troponin T, High Sensitivity   42 (*)              INFLUENZA A AND B PCR - Normal     Flu A Result                                         Flu  B Result                                           Narrative: This assay is an in vitro diagnostic multiplex nucleic acid amplification test for the detection and discrimination of Influenza A & B from nasopharyngeal specimens, and has been validated for use at [facility]. Negative results do not preclude Influenza A/B infections, and should not be used as the sole basis for diagnosis, treatment, or other management decisions. If Influenza A/B and RSV PCR results are negative, testing for Parainfluenza virus, Adenovirus and Metapneumovirus is routinely performed for [facility] pediatric oncology and intensive care inpatients, and is available on other patients by placing an add-on request.  TROPONIN T SERIES, HIGH SENSITIVITY (0, 2 HR, 6 HR)       Narrative: The following orders were created for panel order Troponin T Series, High Sensitivity (0, 2HR, 6HR).                Procedure                               Abnormality         Status                                   ---------                               -----------         ------                                   Serial Troponin, Initial...[[id number]]  Abnormal            Final result                             Serial Troponin, 2 Hour ...[[id number]]                      In process                               Serial Troponin, 6 Hour ...[[id number]]                                                                               Please view results for these tests on the individual orders.  SERIAL TROPONIN,  2 HOUR ([facility])  SERIAL TROPONIN, 6 HOUR ([facility])  COMPREHENSIVE METABOLIC PANEL  CBC WITH AUTO DIFFERENTIAL  C-REACTIVE PROTEIN  BLOOD GAS ARTERIAL  MORPHOLOGY  CT angio chest for pulmonary embolism   Final Result    1. No evidence of acute pulmonary embolism.    2. Mild dilation of the main pulmonary artery measuring 3.3 cm in    diameter which can be associated with pulmonary hypertension.    3. Findings of extensive pneumonia in the  bilateral lower lobes.          MACRO:    None.          Signed by: Tyra Jo 7/20/2024 7:15 PM    Dictation workstation:   [id number]     XR chest 2 views   Final Result    Dense airspace disease at the bases with perihilar vascular    congestion. A component of edema is present. Superimposed multifocal    pneumonia is suspected.                MACRO:    None          Signed by: Elijah Gracia 7/20/2024 7:07 PM    Dictation workstation:   [id number]     Medications  oxygen (O2) therapy (has no administration in time range)  dexAMETHasone (Decadron) tablet 6 mg (has no administration in time range)  albuterol 2.5 mg /3 mL (0.083 %) nebulizer solution 2.5 mg (has no administration in time range)  ipratropium-albuteroL (Duo-Neb) 0.5-2.5 mg/3 mL nebulizer solution 3 mL (3 mL nebulization Given 7/20/24 1751)  methylPREDNISolone sod succinate (SOLU-Medrol) injection 125 mg (125 mg intravenous Given 7/20/24 1751)  ipratropium-albuteroL (Duo-Neb) 0.5-2.5 mg/3 mL nebulizer solution 3 mL (3 mL nebulization Given 7/20/24 1751)  potassium chloride (Klor-Con) packet 20 mEq (20 mEq oral Given 7/20/24 2029)   iohexol (OMNIPaque) 350 mg iodine/mL solution 75 mL (75 mL intravenous Given 7/20/24 1850)  cefTRIAXone (Rocephin) 1 g in dextrose (iso) IV 50 mL (0 g intravenous Stopped 7/20/24 2059)  azithromycin (Zithromax) tablet 1,000 mg (1,000 mg oral Given 7/20/24 2029)  potassium chloride CR (Klor-Con M20) ER tablet 20 mEq (20 mEq oral Given 7/20/24 2040)  New Prescriptions  No medications on file            Procedure  Procedures     Adrian Castro PA-C  07/20/24 2113

## 2024-07-21 NOTE — PROGRESS NOTES
[facility] Critical Care Medicine       Date:  7/21/2024  Patient:  Irma Baumann  YOB: 1945  MRN:  [id number]   Admit Date:  7/20/2024  ========================================================================================================    Chief Complaint   Patient presents with    Shortness of Breath     10 days ago started having a cough. Hx. Asthma. Now SOB and hypoxic at . Inhaler not helping much.    Cough         History of Present Illness:  Irma Bamuann is a 79 y.o. year old female patient  who presented to ED with 10 day history of increased work of breathing, ESPINAL, and cough. Patient states that she just returned on 7/18 from a 2 week vacation in [place]. During this time, one week was spent in [place], and one week was spent on a cruise ship sailing around [place]. Upon returning home, she had complaints as identified above, however they did not resolve and continued to worsen so she sought medical care in ED. During workup in ED, she was found to to be Covid+ with CXR and CTA-C findings consistent with bilateral lower lobe PNA. She was initially responsive to NC oxygen, however her oxygen demands rapidly escalated to needing HFNC at 40L and FiO2 of 1.0 in order to maintain SPO2 >92%. While she was initially admitted to to the floor under medicine, with increasing oxygen requirements she was transitioned to ICU. She was incidentally found to be hypokalemic which was repleted by ED.     Patient has a history of  HTN, Asthma, Gout, Hypothyroidism      Interval ICU Events:  7/20: Patient evaluated by ICU team in ED. Still on HFNC, persistent cough, diminished bibasilar breath sounds. Antibiotics and Rimdisivir initiated, ongoing bronchodilation therapy, admitted to ICU.     7/21: Ongoing pulmonary hygiene, intermittent self-proning, continue ATB, decrease FiO2 as possible.     Medical History:  Per patient: HTN, Asthma, Gout, Hypothyroidism   Past Medical History:   Diagnosis Date     Personal history of other diseases of the circulatory system     History of hypertension    Personal history of other diseases of the respiratory system     History of asthma     Past Surgical History:   Procedure Laterality Date    OTHER SURGICAL HISTORY  11/30/2022    Hysterectomy    OTHER SURGICAL HISTORY  11/30/2022    Sinus surgery     Medications Prior to Admission   Medication Sig Dispense Refill Last Dose    albuterol 108 (90 Base) MCG/ACT inhaler Inhale 2 puffs every 6 hours if needed.       albuterol 2.5 mg /3 mL (0.083 %) nebulizer solution every 6 hours.  3 ml as needed Inhalation       budesonide-formoteroL (Symbicort) 160-4.5 mcg/actuation inhaler Inhale 2 puffs 2 times a day.       cholecalciferol, vitamin D3, (VITAMIN D3 ORAL) Take 1 capsule by mouth once daily.       colchicine, gout, (Colcrys) 0.6 mg tablet Take 1 tablet (0.6 mg) by mouth every other day. Gout       econazole nitrate 1 % cream APPLY TO AFFECTED AREAS UNDER BREASTS TWICE PER DAY AS NEEDED FOR FLARES       febuxostat (Uloric) 80 mg tablet Take 1 tablet (80 mg) by mouth once daily. 90 tablet 3     ibuprofen (AdviL) 200 mg tablet Take 3 tablets (600 mg) by mouth every 6 hours if needed (pain).       ipratropium (Atrovent) 42 mcg (0.06 %) nasal spray 1 spray 3 times a day.       levothyroxine (Synthroid, Levoxyl) 25 mcg tablet TAKE 1 TABLET BY MOUTH IN THE  MORNING ON AN EMPTY STOMACH 90 tablet 3     metoprolol tartrate (Lopressor) 50 mg tablet Take 1 tablet by mouth 2 times a day.       potassium chloride CR (Klor-Con M20) 20 mEq ER tablet Take 1 tablet (20 mEq) by mouth once daily. With food 90 tablet 3     simvastatin (Zocor) 40 mg tablet TAKE 1 TABLET BY MOUTH ONCE  DAILY IN THE EVENING 90 tablet 3     triamcinolone (Kenalog) 0.5 % cream Apply topically 2 times a day. 15 g 0     triamterene-hydrochlorothiazid (Maxzide) 75-50 mg tablet Take 0.5 tablets by mouth once daily. 45 tablet 3      Levofloxacin, Allopurinol, Cephalexin,  Ciprofloxacin, Sulfamethoxazole-trimethoprim, Colchicine, and Penicillins  Social History     Tobacco Use    Smoking status: Never     Passive exposure: Never    Smokeless tobacco: Never   Vaping Use    Vaping status: Never Used   Substance Use Topics    Alcohol use: Yes     Comment: sometimes    Drug use: Never     Family History   Problem Relation Name Age of Onset    Hypertension Mother      Other (cva) Mother      Diabetes Mother      Stroke Mother      Hypertension Father      Other (cva) Father      Stroke Father      Diabetes Sister      Heart disease Brother      No Known Problems Son      No Known Problems Son         Hospital Medications:    Current Facility-Administered Medications:     acetaminophen (Tylenol) tablet 650 mg, 650 mg, oral, q6h PRN, Lawrence Crowder PA-C    albuterol 2.5 mg /3 mL (0.083 %) nebulizer solution 2.5 mg, 2.5 mg, nebulization, q4h PRN, Lawrence Crowder PA-C    azithromycin (Zithromax) tablet 500 mg, 500 mg, oral, Daily, Lawrence Crowder PA-C    benzonatate (Tessalon) capsule 100 mg, 100 mg, oral, TID PRN, Lawrence Crowder PA-C, 100 mg at 07/21/24 0055    cefTRIAXone (Rocephin) 1 g in dextrose (iso) IV 50 mL, 1 g, intravenous, q24h, Lawrence Crowder PA-C    dexAMETHasone (Decadron) injection 6 mg, 6 mg, intravenous, q AM, Lawrence Crowder PA-C    dextrose 50 % injection 12.5 g, 12.5 g, intravenous, q15 min PRN, Lawrence Crowder PA-C    dextrose 50 % injection 25 g, 25 g, intravenous, q15 min PRN, Lawrence Crowder PA-C    enoxaparin (Lovenox) syringe 40 mg, 40 mg, subcutaneous, q24h, Lawrence Crowder PA-C    febuxostat (Uloric) tablet 80 mg, 80 mg, oral, Daily, Lawrence Crowder PA-C    glucagon (Glucagen) injection 1 mg, 1 mg, intramuscular, q15 min PRN, Lawrence Crowder PA-C    glucagon (Glucagen) injection 1 mg, 1 mg, intramuscular, q15 min PRN, Lawrence Crowder PA-C    insulin regular (HumuLIN R,NovoLIN R) injection 0-5 Units, 0-5 Units, subcutaneous, q4h, Lawrence RINCON  "MAXWELL Crowder, 1 Units at 07/21/24 0838    levothyroxine (Synthroid, Levoxyl) tablet 25 mcg, 25 mcg, oral, Daily before breakfast, Lawrence Crowder PA-C, 25 mcg at 07/21/24 0605    melatonin tablet 3 mg, 3 mg, oral, Nightly PRN, Lawrence Crowder PA-C    metoprolol tartrate (Lopressor) tablet 50 mg, 50 mg, oral, BID, Lawrence Crowder PA-C, 50 mg at 07/21/24 0116    oxygen (O2) therapy, , inhalation, Continuous - Inhalation, Lawrence Crowder PA-C, 40 L/min at 07/20/24 2324    oxygen (O2) therapy, , inhalation, Continuous PRN - O2/gases, Lawrence Crowder PA-C    polyethylene glycol (Glycolax, Miralax) packet 17 g, 17 g, oral, Daily PRN, Lawrence Crowder PA-C    [COMPLETED] remdesivir (Veklury) 200 mg in sodium chloride 0.9% 250 mL IV, 200 mg, intravenous, Once, Stopped at 07/21/24 0146 **FOLLOWED BY** [START ON 7/22/2024] remdesivir (Veklury) 100 mg in sodium chloride 0.9% 250 mL IV, 100 mg, intravenous, q24h, Lawrence Crowder PA-C    sennosides-docusate sodium (Silvia-Colace) 8.6-50 mg per tablet 1 tablet, 1 tablet, oral, Nightly, Lawrence Crowder PA-C    triamterene-hydrochlorothiazid (Maxzide-25) 37.5-25 mg per tablet 1 tablet, 1 tablet, oral, Daily, Lawrence Crowder PA-C    Review of Systems:  14 point review of systems was completed and negative except for those specially mention in my HPI    Physical Exam:    Heart Rate:  []   Temp:  [36.9 °C (98.4 °F)-37.2 °C (99 °F)]   Resp:  [16-31]   BP: (122-157)/(64-85)   Height:  [157.5 cm (5' 2\")]   Weight:  [72.6 kg (160 lb)-77.2 kg (170 lb 3.1 oz)]   SpO2:  [89 %-95 %]     Physical Exam  Physical Exam  Vitals reviewed.   Constitutional:       General: She is awake.      Appearance: Normal appearance. She is well-developed. She is not ill-appearing, toxic-appearing or diaphoretic.   HENT:      Head: Normocephalic.      Nose: Nose normal.      Mouth/Throat:      Lips: Pink.      Mouth: Mucous membranes are moist.      Pharynx: Oropharynx is clear.      " Comments: Age related dental decay  Eyes:      General: Lids are normal. Vision grossly intact. No allergic shiner.     Extraocular Movements: Extraocular movements intact.      Conjunctiva/sclera: Conjunctivae normal.      Comments: MARINA, R3/L3 with consensual response   Neck:      Vascular: No JVD.      Trachea: Phonation normal.   Cardiovascular:      Rate and Rhythm: Normal rate. Rhythm irregular. Occasional Extrasystoles are present.     Pulses: Normal pulses.           Carotid pulses are 2+ on the right side and 2+ on the left side.       Radial pulses are 2+ on the right side and 2+ on the left side.        Posterior tibial pulses are 2+ on the right side and 2+ on the left side.      Heart sounds: Normal heart sounds.      Comments: Peripheral perfusion intact  Pulmonary:      Effort: Tachypnea and respiratory distress present.      Breath sounds: Decreased air movement present. Examination of the right-middle field reveals wheezing more prominent on exhalation. Examination of the left-middle field reveals wheezing more prominent on exhalation. Examination of the right-lower field reveals decreased breath sounds. Examination of the left-lower field reveals decreased breath sounds.      Comments: Able to speak in full sentences.   Abdominal:      General: Bowel sounds are normal. There is no distension.      Palpations: Abdomen is soft.      Comments: Still experiencing normal bowel movements   Genitourinary:     Comments: No changes to bladder habits  Musculoskeletal:      Cervical back: Full passive range of motion without pain.      Comments: No changes to active/passive ROM, no swelling to joints or pain with movement.   Skin:     General: Skin is warm.      Capillary Refill: Capillary refill takes less than 2 seconds.      Comments: Mild +1 edema to lower extremities extending caudally to mid shin (patient states this is normal), scattered age related skin changes.   Neurological:      General: No focal  "deficit present.      Mental Status: She is alert.      GCS: GCS eye subscore is 4. GCS verbal subscore is 5. GCS motor subscore is 6.      Sensory: Sensation is intact.      Motor: Motor function is intact.      Coordination: Coordination is intact.   Psychiatric:         Attention and Perception: Attention and perception normal.         Mood and Affect: Mood and affect normal.         Behavior: Behavior normal. Behavior is cooperative.         Cognition and Memory: Cognition normal.     Objective:    I have reviewed all medications, laboratory results, and imaging pertinent for today's encounter. Please refer to radiology studies for all historical imaging and interpretations, please refer to EMR \"Results\" tab for all current and historical lab work.     FiO2 (%):  [100 %] 100 %      Intake/Output Summary (Last 24 hours) at 2024 0841  Last data filed at 2024 0700  Gross per 24 hour   Intake 600 ml   Output --   Net 600 ml     Assessment/Plan:    This critically ill patient is currently in the ICU being managed for the following:  -Acute respiratory failure with hypoxia  -Bilateral pneumonia  -Covid+     Neuro/Psych/Pain Ctrl/Sedation:   -DNR-CCA status, ETT if needed for pulmonary concern  -GCS =15  -Acetaminophen for pain/fever control  -CAM ICU score q-shift  -Sleep/wake cycle hygiene  -Delirium precaution  -Q4 neuro assessment     Pulmonary/ENT: (Hx: Asthma)  ##Acute respiratory failure with hypoxia  ##Bilateral PNA  ##c/f tracheomalacia  -c/f tracheomalacia as evidenced on CT-Chest  -ED AB.49, PaCO2 30 torr, PaO2 69 torr, HC03- 22.9  -Maintain SpO2 at/above 92%  -HFNC @ 40L, FiO2 1.0  -High risk respiratory protocol (C&DB, IS use)    -->Order placed for self-proning  -Remdesivir ordered, dexamethasone ordered  -Tessalon pearls for cough  -Home Breo-Ellipta continued  -Home atrovent not continued, duonebs available  -Antibiotics ordered  -Continue to monitor     Cardiovascular: (Hx: HTN, " HLD)  -ECG NSR, continue ongoing telemetry  -Initial trop 42, delta 34, likely demand related  -Maintain MAP >65 mmHg  -Monitor baseline vitals  -Continue home febuxostat, metoprolol, statin  -ASA ordered     Gastrointestinal:  -Regular diet  -Nutritional supplements ordered  -Bowel regimen    -->Silvia-colace QD, Miralax PRN      Genitourinary/FEN:  ##Hypokalemia   -K+ repleted by ED  -Maintain UO >0.5ml/kg/hr  -500ml RL bolus  -Daily labs while in ICU  -Replete electrolytes as needed    -->Mg>2, Phos>3, K+>4  -Avoid nephrotoxins  -Continue home Maxide    Heme/Onc:  -Transfuse for Hgb <7.0 mg/dL  -Daily CBC while in ICU     Endocrine (Hx: Hypothyroidism)  -BGL: 161 on admission  -Maintain BGL <180mg/dL  -Moderate regular SSI  -Dexamethasone 6mg QD x10 days  -Continue home synthroid     Infectious Disease:   ##Covid+  ##PNA  -Tmax = 99.0 on admission  -Mild leukocytosis at 12.7  -ATB: Rocephin/Azithromycin  -Monitor for SIRS/Sepsis  -Initial lactate 3.0    -->follow up at 1200 hrs, 7/21     MSK/SKIN: (Hx: Gout)  -Padding of pressure points  -Daily wound inspection  -Local nursing skin care     Ethics/Code Status:  -Full code, patient can demonstrate capacity     PPX:  -DVT Prophylaxis: Lovenox 40mg QD, SCD  -GI Prophylaxis: None  -Bowel Regimen: Silvia-colace, Miralax     Tubes/Lines/Drains:  -PIV x1     :  Restraints: None     DISPO: Monitor pulmonary status, follow up morning labs, aggressive pulmonary hygiene, clinical course to determine. Continued ICU level care     Seen and Discussed with Dr. Gharibeh     Critical Care Time:  Total face to face time spent with patient/family of 35 minutes critical care time, with >50% of the time spent discussing plan of care/management, counseling/educating on disease processes, explaining results of diagnostic testing.    EDUARDO Phelps, M,Ed., PA-C   - [facility]  Pulmonary/Critical Care Medicine  Secure Chat Preferred

## 2024-07-21 NOTE — CARE PLAN
Problem: Respiratory  Goal: No signs of respiratory distress (eg. Use of accessory muscles. Peds grunting)  Outcome: Progressing  Goal: Verbalize decreased shortness of breath this shift  Outcome: Progressing     Problem: Respiratory  Goal: Wean oxygen to maintain O2 saturation per order/standard this shift  Outcome: Not Progressing

## 2024-07-22 LAB
ALBUMIN SERPL-MCNC: 2.8 G/DL (ref 3.5–5)
ANION GAP SERPL CALC-SCNC: 12 MMOL/L
ATRIAL RATE: 77 BPM
BUN SERPL-MCNC: 27 MG/DL (ref 8–25)
CALCIUM SERPL-MCNC: 9.1 MG/DL (ref 8.5–10.4)
CHLORIDE SERPL-SCNC: 103 MMOL/L (ref 97–107)
CO2 SERPL-SCNC: 24 MMOL/L (ref 24–31)
CREAT SERPL-MCNC: 0.9 MG/DL (ref 0.4–1.6)
EGFRCR SERPLBLD CKD-EPI 2021: 66 ML/MIN/1.73M*2
ERYTHROCYTE [DISTWIDTH] IN BLOOD BY AUTOMATED COUNT: 14.6 % (ref 11.5–14.5)
GLUCOSE BLD MANUAL STRIP-MCNC: 119 MG/DL (ref 74–99)
GLUCOSE BLD MANUAL STRIP-MCNC: 147 MG/DL (ref 74–99)
GLUCOSE BLD MANUAL STRIP-MCNC: 179 MG/DL (ref 74–99)
GLUCOSE BLD MANUAL STRIP-MCNC: 182 MG/DL (ref 74–99)
GLUCOSE BLD MANUAL STRIP-MCNC: 189 MG/DL (ref 74–99)
GLUCOSE BLD MANUAL STRIP-MCNC: 204 MG/DL (ref 74–99)
GLUCOSE SERPL-MCNC: 142 MG/DL (ref 65–99)
HCT VFR BLD AUTO: 37.7 % (ref 36–46)
HGB BLD-MCNC: 12.8 G/DL (ref 12–16)
MAGNESIUM SERPL-MCNC: 2.1 MG/DL (ref 1.6–3.1)
MCH RBC QN AUTO: 29.3 PG (ref 26–34)
MCHC RBC AUTO-ENTMCNC: 34 G/DL (ref 32–36)
MCV RBC AUTO: 86 FL (ref 80–100)
NRBC BLD-RTO: 0 /100 WBCS (ref 0–0)
P AXIS: 46 DEGREES
PHOSPHATE SERPL-MCNC: 3 MG/DL (ref 2.5–4.5)
PLATELET # BLD AUTO: 321 X10*3/UL (ref 150–450)
POTASSIUM SERPL-SCNC: 3.9 MMOL/L (ref 3.4–5.1)
PR INTERVAL: 168 MS
Q ONSET: 215 MS
QRS COUNT: 15 BEATS
QRS DURATION: 82 MS
QT INTERVAL: 372 MS
QTC CALCULATION(BAZETT): 457 MS
QTC FREDERICIA: 427 MS
R AXIS: -58 DEGREES
RBC # BLD AUTO: 4.37 X10*6/UL (ref 4–5.2)
SODIUM SERPL-SCNC: 139 MMOL/L (ref 133–145)
T AXIS: 202 DEGREES
T OFFSET: 401 MS
VENTRICULAR RATE: 91 BPM
WBC # BLD AUTO: 15.8 X10*3/UL (ref 4.4–11.3)

## 2024-07-22 PROCEDURE — 82947 ASSAY GLUCOSE BLOOD QUANT: CPT

## 2024-07-22 PROCEDURE — 2500000002 HC RX 250 W HCPCS SELF ADMINISTERED DRUGS (ALT 637 FOR MEDICARE OP, ALT 636 FOR OP/ED): Performed by: PHYSICIAN ASSISTANT

## 2024-07-22 PROCEDURE — 2500000001 HC RX 250 WO HCPCS SELF ADMINISTERED DRUGS (ALT 637 FOR MEDICARE OP)

## 2024-07-22 PROCEDURE — 36415 COLL VENOUS BLD VENIPUNCTURE: CPT | Performed by: PHYSICIAN ASSISTANT

## 2024-07-22 PROCEDURE — 2500000001 HC RX 250 WO HCPCS SELF ADMINISTERED DRUGS (ALT 637 FOR MEDICARE OP): Performed by: PHYSICIAN ASSISTANT

## 2024-07-22 PROCEDURE — 9420000001 HC RT PATIENT EDUCATION 5 MIN

## 2024-07-22 PROCEDURE — 2500000004 HC RX 250 GENERAL PHARMACY W/ HCPCS (ALT 636 FOR OP/ED): Performed by: PHYSICIAN ASSISTANT

## 2024-07-22 PROCEDURE — 2500000005 HC RX 250 GENERAL PHARMACY W/O HCPCS: Performed by: PHYSICIAN ASSISTANT

## 2024-07-22 PROCEDURE — 2500000001 HC RX 250 WO HCPCS SELF ADMINISTERED DRUGS (ALT 637 FOR MEDICARE OP): Performed by: STUDENT IN AN ORGANIZED HEALTH CARE EDUCATION/TRAINING PROGRAM

## 2024-07-22 PROCEDURE — 2500000002 HC RX 250 W HCPCS SELF ADMINISTERED DRUGS (ALT 637 FOR MEDICARE OP, ALT 636 FOR OP/ED)

## 2024-07-22 PROCEDURE — 85027 COMPLETE CBC AUTOMATED: CPT | Performed by: PHYSICIAN ASSISTANT

## 2024-07-22 PROCEDURE — 2500000002 HC RX 250 W HCPCS SELF ADMINISTERED DRUGS (ALT 637 FOR MEDICARE OP, ALT 636 FOR OP/ED): Performed by: STUDENT IN AN ORGANIZED HEALTH CARE EDUCATION/TRAINING PROGRAM

## 2024-07-22 PROCEDURE — 99291 CRITICAL CARE FIRST HOUR: CPT | Performed by: STUDENT IN AN ORGANIZED HEALTH CARE EDUCATION/TRAINING PROGRAM

## 2024-07-22 PROCEDURE — 83735 ASSAY OF MAGNESIUM: CPT

## 2024-07-22 PROCEDURE — 84100 ASSAY OF PHOSPHORUS: CPT | Performed by: PHYSICIAN ASSISTANT

## 2024-07-22 PROCEDURE — 2500000001 HC RX 250 WO HCPCS SELF ADMINISTERED DRUGS (ALT 637 FOR MEDICARE OP): Performed by: NURSE PRACTITIONER

## 2024-07-22 PROCEDURE — 2020000001 HC ICU ROOM DAILY

## 2024-07-22 RX ORDER — NYSTATIN 100000 [USP'U]/ML
5 SUSPENSION ORAL 4 TIMES DAILY
Status: DISCONTINUED | OUTPATIENT
Start: 2024-07-22 | End: 2024-07-24

## 2024-07-22 RX ORDER — FEBUXOSTAT 80 MG/1
80 TABLET, FILM COATED ORAL 3 TIMES WEEKLY
Status: DISCONTINUED | OUTPATIENT
Start: 2024-07-22 | End: 2024-08-02 | Stop reason: HOSPADM

## 2024-07-22 RX ORDER — POTASSIUM CHLORIDE 20 MEQ/1
20 TABLET, EXTENDED RELEASE ORAL ONCE
Status: COMPLETED | OUTPATIENT
Start: 2024-07-22 | End: 2024-07-22

## 2024-07-22 RX ORDER — AZITHROMYCIN 250 MG/1
250 TABLET, FILM COATED ORAL
Status: COMPLETED | OUTPATIENT
Start: 2024-07-22 | End: 2024-07-25

## 2024-07-22 RX ADMIN — ENOXAPARIN SODIUM 40 MG: 40 INJECTION SUBCUTANEOUS at 08:12

## 2024-07-22 RX ADMIN — CEFTRIAXONE SODIUM 1 G: 1 INJECTION, SOLUTION INTRAVENOUS at 20:10

## 2024-07-22 RX ADMIN — Medication 40 L/MIN: at 20:39

## 2024-07-22 RX ADMIN — FEBUXOSTAT 80 MG: 80 TABLET ORAL at 13:08

## 2024-07-22 RX ADMIN — INSULIN HUMAN 1 UNITS: 100 INJECTION, SOLUTION PARENTERAL at 13:12

## 2024-07-22 RX ADMIN — INSULIN HUMAN 2 UNITS: 100 INJECTION, SOLUTION PARENTERAL at 17:00

## 2024-07-22 RX ADMIN — SENNOSIDES AND DOCUSATE SODIUM 1 TABLET: 50; 8.6 TABLET ORAL at 20:10

## 2024-07-22 RX ADMIN — METOPROLOL TARTRATE 50 MG: 50 TABLET, FILM COATED ORAL at 20:10

## 2024-07-22 RX ADMIN — BENZONATATE 100 MG: 100 CAPSULE ORAL at 07:58

## 2024-07-22 RX ADMIN — LEVOTHYROXINE SODIUM 25 MCG: 0.03 TABLET ORAL at 05:57

## 2024-07-22 RX ADMIN — POTASSIUM CHLORIDE 20 MEQ: 1500 TABLET, EXTENDED RELEASE ORAL at 06:59

## 2024-07-22 RX ADMIN — AZITHROMYCIN DIHYDRATE 250 MG: 250 TABLET ORAL at 09:41

## 2024-07-22 RX ADMIN — ASPIRIN 81 MG 81 MG: 81 TABLET ORAL at 08:12

## 2024-07-22 RX ADMIN — DEXAMETHASONE SODIUM PHOSPHATE 6 MG: 10 INJECTION INTRAMUSCULAR; INTRAVENOUS at 08:12

## 2024-07-22 RX ADMIN — NYSTATIN 500000 UNITS: 100000 SUSPENSION ORAL at 20:10

## 2024-07-22 RX ADMIN — Medication 90 PERCENT: at 08:00

## 2024-07-22 RX ADMIN — TRIAMTERENE AND HYDROCHLOROTHIAZIDE 1 TABLET: 37.5; 25 TABLET ORAL at 08:11

## 2024-07-22 RX ADMIN — NYSTATIN 500000 UNITS: 100000 SUSPENSION ORAL at 17:53

## 2024-07-22 RX ADMIN — Medication 3 MG: at 21:30

## 2024-07-22 RX ADMIN — SIMVASTATIN 40 MG: 40 TABLET, FILM COATED ORAL at 20:10

## 2024-07-22 RX ADMIN — REMDESIVIR 100 MG: 100 INJECTION, POWDER, LYOPHILIZED, FOR SOLUTION INTRAVENOUS at 00:13

## 2024-07-22 RX ADMIN — INSULIN HUMAN 1 UNITS: 100 INJECTION, SOLUTION PARENTERAL at 00:06

## 2024-07-22 RX ADMIN — BENZOCAINE 6 MG-MENTHOL 10 MG LOZENGES 1 LOZENGE: at 02:30

## 2024-07-22 RX ADMIN — BENZOCAINE 6 MG-MENTHOL 10 MG LOZENGES 1 LOZENGE: at 08:11

## 2024-07-22 RX ADMIN — INSULIN HUMAN 2 UNITS: 100 INJECTION, SOLUTION PARENTERAL at 20:08

## 2024-07-22 ASSESSMENT — PAIN - FUNCTIONAL ASSESSMENT
PAIN_FUNCTIONAL_ASSESSMENT: 0-10
PAIN_FUNCTIONAL_ASSESSMENT: FLACC (FACE, LEGS, ACTIVITY, CRY, CONSOLABILITY)
PAIN_FUNCTIONAL_ASSESSMENT: 0-10
PAIN_FUNCTIONAL_ASSESSMENT: 0-10

## 2024-07-22 ASSESSMENT — PAIN SCALES - GENERAL
PAINLEVEL_OUTOF10: 0 - NO PAIN

## 2024-07-22 ASSESSMENT — PAIN SCALES - WONG BAKER
WONGBAKER_NUMERICALRESPONSE: NO HURT
WONGBAKER_NUMERICALRESPONSE: NO HURT

## 2024-07-22 NOTE — CARE PLAN
The patient's goals for the shift include      The clinical goals for the shift include Patiient will wean fro supplemental O2

## 2024-07-22 NOTE — CARE PLAN
The patient's goals for the shift include  getting sleep/rest    The clinical goals for the shift include Patiient will wean fro supplemental O2    Over the shift, the patient did not make progress toward the following goals. Barriers to progression include  Problem: Skin  Goal: Decreased wound size/increased tissue granulation at next dressing change  Outcome: Progressing  Flowsheets (Taken 7/22/2024 0106)  Decreased wound size/increased tissue granulation at next dressing change: Protective dressings over bony prominences  Goal: Participates in plan/prevention/treatment measures  Outcome: Progressing  Flowsheets (Taken 7/22/2024 0106)  Participates in plan/prevention/treatment measures: Elevate heels  Goal: Prevent/manage excess moisture  Outcome: Progressing  Flowsheets (Taken 7/22/2024 0106)  Prevent/manage excess moisture:   Monitor for/manage infection if present   Moisturize dry skin  Goal: Prevent/minimize sheer/friction injuries  Outcome: Progressing  Flowsheets (Taken 7/22/2024 0106)  Prevent/minimize sheer/friction injuries:   Increase activity/out of bed for meals   HOB 30 degrees or less   Complete micro-shifts as needed if patient unable. Adjust patient position to relieve pressure points, not a full turn  Goal: Promote/optimize nutrition  Outcome: Progressing  Flowsheets (Taken 7/22/2024 0106)  Promote/optimize nutrition:   Monitor/record intake including meals   Offer water/supplements/favorite foods  Goal: Promote skin healing  Outcome: Progressing  Flowsheets (Taken 7/22/2024 0106)  Promote skin healing: Protective dressings over bony prominences     Problem: Pain  Goal: Takes deep breaths with improved pain control throughout the shift  Outcome: Progressing  Goal: Turns in bed with improved pain control throughout the shift  Outcome: Progressing  Goal: Walks with improved pain control throughout the shift  Outcome: Progressing  Goal: Performs ADL's with improved pain control throughout  shift  Outcome: Progressing  Goal: Participates in PT with improved pain control throughout the shift  Outcome: Progressing  Goal: Free from opioid side effects throughout the shift  Outcome: Progressing  Goal: Free from acute confusion related to pain meds throughout the shift  Outcome: Progressing   . Recommendations to address these barriers include.

## 2024-07-22 NOTE — PROGRESS NOTES
TCC left message for patient's family to complete assessment. Will follow.         Annabelle Escalante RN

## 2024-07-22 NOTE — PROGRESS NOTES
I have seen the patient either independently or with an associated resident physician or advanced practice provider.    In brief patient is a 80 yo female with a past medical history of asthma who is admitted for respiratory failure with hypoxia secondary to covid pneumonia. Her symptoms have been ongoing for 12 days. CTA chest showed no pulmonary embolism, +bilateral lower lobe consolidation. She remains on HFNC at this time.    Overnight Events: No acute events overnight    Physical Exam  Vitals reviewed.   Constitutional:       General: She is not in acute distress.  HENT:      Mouth/Throat:      Comments: Multiple flat white lesions in oral mucosa, non-tender to palpation.  Eyes:      Pupils: Pupils are equal, round, and reactive to light.   Cardiovascular:      Rate and Rhythm: Normal rate and regular rhythm.   Pulmonary:      Effort: No respiratory distress.      Comments: On HFNC  Abdominal:      Palpations: Abdomen is soft.      Tenderness: There is no abdominal tenderness.   Musculoskeletal:      Cervical back: Normal range of motion.      Right lower leg: No edema.      Left lower leg: No edema.   Skin:     General: Skin is warm and dry.   Neurological:      General: No focal deficit present.      Mental Status: She is alert and oriented to person, place, and time.      Comments: CAM negative         Neuro: No acute issues. Delirium precautions, maintain sleep/wake cycle  Cardiac: Hx of HTN, HLD. Continue home metoprolol, febuxostat. Continue home triamterene-hydrochlorothiazide.  Pulmonary: Acute respiratory failure with hypoxia, covid pneumonia, possible bacterial CAP. HFNC today at 40L 80% FiO2, wean to maintain SpO2 at 92%. Continue Breo Ellipta.  Gastrointestinal: No acute issues. Bowel regimen as needed.  Renal: Hypokalemia improving.   Endocrine: Hx of hypothyroidism. SSI. Home synthroid continued.  Hematology: No acute issues  Infectious Disease: Covid pneumonia, possible bacterial community  acquired pneumonia. Oropharyngeal candidiasis vs early HSV-1 recrudescence - less likely as lesions non-painful. Will continue rocephin, azithromycin for five days. Decadron 6 mg daily for 10 days continued. Remdesevir continued. Nystatin swish and swallow q 6 hours ordered.  Musculoskeletal: No acute issues. Continued home uloric, no signs of gout flare at this time.    Lines/Tubes/Drains: PIV      Prophylaxis: Lovenox  Med to Discontinue: Tessalon pearls    Disposition: ICU    ABCDEF Checklist  Analgesia: Reviewed  Breathing: Not intubated. On HFNC.  Choice of analgesia/sedation: Analgesic and sedative agents adjusted per clinical context.   Delirium assessed by CAM, will avoid exacerbating factors   Early mobility and exercise: Physical and occupational therapy engaged   Family: Plan of care, overall trajectory of patient shared with family. Questions elicited and answered as appropriate.       Due to the high probability of life threatening clinical decompensation, the patient required critical care time evaluating and managing this patient.  Critical care time included obtaining a history, examining the patient, ordering and reviewing studies, discussing, developing, and implementing a management plan, evaluating the patient's response to treatment, and discussion with other care team providers. I saw and evaluated the patient myself.  Critical care time was performed exclusive of billable procedures.    Critical care time: 40 minutes

## 2024-07-23 ENCOUNTER — APPOINTMENT (OUTPATIENT)
Dept: RADIOLOGY | Facility: HOSPITAL | Age: 79
End: 2024-07-23
Payer: MEDICARE

## 2024-07-23 LAB
ALBUMIN SERPL-MCNC: 2.9 G/DL (ref 3.5–5)
ANION GAP SERPL CALC-SCNC: 12 MMOL/L
BASOPHILS # BLD AUTO: 0.05 X10*3/UL (ref 0–0.1)
BASOPHILS NFR BLD AUTO: 0.3 %
BUN SERPL-MCNC: 26 MG/DL (ref 8–25)
CALCIUM SERPL-MCNC: 9.2 MG/DL (ref 8.5–10.4)
CHLORIDE SERPL-SCNC: 102 MMOL/L (ref 97–107)
CO2 SERPL-SCNC: 23 MMOL/L (ref 24–31)
CREAT SERPL-MCNC: 0.8 MG/DL (ref 0.4–1.6)
EGFRCR SERPLBLD CKD-EPI 2021: 76 ML/MIN/1.73M*2
EOSINOPHIL # BLD AUTO: 0 X10*3/UL (ref 0–0.4)
EOSINOPHIL NFR BLD AUTO: 0 %
ERYTHROCYTE [DISTWIDTH] IN BLOOD BY AUTOMATED COUNT: 14.5 % (ref 11.5–14.5)
GLUCOSE BLD MANUAL STRIP-MCNC: 113 MG/DL (ref 74–99)
GLUCOSE BLD MANUAL STRIP-MCNC: 153 MG/DL (ref 74–99)
GLUCOSE BLD MANUAL STRIP-MCNC: 185 MG/DL (ref 74–99)
GLUCOSE BLD MANUAL STRIP-MCNC: 185 MG/DL (ref 74–99)
GLUCOSE SERPL-MCNC: 131 MG/DL (ref 65–99)
HCT VFR BLD AUTO: 38.7 % (ref 36–46)
HGB BLD-MCNC: 13 G/DL (ref 12–16)
IMM GRANULOCYTES # BLD AUTO: 0.27 X10*3/UL (ref 0–0.5)
IMM GRANULOCYTES NFR BLD AUTO: 1.7 % (ref 0–0.9)
LYMPHOCYTES # BLD AUTO: 1.17 X10*3/UL (ref 0.8–3)
LYMPHOCYTES NFR BLD AUTO: 7.3 %
MAGNESIUM SERPL-MCNC: 1.9 MG/DL (ref 1.6–3.1)
MCH RBC QN AUTO: 28.6 PG (ref 26–34)
MCHC RBC AUTO-ENTMCNC: 33.6 G/DL (ref 32–36)
MCV RBC AUTO: 85 FL (ref 80–100)
MONOCYTES # BLD AUTO: 1.07 X10*3/UL (ref 0.05–0.8)
MONOCYTES NFR BLD AUTO: 6.7 %
NEUTROPHILS # BLD AUTO: 13.45 X10*3/UL (ref 1.6–5.5)
NEUTROPHILS NFR BLD AUTO: 84 %
NRBC BLD-RTO: 0 /100 WBCS (ref 0–0)
PHOSPHATE SERPL-MCNC: 3 MG/DL (ref 2.5–4.5)
PLATELET # BLD AUTO: 396 X10*3/UL (ref 150–450)
POTASSIUM SERPL-SCNC: 3.7 MMOL/L (ref 3.4–5.1)
RBC # BLD AUTO: 4.54 X10*6/UL (ref 4–5.2)
SODIUM SERPL-SCNC: 137 MMOL/L (ref 133–145)
WBC # BLD AUTO: 16 X10*3/UL (ref 4.4–11.3)

## 2024-07-23 PROCEDURE — 2500000002 HC RX 250 W HCPCS SELF ADMINISTERED DRUGS (ALT 637 FOR MEDICARE OP, ALT 636 FOR OP/ED)

## 2024-07-23 PROCEDURE — 2500000001 HC RX 250 WO HCPCS SELF ADMINISTERED DRUGS (ALT 637 FOR MEDICARE OP): Performed by: PHYSICIAN ASSISTANT

## 2024-07-23 PROCEDURE — 2500000005 HC RX 250 GENERAL PHARMACY W/O HCPCS: Mod: JZ | Performed by: PHYSICIAN ASSISTANT

## 2024-07-23 PROCEDURE — 71045 X-RAY EXAM CHEST 1 VIEW: CPT

## 2024-07-23 PROCEDURE — 85025 COMPLETE CBC W/AUTO DIFF WBC: CPT

## 2024-07-23 PROCEDURE — 2500000002 HC RX 250 W HCPCS SELF ADMINISTERED DRUGS (ALT 637 FOR MEDICARE OP, ALT 636 FOR OP/ED): Performed by: STUDENT IN AN ORGANIZED HEALTH CARE EDUCATION/TRAINING PROGRAM

## 2024-07-23 PROCEDURE — 71045 X-RAY EXAM CHEST 1 VIEW: CPT | Performed by: RADIOLOGY

## 2024-07-23 PROCEDURE — 2500000002 HC RX 250 W HCPCS SELF ADMINISTERED DRUGS (ALT 637 FOR MEDICARE OP, ALT 636 FOR OP/ED): Performed by: PHYSICIAN ASSISTANT

## 2024-07-23 PROCEDURE — 36415 COLL VENOUS BLD VENIPUNCTURE: CPT

## 2024-07-23 PROCEDURE — 2500000004 HC RX 250 GENERAL PHARMACY W/ HCPCS (ALT 636 FOR OP/ED): Performed by: PHYSICIAN ASSISTANT

## 2024-07-23 PROCEDURE — 99291 CRITICAL CARE FIRST HOUR: CPT

## 2024-07-23 PROCEDURE — 2500000001 HC RX 250 WO HCPCS SELF ADMINISTERED DRUGS (ALT 637 FOR MEDICARE OP)

## 2024-07-23 PROCEDURE — 82947 ASSAY GLUCOSE BLOOD QUANT: CPT

## 2024-07-23 PROCEDURE — 2020000001 HC ICU ROOM DAILY

## 2024-07-23 PROCEDURE — 2500000001 HC RX 250 WO HCPCS SELF ADMINISTERED DRUGS (ALT 637 FOR MEDICARE OP): Performed by: STUDENT IN AN ORGANIZED HEALTH CARE EDUCATION/TRAINING PROGRAM

## 2024-07-23 PROCEDURE — 2500000004 HC RX 250 GENERAL PHARMACY W/ HCPCS (ALT 636 FOR OP/ED): Performed by: STUDENT IN AN ORGANIZED HEALTH CARE EDUCATION/TRAINING PROGRAM

## 2024-07-23 PROCEDURE — 2500000001 HC RX 250 WO HCPCS SELF ADMINISTERED DRUGS (ALT 637 FOR MEDICARE OP): Performed by: NURSE PRACTITIONER

## 2024-07-23 PROCEDURE — 82374 ASSAY BLOOD CARBON DIOXIDE: CPT

## 2024-07-23 PROCEDURE — 83735 ASSAY OF MAGNESIUM: CPT

## 2024-07-23 PROCEDURE — 94660 CPAP INITIATION&MGMT: CPT

## 2024-07-23 PROCEDURE — 2500000005 HC RX 250 GENERAL PHARMACY W/O HCPCS: Performed by: PHYSICIAN ASSISTANT

## 2024-07-23 RX ORDER — POTASSIUM CHLORIDE 20 MEQ/1
40 TABLET, EXTENDED RELEASE ORAL ONCE
Status: COMPLETED | OUTPATIENT
Start: 2024-07-23 | End: 2024-07-23

## 2024-07-23 RX ORDER — POTASSIUM CHLORIDE 20 MEQ/1
20 TABLET, EXTENDED RELEASE ORAL ONCE
Status: DISCONTINUED | OUTPATIENT
Start: 2024-07-23 | End: 2024-07-23

## 2024-07-23 RX ORDER — METOPROLOL TARTRATE 25 MG/1
25 TABLET, FILM COATED ORAL 2 TIMES DAILY
Status: DISCONTINUED | OUTPATIENT
Start: 2024-07-23 | End: 2024-08-02 | Stop reason: HOSPADM

## 2024-07-23 RX ADMIN — METOPROLOL TARTRATE 25 MG: 25 TABLET, FILM COATED ORAL at 20:29

## 2024-07-23 RX ADMIN — NYSTATIN 500000 UNITS: 100000 SUSPENSION ORAL at 20:29

## 2024-07-23 RX ADMIN — Medication 40 L/MIN: at 08:00

## 2024-07-23 RX ADMIN — Medication 3 MG: at 23:06

## 2024-07-23 RX ADMIN — AZITHROMYCIN DIHYDRATE 250 MG: 250 TABLET ORAL at 08:33

## 2024-07-23 RX ADMIN — POTASSIUM CHLORIDE 40 MEQ: 1500 TABLET, EXTENDED RELEASE ORAL at 08:33

## 2024-07-23 RX ADMIN — CEFTRIAXONE SODIUM 1 G: 1 INJECTION, SOLUTION INTRAVENOUS at 20:29

## 2024-07-23 RX ADMIN — NYSTATIN 500000 UNITS: 100000 SUSPENSION ORAL at 18:48

## 2024-07-23 RX ADMIN — DEXAMETHASONE SODIUM PHOSPHATE 6 MG: 10 INJECTION INTRAMUSCULAR; INTRAVENOUS at 08:33

## 2024-07-23 RX ADMIN — NYSTATIN 500000 UNITS: 100000 SUSPENSION ORAL at 14:21

## 2024-07-23 RX ADMIN — NYSTATIN 500000 UNITS: 100000 SUSPENSION ORAL at 06:07

## 2024-07-23 RX ADMIN — BENZOCAINE 6 MG-MENTHOL 10 MG LOZENGES 1 LOZENGE: at 02:36

## 2024-07-23 RX ADMIN — LEVOTHYROXINE SODIUM 25 MCG: 0.03 TABLET ORAL at 06:07

## 2024-07-23 RX ADMIN — REMDESIVIR 100 MG: 100 INJECTION, POWDER, LYOPHILIZED, FOR SOLUTION INTRAVENOUS at 01:10

## 2024-07-23 RX ADMIN — BENZOCAINE 6 MG-MENTHOL 10 MG LOZENGES 1 LOZENGE: at 21:01

## 2024-07-23 RX ADMIN — SIMVASTATIN 40 MG: 40 TABLET, FILM COATED ORAL at 20:29

## 2024-07-23 RX ADMIN — ENOXAPARIN SODIUM 40 MG: 40 INJECTION SUBCUTANEOUS at 08:33

## 2024-07-23 RX ADMIN — TRIAMTERENE AND HYDROCHLOROTHIAZIDE 1 TABLET: 37.5; 25 TABLET ORAL at 08:32

## 2024-07-23 RX ADMIN — Medication 80 PERCENT: at 20:00

## 2024-07-23 RX ADMIN — METOPROLOL TARTRATE 25 MG: 25 TABLET, FILM COATED ORAL at 08:33

## 2024-07-23 RX ADMIN — SENNOSIDES AND DOCUSATE SODIUM 1 TABLET: 50; 8.6 TABLET ORAL at 20:29

## 2024-07-23 RX ADMIN — ASPIRIN 81 MG 81 MG: 81 TABLET ORAL at 08:33

## 2024-07-23 ASSESSMENT — PAIN SCALES - GENERAL
PAINLEVEL_OUTOF10: 0 - NO PAIN

## 2024-07-23 ASSESSMENT — PAIN - FUNCTIONAL ASSESSMENT
PAIN_FUNCTIONAL_ASSESSMENT: 0-10

## 2024-07-23 NOTE — CARE PLAN
Problem: Respiratory  Goal: No signs of respiratory distress (eg. Use of accessory muscles. Peds grunting)  Outcome: Progressing  Goal: Verbalize decreased shortness of breath this shift  Outcome: Progressing  Goal: Wean oxygen to maintain O2 saturation per order/standard this shift  Outcome: Progressing     Problem: Skin  Goal: Decreased wound size/increased tissue granulation at next dressing change  Outcome: Progressing  Flowsheets (Taken 7/23/2024 0740)  Decreased wound size/increased tissue granulation at next dressing change:   Promote sleep for wound healing   Protective dressings over bony prominences   Utilize specialty bed per algorithm  Goal: Participates in plan/prevention/treatment measures  Outcome: Progressing  Flowsheets (Taken 7/23/2024 0740)  Participates in plan/prevention/treatment measures:   Discuss with provider PT/OT consult   Elevate heels   Increase activity/out of bed for meals  Goal: Prevent/manage excess moisture  Outcome: Progressing  Flowsheets (Taken 7/23/2024 0740)  Prevent/manage excess moisture:   Cleanse incontinence/protect with barrier cream   Follow provider orders for dressing changes   Monitor for/manage infection if present   Moisturize dry skin  Goal: Prevent/minimize sheer/friction injuries  Outcome: Progressing  Flowsheets (Taken 7/23/2024 0740)  Prevent/minimize sheer/friction injuries:   Complete micro-shifts as needed if patient unable. Adjust patient position to relieve pressure points, not a full turn   Increase activity/out of bed for meals   Use pull sheet   Utilize specialty bed per algorithm   Turn/reposition every 2 hours/use positioning/transfer devices   HOB 30 degrees or less  Goal: Promote/optimize nutrition  Outcome: Progressing  Flowsheets (Taken 7/23/2024 0740)  Promote/optimize nutrition:   Monitor/record intake including meals   Offer water/supplements/favorite foods  Goal: Promote skin healing  Outcome: Progressing  Flowsheets (Taken 7/23/2024  0740)  Promote skin healing:   Assess skin/pad under line(s)/device(s)   Protective dressings over bony prominences   Turn/reposition every 2 hours/use positioning/transfer devices   Ensure correct size (line/device) and apply per  instructions   Rotate device position/do not position patient on device     Problem: Pain  Goal: Takes deep breaths with improved pain control throughout the shift  Outcome: Progressing  Goal: Turns in bed with improved pain control throughout the shift  Outcome: Progressing  Goal: Walks with improved pain control throughout the shift  Outcome: Progressing  Goal: Performs ADL's with improved pain control throughout shift  Outcome: Progressing  Goal: Participates in PT with improved pain control throughout the shift  Outcome: Progressing  Goal: Free from opioid side effects throughout the shift  Outcome: Progressing  Goal: Free from acute confusion related to pain meds throughout the shift  Outcome: Progressing     Problem: Pain - Adult  Goal: Verbalizes/displays adequate comfort level or baseline comfort level  Outcome: Progressing     Problem: Safety - Adult  Goal: Free from fall injury  Outcome: Progressing     Problem: Discharge Planning  Goal: Discharge to home or other facility with appropriate resources  Outcome: Progressing     Problem: Chronic Conditions and Co-morbidities  Goal: Patient's chronic conditions and co-morbidity symptoms are monitored and maintained or improved  Outcome: Progressing   The patient's goals for the shift include      The clinical goals for the shift include patient able to tolerate wean from oxygen

## 2024-07-23 NOTE — CARE PLAN
The patient's goals for the shift include  feel better    The clinical goals for the shift include patient able to tolerate wean from oxygen      Problem: Respiratory  Goal: No signs of respiratory distress (eg. Use of accessory muscles. Peds grunting)  Outcome: Progressing  Goal: Verbalize decreased shortness of breath this shift  Outcome: Progressing  Goal: Wean oxygen to maintain O2 saturation per order/standard this shift  Outcome: Progressing     Problem: Skin  Goal: Decreased wound size/increased tissue granulation at next dressing change  Outcome: Progressing  Flowsheets (Taken 7/23/2024 0334)  Decreased wound size/increased tissue granulation at next dressing change:   Utilize specialty bed per algorithm   Protective dressings over bony prominences  Goal: Participates in plan/prevention/treatment measures  Outcome: Progressing  Flowsheets (Taken 7/23/2024 0334)  Participates in plan/prevention/treatment measures: Elevate heels  Goal: Prevent/manage excess moisture  Outcome: Progressing  Flowsheets (Taken 7/23/2024 0334)  Prevent/manage excess moisture: Cleanse incontinence/protect with barrier cream  Goal: Prevent/minimize sheer/friction injuries  Outcome: Progressing  Flowsheets (Taken 7/23/2024 0334)  Prevent/minimize sheer/friction injuries:   HOB 30 degrees or less   Complete micro-shifts as needed if patient unable. Adjust patient position to relieve pressure points, not a full turn   Increase activity/out of bed for meals   Turn/reposition every 2 hours/use positioning/transfer devices   Use pull sheet   Utilize specialty bed per algorithm  Goal: Promote/optimize nutrition  Outcome: Progressing  Flowsheets (Taken 7/23/2024 0334)  Promote/optimize nutrition:   Monitor/record intake including meals   Consume > 50% meals/supplements   Offer water/supplements/favorite foods  Goal: Promote skin healing  Outcome: Progressing  Flowsheets (Taken 7/23/2024 0334)  Promote skin healing: Protective dressings over  bony prominences     Problem: Pain  Goal: Takes deep breaths with improved pain control throughout the shift  Outcome: Progressing  Goal: Turns in bed with improved pain control throughout the shift  Outcome: Progressing  Goal: Walks with improved pain control throughout the shift  Outcome: Progressing  Goal: Performs ADL's with improved pain control throughout shift  Outcome: Progressing  Goal: Participates in PT with improved pain control throughout the shift  Outcome: Progressing  Goal: Free from opioid side effects throughout the shift  Outcome: Progressing  Goal: Free from acute confusion related to pain meds throughout the shift  Outcome: Progressing     Problem: Pain - Adult  Goal: Verbalizes/displays adequate comfort level or baseline comfort level  Outcome: Progressing     Problem: Safety - Adult  Goal: Free from fall injury  Outcome: Progressing     Problem: Discharge Planning  Goal: Discharge to home or other facility with appropriate resources  Outcome: Progressing     Problem: Chronic Conditions and Co-morbidities  Goal: Patient's chronic conditions and co-morbidity symptoms are monitored and maintained or improved  Outcome: Progressing

## 2024-07-23 NOTE — NURSING NOTE
Assumed care of pt. Pt is resting in bed with no needs at this time. Plan of care discussed, questions and concerns addressed.

## 2024-07-23 NOTE — NURSING NOTE
Pt sitting up in chair, tolerating well, pt states she is starting to feel better. No needs at this time

## 2024-07-24 ENCOUNTER — APPOINTMENT (OUTPATIENT)
Dept: RADIOLOGY | Facility: HOSPITAL | Age: 79
End: 2024-07-24
Payer: MEDICARE

## 2024-07-24 ENCOUNTER — APPOINTMENT (OUTPATIENT)
Dept: CARDIOLOGY | Facility: HOSPITAL | Age: 79
End: 2024-07-24
Payer: MEDICARE

## 2024-07-24 LAB
ALBUMIN SERPL-MCNC: 2.8 G/DL (ref 3.5–5)
ANION GAP SERPL CALC-SCNC: 10 MMOL/L
BASOPHILS # BLD AUTO: 0.06 X10*3/UL (ref 0–0.1)
BASOPHILS NFR BLD AUTO: 0.4 %
BUN SERPL-MCNC: 26 MG/DL (ref 8–25)
CALCIUM SERPL-MCNC: 9.2 MG/DL (ref 8.5–10.4)
CHLORIDE SERPL-SCNC: 101 MMOL/L (ref 97–107)
CO2 SERPL-SCNC: 26 MMOL/L (ref 24–31)
CREAT SERPL-MCNC: 0.9 MG/DL (ref 0.4–1.6)
EGFRCR SERPLBLD CKD-EPI 2021: 66 ML/MIN/1.73M*2
EOSINOPHIL # BLD AUTO: 0.01 X10*3/UL (ref 0–0.4)
EOSINOPHIL NFR BLD AUTO: 0.1 %
ERYTHROCYTE [DISTWIDTH] IN BLOOD BY AUTOMATED COUNT: 14.3 % (ref 11.5–14.5)
GLUCOSE BLD MANUAL STRIP-MCNC: 129 MG/DL (ref 74–99)
GLUCOSE BLD MANUAL STRIP-MCNC: 182 MG/DL (ref 74–99)
GLUCOSE BLD MANUAL STRIP-MCNC: 196 MG/DL (ref 74–99)
GLUCOSE SERPL-MCNC: 127 MG/DL (ref 65–99)
HCT VFR BLD AUTO: 41.2 % (ref 36–46)
HGB BLD-MCNC: 13.7 G/DL (ref 12–16)
IMM GRANULOCYTES # BLD AUTO: 0.34 X10*3/UL (ref 0–0.5)
IMM GRANULOCYTES NFR BLD AUTO: 2.1 % (ref 0–0.9)
LYMPHOCYTES # BLD AUTO: 1.58 X10*3/UL (ref 0.8–3)
LYMPHOCYTES NFR BLD AUTO: 9.7 %
MAGNESIUM SERPL-MCNC: 1.9 MG/DL (ref 1.6–3.1)
MCH RBC QN AUTO: 28.7 PG (ref 26–34)
MCHC RBC AUTO-ENTMCNC: 33.3 G/DL (ref 32–36)
MCV RBC AUTO: 86 FL (ref 80–100)
MONOCYTES # BLD AUTO: 1.05 X10*3/UL (ref 0.05–0.8)
MONOCYTES NFR BLD AUTO: 6.5 %
NEUTROPHILS # BLD AUTO: 13.19 X10*3/UL (ref 1.6–5.5)
NEUTROPHILS NFR BLD AUTO: 81.2 %
NRBC BLD-RTO: 0 /100 WBCS (ref 0–0)
PHOSPHATE SERPL-MCNC: 3 MG/DL (ref 2.5–4.5)
PLATELET # BLD AUTO: 416 X10*3/UL (ref 150–450)
POTASSIUM SERPL-SCNC: 4 MMOL/L (ref 3.4–5.1)
RBC # BLD AUTO: 4.78 X10*6/UL (ref 4–5.2)
SODIUM SERPL-SCNC: 137 MMOL/L (ref 133–145)
WBC # BLD AUTO: 16.2 X10*3/UL (ref 4.4–11.3)

## 2024-07-24 PROCEDURE — 2500000001 HC RX 250 WO HCPCS SELF ADMINISTERED DRUGS (ALT 637 FOR MEDICARE OP): Performed by: NURSE PRACTITIONER

## 2024-07-24 PROCEDURE — 83735 ASSAY OF MAGNESIUM: CPT

## 2024-07-24 PROCEDURE — 71045 X-RAY EXAM CHEST 1 VIEW: CPT | Performed by: STUDENT IN AN ORGANIZED HEALTH CARE EDUCATION/TRAINING PROGRAM

## 2024-07-24 PROCEDURE — 2500000004 HC RX 250 GENERAL PHARMACY W/ HCPCS (ALT 636 FOR OP/ED): Performed by: STUDENT IN AN ORGANIZED HEALTH CARE EDUCATION/TRAINING PROGRAM

## 2024-07-24 PROCEDURE — 71045 X-RAY EXAM CHEST 1 VIEW: CPT

## 2024-07-24 PROCEDURE — 2500000005 HC RX 250 GENERAL PHARMACY W/O HCPCS: Performed by: STUDENT IN AN ORGANIZED HEALTH CARE EDUCATION/TRAINING PROGRAM

## 2024-07-24 PROCEDURE — 2500000002 HC RX 250 W HCPCS SELF ADMINISTERED DRUGS (ALT 637 FOR MEDICARE OP, ALT 636 FOR OP/ED): Performed by: PHYSICIAN ASSISTANT

## 2024-07-24 PROCEDURE — 2500000001 HC RX 250 WO HCPCS SELF ADMINISTERED DRUGS (ALT 637 FOR MEDICARE OP): Performed by: STUDENT IN AN ORGANIZED HEALTH CARE EDUCATION/TRAINING PROGRAM

## 2024-07-24 PROCEDURE — 2500000001 HC RX 250 WO HCPCS SELF ADMINISTERED DRUGS (ALT 637 FOR MEDICARE OP): Performed by: PHYSICIAN ASSISTANT

## 2024-07-24 PROCEDURE — 99291 CRITICAL CARE FIRST HOUR: CPT | Performed by: STUDENT IN AN ORGANIZED HEALTH CARE EDUCATION/TRAINING PROGRAM

## 2024-07-24 PROCEDURE — 93005 ELECTROCARDIOGRAM TRACING: CPT

## 2024-07-24 PROCEDURE — 2500000005 HC RX 250 GENERAL PHARMACY W/O HCPCS: Mod: JZ | Performed by: PHYSICIAN ASSISTANT

## 2024-07-24 PROCEDURE — 2500000002 HC RX 250 W HCPCS SELF ADMINISTERED DRUGS (ALT 637 FOR MEDICARE OP, ALT 636 FOR OP/ED): Performed by: STUDENT IN AN ORGANIZED HEALTH CARE EDUCATION/TRAINING PROGRAM

## 2024-07-24 PROCEDURE — 82947 ASSAY GLUCOSE BLOOD QUANT: CPT

## 2024-07-24 PROCEDURE — 36415 COLL VENOUS BLD VENIPUNCTURE: CPT

## 2024-07-24 PROCEDURE — 2500000004 HC RX 250 GENERAL PHARMACY W/ HCPCS (ALT 636 FOR OP/ED): Performed by: PHYSICIAN ASSISTANT

## 2024-07-24 PROCEDURE — 80069 RENAL FUNCTION PANEL: CPT

## 2024-07-24 PROCEDURE — 2020000001 HC ICU ROOM DAILY

## 2024-07-24 PROCEDURE — 85025 COMPLETE CBC W/AUTO DIFF WBC: CPT

## 2024-07-24 PROCEDURE — 94660 CPAP INITIATION&MGMT: CPT

## 2024-07-24 RX ADMIN — Medication 40 L/MIN: at 11:00

## 2024-07-24 RX ADMIN — SENNOSIDES AND DOCUSATE SODIUM 1 TABLET: 50; 8.6 TABLET ORAL at 20:59

## 2024-07-24 RX ADMIN — CEFTRIAXONE SODIUM 1 G: 1 INJECTION, SOLUTION INTRAVENOUS at 20:59

## 2024-07-24 RX ADMIN — METOPROLOL TARTRATE 25 MG: 25 TABLET, FILM COATED ORAL at 08:07

## 2024-07-24 RX ADMIN — DEXAMETHASONE SODIUM PHOSPHATE 6 MG: 10 INJECTION INTRAMUSCULAR; INTRAVENOUS at 08:01

## 2024-07-24 RX ADMIN — FEBUXOSTAT 80 MG: 80 TABLET ORAL at 08:02

## 2024-07-24 RX ADMIN — TRIAMTERENE AND HYDROCHLOROTHIAZIDE 1 TABLET: 37.5; 25 TABLET ORAL at 08:02

## 2024-07-24 RX ADMIN — Medication 75 PERCENT: at 23:00

## 2024-07-24 RX ADMIN — Medication 75 PERCENT: at 19:00

## 2024-07-24 RX ADMIN — ASPIRIN 81 MG 81 MG: 81 TABLET ORAL at 08:02

## 2024-07-24 RX ADMIN — Medication 40 L/MIN: at 07:00

## 2024-07-24 RX ADMIN — AZITHROMYCIN DIHYDRATE 250 MG: 250 TABLET ORAL at 08:02

## 2024-07-24 RX ADMIN — LEVOTHYROXINE SODIUM 25 MCG: 0.03 TABLET ORAL at 06:01

## 2024-07-24 RX ADMIN — METOPROLOL TARTRATE 25 MG: 25 TABLET, FILM COATED ORAL at 20:59

## 2024-07-24 RX ADMIN — REMDESIVIR 100 MG: 100 INJECTION, POWDER, LYOPHILIZED, FOR SOLUTION INTRAVENOUS at 01:09

## 2024-07-24 RX ADMIN — Medication 40 L/MIN: at 15:00

## 2024-07-24 RX ADMIN — ENOXAPARIN SODIUM 40 MG: 40 INJECTION SUBCUTANEOUS at 08:01

## 2024-07-24 RX ADMIN — SIMVASTATIN 40 MG: 40 TABLET, FILM COATED ORAL at 20:59

## 2024-07-24 SDOH — SOCIAL STABILITY: SOCIAL INSECURITY
WITHIN THE LAST YEAR, HAVE TO BEEN RAPED OR FORCED TO HAVE ANY KIND OF SEXUAL ACTIVITY BY YOUR PARTNER OR EX-PARTNER?: PATIENT UNABLE TO ANSWER

## 2024-07-24 SDOH — HEALTH STABILITY: MENTAL HEALTH: HOW OFTEN DO YOU HAVE 6 OR MORE DRINKS ON ONE OCCASION?: NEVER

## 2024-07-24 SDOH — ECONOMIC STABILITY: FOOD INSECURITY: WITHIN THE PAST 12 MONTHS, THE FOOD YOU BOUGHT JUST DIDN'T LAST AND YOU DIDN'T HAVE MONEY TO GET MORE.: NEVER TRUE

## 2024-07-24 SDOH — ECONOMIC STABILITY: INCOME INSECURITY: HOW HARD IS IT FOR YOU TO PAY FOR THE VERY BASICS LIKE FOOD, HOUSING, MEDICAL CARE, AND HEATING?: NOT HARD AT ALL

## 2024-07-24 SDOH — SOCIAL STABILITY: SOCIAL INSECURITY
WITHIN THE LAST YEAR, HAVE YOU BEEN KICKED, HIT, SLAPPED, OR OTHERWISE PHYSICALLY HURT BY YOUR PARTNER OR EX-PARTNER?: PATIENT UNABLE TO ANSWER

## 2024-07-24 SDOH — HEALTH STABILITY: MENTAL HEALTH
STRESS IS WHEN SOMEONE FEELS TENSE, NERVOUS, ANXIOUS, OR CAN'T SLEEP AT NIGHT BECAUSE THEIR MIND IS TROUBLED. HOW STRESSED ARE YOU?: NOT AT ALL

## 2024-07-24 SDOH — SOCIAL STABILITY: SOCIAL NETWORK
DO YOU BELONG TO ANY CLUBS OR ORGANIZATIONS SUCH AS CHURCH GROUPS UNIONS, FRATERNAL OR ATHLETIC GROUPS, OR SCHOOL GROUPS?: PATIENT UNABLE TO ANSWER

## 2024-07-24 SDOH — HEALTH STABILITY: MENTAL HEALTH: HOW OFTEN DO YOU HAVE A DRINK CONTAINING ALCOHOL?: NEVER

## 2024-07-24 SDOH — ECONOMIC STABILITY: FOOD INSECURITY: WITHIN THE PAST 12 MONTHS, YOU WORRIED THAT YOUR FOOD WOULD RUN OUT BEFORE YOU GOT MONEY TO BUY MORE.: NEVER TRUE

## 2024-07-24 SDOH — ECONOMIC STABILITY: TRANSPORTATION INSECURITY
IN THE PAST 12 MONTHS, HAS LACK OF TRANSPORTATION KEPT YOU FROM MEETINGS, WORK, OR FROM GETTING THINGS NEEDED FOR DAILY LIVING?: NO

## 2024-07-24 SDOH — ECONOMIC STABILITY: INCOME INSECURITY: IN THE LAST 12 MONTHS, WAS THERE A TIME WHEN YOU WERE NOT ABLE TO PAY THE MORTGAGE OR RENT ON TIME?: NO

## 2024-07-24 SDOH — SOCIAL STABILITY: SOCIAL INSECURITY: WITHIN THE LAST YEAR, HAVE YOU BEEN AFRAID OF YOUR PARTNER OR EX-PARTNER?: PATIENT UNABLE TO ANSWER

## 2024-07-24 SDOH — HEALTH STABILITY: MENTAL HEALTH
HOW OFTEN DO YOU NEED TO HAVE SOMEONE HELP YOU WHEN YOU READ INSTRUCTIONS, PAMPHLETS, OR OTHER WRITTEN MATERIAL FROM YOUR DOCTOR OR PHARMACY?: NEVER

## 2024-07-24 SDOH — SOCIAL STABILITY: SOCIAL NETWORK: HOW OFTEN DO YOU GET TOGETHER WITH FRIENDS OR RELATIVES?: PATIENT UNABLE TO ANSWER

## 2024-07-24 SDOH — HEALTH STABILITY: PHYSICAL HEALTH: ON AVERAGE, HOW MANY MINUTES DO YOU ENGAGE IN EXERCISE AT THIS LEVEL?: PATIENT UNABLE TO ANSWER

## 2024-07-24 SDOH — ECONOMIC STABILITY: INCOME INSECURITY: IN THE PAST 12 MONTHS, HAS THE ELECTRIC, GAS, OIL, OR WATER COMPANY THREATENED TO SHUT OFF SERVICE IN YOUR HOME?: NO

## 2024-07-24 SDOH — ECONOMIC STABILITY: TRANSPORTATION INSECURITY
IN THE PAST 12 MONTHS, HAS THE LACK OF TRANSPORTATION KEPT YOU FROM MEDICAL APPOINTMENTS OR FROM GETTING MEDICATIONS?: NO

## 2024-07-24 SDOH — ECONOMIC STABILITY: HOUSING INSECURITY: IN THE PAST 12 MONTHS, HOW MANY TIMES HAVE YOU MOVED WHERE YOU WERE LIVING?: 0

## 2024-07-24 SDOH — ECONOMIC STABILITY: HOUSING INSECURITY: AT ANY TIME IN THE PAST 12 MONTHS, WERE YOU HOMELESS OR LIVING IN A SHELTER (INCLUDING NOW)?: NO

## 2024-07-24 SDOH — SOCIAL STABILITY: SOCIAL NETWORK: ARE YOU MARRIED, WIDOWED, DIVORCED, SEPARATED, NEVER MARRIED, OR LIVING WITH A PARTNER?: PATIENT UNABLE TO ANSWER

## 2024-07-24 SDOH — SOCIAL STABILITY: SOCIAL NETWORK: HOW OFTEN DO YOU ATTEND CHURCH OR RELIGIOUS SERVICES?: PATIENT UNABLE TO ANSWER

## 2024-07-24 SDOH — SOCIAL STABILITY: SOCIAL NETWORK: IN A TYPICAL WEEK, HOW MANY TIMES DO YOU TALK ON THE PHONE WITH FAMILY, FRIENDS, OR NEIGHBORS?: PATIENT UNABLE TO ANSWER

## 2024-07-24 SDOH — SOCIAL STABILITY: SOCIAL NETWORK: HOW OFTEN DO YOU ATTENT MEETINGS OF THE CLUB OR ORGANIZATION YOU BELONG TO?: PATIENT UNABLE TO ANSWER

## 2024-07-24 SDOH — HEALTH STABILITY: PHYSICAL HEALTH
ON AVERAGE, HOW MANY DAYS PER WEEK DO YOU ENGAGE IN MODERATE TO STRENUOUS EXERCISE (LIKE A BRISK WALK)?: PATIENT UNABLE TO ANSWER

## 2024-07-24 SDOH — HEALTH STABILITY: MENTAL HEALTH: HOW MANY STANDARD DRINKS CONTAINING ALCOHOL DO YOU HAVE ON A TYPICAL DAY?: PATIENT DOES NOT DRINK

## 2024-07-24 SDOH — SOCIAL STABILITY: SOCIAL INSECURITY
WITHIN THE LAST YEAR, HAVE YOU BEEN HUMILIATED OR EMOTIONALLY ABUSED IN OTHER WAYS BY YOUR PARTNER OR EX-PARTNER?: PATIENT UNABLE TO ANSWER

## 2024-07-24 ASSESSMENT — COGNITIVE AND FUNCTIONAL STATUS - GENERAL
MOBILITY SCORE: 24
DAILY ACTIVITIY SCORE: 24

## 2024-07-24 ASSESSMENT — PAIN SCALES - GENERAL
PAINLEVEL_OUTOF10: 0 - NO PAIN

## 2024-07-24 ASSESSMENT — LIFESTYLE VARIABLES
SKIP TO QUESTIONS 9-10: 1
AUDIT-C TOTAL SCORE: 0

## 2024-07-24 ASSESSMENT — PAIN - FUNCTIONAL ASSESSMENT
PAIN_FUNCTIONAL_ASSESSMENT: 0-10

## 2024-07-24 ASSESSMENT — ACTIVITIES OF DAILY LIVING (ADL): LACK_OF_TRANSPORTATION: NO

## 2024-07-24 ASSESSMENT — PAIN SCALES - WONG BAKER
WONGBAKER_NUMERICALRESPONSE: NO HURT

## 2024-07-24 NOTE — NURSING NOTE
Head to toe assessment completed, no skin breakdown noted. Pt on overlay mattress with bilateral heal protectors and sacral border applied.

## 2024-07-24 NOTE — CARE PLAN
Problem: Respiratory  Goal: No signs of respiratory distress (eg. Use of accessory muscles. Peds grunting)  Outcome: Progressing  Goal: Verbalize decreased shortness of breath this shift  Outcome: Progressing  Goal: Wean oxygen to maintain O2 saturation per order/standard this shift  Outcome: Progressing     Problem: Skin  Goal: Decreased wound size/increased tissue granulation at next dressing change  Outcome: Progressing  Flowsheets (Taken 7/24/2024 0749)  Decreased wound size/increased tissue granulation at next dressing change:   Promote sleep for wound healing   Protective dressings over bony prominences   Utilize specialty bed per algorithm  Goal: Participates in plan/prevention/treatment measures  Outcome: Progressing  Flowsheets (Taken 7/24/2024 0749)  Participates in plan/prevention/treatment measures:   Elevate heels   Increase activity/out of bed for meals  Goal: Prevent/manage excess moisture  Outcome: Progressing  Flowsheets (Taken 7/24/2024 0749)  Prevent/manage excess moisture:   Cleanse incontinence/protect with barrier cream   Follow provider orders for dressing changes   Monitor for/manage infection if present   Moisturize dry skin  Goal: Prevent/minimize sheer/friction injuries  Outcome: Progressing  Flowsheets (Taken 7/24/2024 0749)  Prevent/minimize sheer/friction injuries:   Increase activity/out of bed for meals   Use pull sheet   HOB 30 degrees or less   Turn/reposition every 2 hours/use positioning/transfer devices   Utilize specialty bed per algorithm  Goal: Promote/optimize nutrition  Outcome: Progressing  Flowsheets (Taken 7/24/2024 0749)  Promote/optimize nutrition:   Monitor/record intake including meals   Offer water/supplements/favorite foods  Goal: Promote skin healing  Outcome: Progressing  Flowsheets (Taken 7/24/2024 0749)  Promote skin healing:   Assess skin/pad under line(s)/device(s)   Protective dressings over bony prominences   Turn/reposition every 2 hours/use  positioning/transfer devices   Ensure correct size (line/device) and apply per  instructions   Rotate device position/do not position patient on device     Problem: Pain  Goal: Takes deep breaths with improved pain control throughout the shift  Outcome: Progressing  Goal: Turns in bed with improved pain control throughout the shift  Outcome: Progressing  Goal: Walks with improved pain control throughout the shift  Outcome: Progressing  Goal: Performs ADL's with improved pain control throughout shift  Outcome: Progressing  Goal: Participates in PT with improved pain control throughout the shift  Outcome: Progressing  Goal: Free from opioid side effects throughout the shift  Outcome: Progressing  Goal: Free from acute confusion related to pain meds throughout the shift  Outcome: Progressing     Problem: Pain - Adult  Goal: Verbalizes/displays adequate comfort level or baseline comfort level  Outcome: Progressing     Problem: Safety - Adult  Goal: Free from fall injury  Outcome: Progressing     Problem: Discharge Planning  Goal: Discharge to home or other facility with appropriate resources  Outcome: Progressing     Problem: Chronic Conditions and Co-morbidities  Goal: Patient's chronic conditions and co-morbidity symptoms are monitored and maintained or improved  Outcome: Progressing   The patient's goals for the shift include      The clinical goals for the shift include improve respiratory status    Over the shift, the patient did make progress toward the following goals. Barriers to progression include

## 2024-07-24 NOTE — PROGRESS NOTES
[facility] Critical Care Medicine       Date:  7/23/2024  Patient:  Irma Baumann  YOB: 1945  MRN:  [id number]   Admit Date:  7/20/2024    Chief Complaint   Patient presents with    Shortness of Breath     10 days ago started having a cough. Hx. Asthma. Now SOB and hypoxic at . Inhaler not helping much.    Cough         History of Present Illness:  In brief patient is a 78 yo female with a past medical history of asthma who is admitted for respiratory failure with hypoxia secondary to covid pneumonia. Her symptoms have been ongoing for 12 days. CTA chest showed no pulmonary embolism, +bilateral lower lobe consolidation. She remains on HFNC at this time.       Interval ICU Events:  7/23: overnight pt became bradycardic with HR in 40s-50s after metoprolol given, did occur 2 nights prior as well. Dose decreased today, will monitor HR overnight. Remains on HFNC 40L/80%, up in chair most of the day, work of breathing appears improved.     Medical History:  Past Medical History:   Diagnosis Date    Personal history of other diseases of the circulatory system     History of hypertension    Personal history of other diseases of the respiratory system     History of asthma     Past Surgical History:   Procedure Laterality Date    OTHER SURGICAL HISTORY  11/30/2022    Hysterectomy    OTHER SURGICAL HISTORY  11/30/2022    Sinus surgery     Medications Prior to Admission   Medication Sig Dispense Refill Last Dose    albuterol 108 (90 Base) MCG/ACT inhaler Inhale 2 puffs every 6 hours if needed.       albuterol 2.5 mg /3 mL (0.083 %) nebulizer solution every 6 hours.  3 ml as needed Inhalation       budesonide-formoteroL (Symbicort) 160-4.5 mcg/actuation inhaler Inhale 2 puffs 2 times a day.       cholecalciferol, vitamin D3, (VITAMIN D3 ORAL) Take 1 capsule by mouth once daily.       colchicine, gout, (Colcrys) 0.6 mg tablet Take 1 tablet (0.6 mg) by mouth every other day. Gout       econazole nitrate 1 %  cream APPLY TO AFFECTED AREAS UNDER BREASTS TWICE PER DAY AS NEEDED FOR FLARES       febuxostat (Uloric) 80 mg tablet Take 1 tablet (80 mg) by mouth once daily. 90 tablet 3     ibuprofen (AdviL) 200 mg tablet Take 3 tablets (600 mg) by mouth every 6 hours if needed (pain).       ipratropium (Atrovent) 42 mcg (0.06 %) nasal spray 1 spray 3 times a day.       levothyroxine (Synthroid, Levoxyl) 25 mcg tablet TAKE 1 TABLET BY MOUTH IN THE  MORNING ON AN EMPTY STOMACH 90 tablet 3     metoprolol tartrate (Lopressor) 50 mg tablet Take 1 tablet by mouth 2 times a day.       potassium chloride CR (Klor-Con M20) 20 mEq ER tablet Take 1 tablet (20 mEq) by mouth once daily. With food 90 tablet 3     simvastatin (Zocor) 40 mg tablet TAKE 1 TABLET BY MOUTH ONCE  DAILY IN THE EVENING 90 tablet 3     triamcinolone (Kenalog) 0.5 % cream Apply topically 2 times a day. 15 g 0     triamterene-hydrochlorothiazid (Maxzide) 75-50 mg tablet Take 0.5 tablets by mouth once daily. 45 tablet 3      Levofloxacin, Allopurinol, Cephalexin, Ciprofloxacin, Sulfamethoxazole-trimethoprim, Colchicine, and Penicillins  Social History     Tobacco Use    Smoking status: Never     Passive exposure: Never    Smokeless tobacco: Never   Vaping Use    Vaping status: Never Used   Substance Use Topics    Alcohol use: Yes     Comment: sometimes    Drug use: Never     Family History   Problem Relation Name Age of Onset    Hypertension Mother      Other (cva) Mother      Diabetes Mother      Stroke Mother      Hypertension Father      Other (cva) Father      Stroke Father      Diabetes Sister      Heart disease Brother      No Known Problems Son      No Known Problems Son         Hospital Medications:           Current Facility-Administered Medications:     acetaminophen (Tylenol) tablet 650 mg, 650 mg, oral, q6h PRN, Lawrence Crowder PA-C    albuterol 2.5 mg /3 mL (0.083 %) nebulizer solution 2.5 mg, 2.5 mg, nebulization, q4h PRN, Lawrence Crowder PA-C     aspirin chewable tablet 81 mg, 81 mg, oral, Daily, Fregoso MANNY Shook, APRN-CNP, 81 mg at 07/23/24 0833    azithromycin (Zithromax) tablet 250 mg, 250 mg, oral, q24h HANNA, Marbin Moody DO, 250 mg at 07/23/24 0833    benzocaine-menthol (Cepastat Sore Throat) lozenge 1 lozenge, 1 lozenge, Mouth/Throat, q2h PRN, Rahul Thompson PA-C, 1 lozenge at 07/23/24 2101    cefTRIAXone (Rocephin) 1 g in dextrose (iso) IV 50 mL, 1 g, intravenous, q24h, Marbin Moody DO, Stopped at 07/23/24 2059    dexAMETHasone (Decadron) injection 6 mg, 6 mg, intravenous, q AM, Lawrence Crowder PA-C, 6 mg at 07/23/24 0833    dextrose 50 % injection 12.5 g, 12.5 g, intravenous, q15 min PRN, Lawrence Crowder PA-C    dextrose 50 % injection 25 g, 25 g, intravenous, q15 min PRN, Lawrence Crowder PA-C    enoxaparin (Lovenox) syringe 40 mg, 40 mg, subcutaneous, q24h, Lawrence Crowder PA-C, 40 mg at 07/23/24 0833    febuxostat (Uloric) tablet 80 mg, 80 mg, oral, Once per day on Monday Wednesday Friday, Marbin Moody DO, 80 mg at 07/22/24 1308    glucagon (Glucagen) injection 1 mg, 1 mg, intramuscular, q15 min PRN, Lawrence Crowder PA-C    glucagon (Glucagen) injection 1 mg, 1 mg, intramuscular, q15 min PRN, Lawrence Crowder PA-C    insulin regular (HumuLIN R,NovoLIN R) injection 0-5 Units, 0-5 Units, subcutaneous, With meals & nightly, Daniel Desai PA-C, 1 Units at 07/23/24 2103    levothyroxine (Synthroid, Levoxyl) tablet 25 mcg, 25 mcg, oral, Daily before breakfast, Lawrence Crowder PA-C, 25 mcg at 07/23/24 0607    loperamide (Imodium) capsule 2 mg, 2 mg, oral, 4x daily PRN, Tarek R Gharibeh, MD    melatonin tablet 3 mg, 3 mg, oral, Nightly PRN, Lawrence Crowder PA-C, 3 mg at 07/22/24 2130    metoprolol tartrate (Lopressor) tablet 25 mg, 25 mg, oral, BID, Marbin Moody DO, 25 mg at 07/23/24 2029    nystatin (Mycostatin) 100,000 unit/mL suspension 500,000 Units, 5 mL, Swish & Swallow, 4x daily, Marbin Moody DO, 500,000 Units at  07/23/24 2029    oxygen (O2) therapy, , inhalation, Continuous - Inhalation, Lawrence Crowder PA-C, 40 L/min at 07/23/24 0800    oxygen (O2) therapy, , inhalation, Continuous PRN - O2/gases, Lawrence Crowder PA-C, 40 L/min at 07/21/24 2053    polyethylene glycol (Glycolax, Miralax) packet 17 g, 17 g, oral, Daily PRN, Lawrence Crowder PA-C    [COMPLETED] remdesivir (Veklury) 200 mg in sodium chloride 0.9% 250 mL IV, 200 mg, intravenous, Once, Stopped at 07/21/24 0146 **FOLLOWED BY** remdesivir (Veklury) 100 mg in sodium chloride 0.9% 250 mL IV, 100 mg, intravenous, q24h, Lawrence Crowder PA-C, Stopped at 07/23/24 0140    sennosides-docusate sodium (Silvia-Colace) 8.6-50 mg per tablet 1 tablet, 1 tablet, oral, Nightly, Lawrence Crowder PA-C, 1 tablet at 07/23/24 2029    simvastatin (Zocor) tablet 40 mg, 40 mg, oral, q PM, Lawrence Crowder PA-C, 40 mg at 07/23/24 2029    triamterene-hydrochlorothiazid (Maxzide-25) 37.5-25 mg per tablet 1 tablet, 1 tablet, oral, Daily, Lawrence Crowder PA-C, 1 tablet at 07/23/24 0832    Review of Systems:  14 point review of systems was completed and negative except for those specially mention in my HPI    Physical Exam:    Heart Rate:  [41-81]   Temp:  [36.2 °C (97.2 °F)-36.4 °C (97.5 °F)]   Resp:  [17-30]   BP: (118-151)/()   Weight:  [77.8 kg (171 lb 8.3 oz)]   SpO2:  [89 %-95 %]     Physical Exam  Constitutional:       General: She is not in acute distress.     Appearance: She is not diaphoretic.      Interventions: Nasal cannula in place.   HENT:      Head: Normocephalic and atraumatic.   Eyes:      Extraocular Movements: Extraocular movements intact.      Conjunctiva/sclera: Conjunctivae normal.      Pupils: Pupils are equal, round, and reactive to light.   Cardiovascular:      Rate and Rhythm: Normal rate and regular rhythm.   Pulmonary:      Effort: Tachypnea present. No respiratory distress.      Breath sounds: Decreased air movement present. Examination of the  right-upper field reveals wheezing. Examination of the left-upper field reveals wheezing. Wheezing present.   Abdominal:      General: Bowel sounds are normal. There is no distension.      Palpations: Abdomen is soft.      Tenderness: There is no abdominal tenderness.   Musculoskeletal:      Right lower leg: No edema.      Left lower leg: No edema.   Skin:     General: Skin is warm.      Capillary Refill: Capillary refill takes less than 2 seconds.   Neurological:      General: No focal deficit present.      Mental Status: She is alert and oriented to person, place, and time.         Objective:    I have reviewed all medications, laboratory results, and imaging pertinent for today's encounter.    FiO2 (%):  [80 %] 80 %      Intake/Output Summary (Last 24 hours) at 7/23/2024 2118  Last data filed at 7/23/2024 2059  Gross per 24 hour   Intake 530 ml   Output 500 ml   Net 30 ml         Assessment/Plan:    I am currently managing this critically ill patient for the following problems:    Neuro/Psych/Pain Ctrl/Sedation: pt is AOx  No active concerns   - Pain Control: tylenol PRN   - Melatonin PRN nightly to aid in sleep   - CAM ICU qshift, sleep-wake hygiene, delirium precautions     Respiratory/ENT:  Acute hypoxic respiratory failure   COVID PNA   Concerns for possible superimposed bacterial PNA   - Supplemental O2: HFNC 40L/80%  - Wean FiO2 as tolerated to maintain SpO2 >92%  - Continue decadron x10 days  - Continue breo ellipta   - See empiric abx below   - Continuous pulse ox monitoring   - Pulm hygiene    Cardiovascular: hemodynamically stable/unstable  Sinus bradycardia - after evening BB dosing   Hx HTN  - Metoprolol 50mg BID decreased to 25 mg BID today   - Continue home simvastatin, aspirin  - Maintain MAPs >65  - Continuous cardiac monitoring per ICU protocol  - EKGs PRN for ACS symptoms, arrhythmias     GI:  No active concerns   - Diet: cardiac  - BR: miralax PRN     Renal/Volume Status/Electrolytes:  No  active concerns   - Maintain urine output 0.5-1.0cc/kg/hr  - Hourly I/O's  - Replete electrolytes to maintain K >4.0 and Mg >2.0  - Daily BMP, Mg, Phos    Endocrine:  Hx hypothyroidism   - SSI ACHS, can deescalate if glucose remains stable in the setting of steroid administration   - Continue home synthroid   - Hypoglycemia protocol PRN     Infectious Disease:  Oral candidiasis - resolving   COVID PNA   Concerns for bacterial CAP  - Antibiotics: rocephin and azithromycin x5 days, continue remdesivir, nystatin   - Monitor SIRS criteria    Heme/Onc:  No active concerns   - Monitor for s/sx of anemia such as bleeding and bruising   - Transfuse if Hgb <7.0   - Daily CBC    MSK:  Physical deconditioning in the setting of respiratory distress   - Pt has been sitting up in chair x multiple hours the last few days and does report moderate fatigue   - PT/OT when appropriate    Derm:  - ICU skin protocol  - Pt able to turn herself   - Padded pressure points     Ethics/Code Status:  DNR-CCA, ok with intubation     :  DVT Prophylaxis: lovenox  GI Prophylaxis: none  Bowel Regimen: miralax PRN  Diet: cardiac  CVC: none  Shira: none  Gaines: none  Restraints: none  Disposition: ICU    Critical Care Time:  45 minutes spent in preparing to see patient (I.e. labs, imaging, etc.), documentation, discussion plan of care with patient/family/caregiver, and/or coordination of care with multidisciplinary team including the attending. Time does not include completion of procedure time.     Daniel Desai PA-C  Pulmonary & Critical Care Medicine   [facility]

## 2024-07-24 NOTE — CARE PLAN
Problem: Respiratory  Goal: No signs of respiratory distress (eg. Use of accessory muscles. Peds grunting)  Outcome: Progressing  Goal: Verbalize decreased shortness of breath this shift  Outcome: Progressing  Goal: Wean oxygen to maintain O2 saturation per order/standard this shift  Outcome: Progressing     Problem: Skin  Goal: Decreased wound size/increased tissue granulation at next dressing change  Outcome: Progressing  Flowsheets (Taken 7/24/2024 1958)  Decreased wound size/increased tissue granulation at next dressing change:   Promote sleep for wound healing   Protective dressings over bony prominences   Utilize specialty bed per algorithm  Goal: Participates in plan/prevention/treatment measures  Outcome: Progressing  Flowsheets (Taken 7/24/2024 1958)  Participates in plan/prevention/treatment measures:   Discuss with provider PT/OT consult   Elevate heels   Increase activity/out of bed for meals  Goal: Prevent/manage excess moisture  Outcome: Progressing  Flowsheets (Taken 7/24/2024 1958)  Prevent/manage excess moisture:   Cleanse incontinence/protect with barrier cream   Monitor for/manage infection if present   Moisturize dry skin  Goal: Prevent/minimize sheer/friction injuries  Outcome: Progressing  Flowsheets (Taken 7/24/2024 1958)  Prevent/minimize sheer/friction injuries:   Use pull sheet   HOB 30 degrees or less   Turn/reposition every 2 hours/use positioning/transfer devices  Goal: Promote/optimize nutrition  Outcome: Progressing  Flowsheets (Taken 7/24/2024 1958)  Promote/optimize nutrition:   Monitor/record intake including meals   Offer water/supplements/favorite foods  Goal: Promote skin healing  Outcome: Progressing  Flowsheets (Taken 7/24/2024 1958)  Promote skin healing: Turn/reposition every 2 hours/use positioning/transfer devices     Problem: Pain  Goal: Takes deep breaths with improved pain control throughout the shift  Outcome: Progressing  Goal: Turns in bed with improved pain control  throughout the shift  Outcome: Progressing  Goal: Walks with improved pain control throughout the shift  Outcome: Progressing  Goal: Performs ADL's with improved pain control throughout shift  Outcome: Progressing  Goal: Participates in PT with improved pain control throughout the shift  Outcome: Progressing  Goal: Free from opioid side effects throughout the shift  Outcome: Progressing  Goal: Free from acute confusion related to pain meds throughout the shift  Outcome: Progressing     Problem: Pain - Adult  Goal: Verbalizes/displays adequate comfort level or baseline comfort level  Outcome: Progressing  Flowsheets (Taken 7/24/2024 1958)  Verbalizes/displays adequate comfort level or baseline comfort level:   Encourage patient to monitor pain and request assistance   Assess pain using appropriate pain scale   Administer analgesics based on type and severity of pain and evaluate response   Implement non-pharmacological measures as appropriate and evaluate response   Consider cultural and social influences on pain and pain management   Notify Licensed Independent Practitioner if interventions unsuccessful or patient reports new pain     Problem: Safety - Adult  Goal: Free from fall injury  Outcome: Progressing  Flowsheets (Taken 7/24/2024 1958)  Free from fall injury:   Instruct family/caregiver on patient safety   Based on caregiver fall risk screen, instruct family/caregiver to ask for assistance with transferring infant if caregiver noted to have fall risk factors     Problem: Discharge Planning  Goal: Discharge to home or other facility with appropriate resources  Outcome: Progressing  Flowsheets (Taken 7/24/2024 1958)  Discharge to home or other facility with appropriate resources:   Identify barriers to discharge with patient and caregiver   Arrange for needed discharge resources and transportation as appropriate   Identify discharge learning needs (meds, wound care, etc)   Arrange for interpreters to assist at  discharge as needed   Refer to discharge planning if patient needs post-hospital services based on physician order or complex needs related to functional status, cognitive ability or social support system     Problem: Chronic Conditions and Co-morbidities  Goal: Patient's chronic conditions and co-morbidity symptoms are monitored and maintained or improved  Outcome: Progressing  Flowsheets (Taken 7/24/2024 1958)  Care Plan - Patient's Chronic Conditions and Co-Morbidity Symptoms are Monitored and Maintained or Improved:   Monitor and assess patient's chronic conditions and comorbid symptoms for stability, deterioration, or improvement   Collaborate with multidisciplinary team to address chronic and comorbid conditions and prevent exacerbation or deterioration   Update acute care plan with appropriate goals if chronic or comorbid symptoms are exacerbated and prevent overall improvement and discharge   The patient's goals for the shift include      The clinical goals for the shift include hemodynamically stable    Over the shift, the patient did not make progress toward the following goals. Barriers to progression include . Recommendations to address these barriers include .

## 2024-07-24 NOTE — PROGRESS NOTES
I have seen the patient either independently or with an associated resident physician or advanced practice provider    Overnight Events: No acute events overnight, patient was able to sleep without difficulty.     Physical Exam  Constitutional:       General: She is not in acute distress.  HENT:      Mouth/Throat:      Mouth: Mucous membranes are moist.      Comments: No plaque or ulceration observed at oral mucosa  Eyes:      Pupils: Pupils are equal, round, and reactive to light.   Cardiovascular:      Rate and Rhythm: Normal rate and regular rhythm.   Pulmonary:      Effort: No respiratory distress.      Comments: On 40L 90% FiO2.  Abdominal:      Palpations: Abdomen is soft.      Tenderness: There is no abdominal tenderness.   Musculoskeletal:         General: Normal range of motion.      Cervical back: Normal range of motion.   Skin:     General: Skin is warm and dry.   Neurological:      General: No focal deficit present.      Mental Status: She is alert and oriented to person, place, and time.      Comments: CAM negative       Neuro: No acute issues. Delirium precautions, maintain sleep/wake cycle  Cardiac: Hx of HTN, HLD. Continue metoprolol 25 mg BID, febuxostat. Continue home triamterene-hydrochlorothiazide.  Pulmonary: Acute respiratory failure with hypoxia, covid pneumonia, possible bacterial CAP. HFNC today at 40L 90% FiO2, wean to maintain SpO2 at 92%. Albuterol every 4h prn for wheezing. Breo ellipta held due to candidiasis.  Gastrointestinal: No acute issues. Bowel regimen as needed.  Renal: Hypokalemia resolved.   Endocrine: Hx of hypothyroidism. SSI. Home synthroid continued.  Hematology: No acute issues  Infectious Disease: Covid pneumonia, possible bacterial community acquired pneumonia. Oropharyngeal candidiasis improving. Will continue rocephin, azithromycin for five days. Decadron 6 mg daily for 10 days continued. Remdesevir continued. Nystatin swish and swallow q 6 hours for another 24 hours  post resolution of symptoms.  Musculoskeletal: No acute issues. Continued home uloric, no signs of gout flare at this time.     Lines/Tubes/Drains: PIV        Prophylaxis: Lovenox  Med to Discontinue: None     Disposition: ICU     ABCDEF Checklist  Analgesia: Reviewed  Breathing: Not intubated. On HFNC.  Choice of analgesia/sedation: Analgesic and sedative agents adjusted per clinical context.   Delirium assessed by CAM, will avoid exacerbating factors   Early mobility and exercise: Physical and occupational therapy engaged   Family: Plan of care, overall trajectory of patient shared with family. Questions elicited and answered as appropriate.         Due to the high probability of life threatening clinical decompensation, the patient required critical care time evaluating and managing this patient.  Critical care time included obtaining a history, examining the patient, ordering and reviewing studies, discussing, developing, and implementing a management plan, evaluating the patient's response to treatment, and discussion with other care team providers. I saw and evaluated the patient myself.  Critical care time was performed exclusive of billable procedures.    Critical care time: 40 minutes

## 2024-07-24 NOTE — PROGRESS NOTES
07/24/24 1521   Physical Activity   On average, how many days per week do you engage in moderate to strenuous exercise (like a brisk walk)? Pt Unable   On average, how many minutes do you engage in exercise at this level? Pt Unable   Financial Resource Strain   How hard is it for you to pay for the very basics like food, housing, medical care, and heating? Not hard   Housing Stability   In the last 12 months, was there a time when you were not able to pay the mortgage or rent on time? N   In the past 12 months, how many times have you moved where you were living? 0   At any time in the past 12 months, were you homeless or living in a shelter (including now)? N   Transportation Needs   In the past 12 months, has lack of transportation kept you from medical appointments or from getting medications? no   In the past 12 months, has lack of transportation kept you from meetings, work, or from getting things needed for daily living? No   Food Insecurity   Within the past 12 months, you worried that your food would run out before you got the money to buy more. Never true   Within the past 12 months, the food you bought just didn't last and you didn't have money to get more. Never true   Stress   Do you feel stress - tense, restless, nervous, or anxious, or unable to sleep at night because your mind is troubled all the time - these days? Not at all   Social Connections   In a typical week, how many times do you talk on the phone with family, friends, or neighbors? Pt Unable   How often do you get together with friends or relatives? Pt Unable   How often do you attend Methodist or Church services? Pt Unable   Do you belong to any clubs or organizations such as Methodist groups, unions, fraternal or athletic groups, or school groups? Pt Unable   How often do you attend meetings of the clubs or organizations you belong to? Pt Unable   Are you , , , , never , or living with a partner? Pt  Unable   Intimate Partner Violence   Within the last year, have you been afraid of your partner or ex-partner? Pt Unable   Within the last year, have you been humiliated or emotionally abused in other ways by your partner or ex-partner? Pt Unable   Within the last year, have you been kicked, hit, slapped, or otherwise physically hurt by your partner or ex-partner? Pt Unable   Within the last year, have you been raped or forced to have any kind of sexual activity by your partner or ex-partner? Pt Unable   Alcohol Use   Q1: How often do you have a drink containing alcohol? Never   Q2: How many drinks containing alcohol do you have on a typical day when you are drinking? None   Q3: How often do you have six or more drinks on one occasion? Never   Utilities   In the past 12 months has the electric, gas, oil, or water company threatened to shut off services in your home? No   Health Literacy   How often do you need to have someone help you when you read instructions, pamphlets, or other written material from your doctor or pharmacy? Never

## 2024-07-24 NOTE — CARE PLAN
The patient's goals for the shift include feel better     The clinical goals for the shift include improve respiratory status      Problem: Respiratory  Goal: No signs of respiratory distress (eg. Use of accessory muscles. Peds grunting)  Outcome: Progressing  Goal: Verbalize decreased shortness of breath this shift  Outcome: Progressing  Goal: Wean oxygen to maintain O2 saturation per order/standard this shift  Outcome: Progressing     Problem: Skin  Goal: Decreased wound size/increased tissue granulation at next dressing change  Outcome: Progressing  Flowsheets (Taken 7/24/2024 0315)  Decreased wound size/increased tissue granulation at next dressing change:   Protective dressings over bony prominences   Utilize specialty bed per algorithm  Goal: Participates in plan/prevention/treatment measures  Outcome: Progressing  Flowsheets (Taken 7/24/2024 0315)  Participates in plan/prevention/treatment measures:   Discuss with provider PT/OT consult   Increase activity/out of bed for meals   Elevate heels  Goal: Prevent/manage excess moisture  Outcome: Progressing  Flowsheets (Taken 7/24/2024 0315)  Prevent/manage excess moisture:   Cleanse incontinence/protect with barrier cream   Moisturize dry skin  Goal: Prevent/minimize sheer/friction injuries  Outcome: Progressing  Flowsheets (Taken 7/24/2024 0315)  Prevent/minimize sheer/friction injuries:   HOB 30 degrees or less   Increase activity/out of bed for meals   Turn/reposition every 2 hours/use positioning/transfer devices   Use pull sheet   Utilize specialty bed per algorithm  Goal: Promote/optimize nutrition  Outcome: Progressing  Flowsheets (Taken 7/24/2024 0315)  Promote/optimize nutrition:   Monitor/record intake including meals   Consume > 50% meals/supplements  Goal: Promote skin healing  Outcome: Progressing  Flowsheets (Taken 7/24/2024 0315)  Promote skin healing:   Assess skin/pad under line(s)/device(s)   Ensure correct size (line/device) and apply per   instructions   Protective dressings over bony prominences   Rotate device position/do not position patient on device   Turn/reposition every 2 hours/use positioning/transfer devices     Problem: Pain  Goal: Takes deep breaths with improved pain control throughout the shift  Outcome: Progressing  Goal: Turns in bed with improved pain control throughout the shift  Outcome: Progressing  Goal: Walks with improved pain control throughout the shift  Outcome: Progressing  Goal: Performs ADL's with improved pain control throughout shift  Outcome: Progressing  Goal: Participates in PT with improved pain control throughout the shift  Outcome: Progressing  Goal: Free from opioid side effects throughout the shift  Outcome: Progressing  Goal: Free from acute confusion related to pain meds throughout the shift  Outcome: Progressing     Problem: Pain - Adult  Goal: Verbalizes/displays adequate comfort level or baseline comfort level  Outcome: Progressing     Problem: Safety - Adult  Goal: Free from fall injury  Outcome: Progressing     Problem: Discharge Planning  Goal: Discharge to home or other facility with appropriate resources  Outcome: Progressing     Problem: Chronic Conditions and Co-morbidities  Goal: Patient's chronic conditions and co-morbidity symptoms are monitored and maintained or improved  Outcome: Progressing

## 2024-07-24 NOTE — PROGRESS NOTES
TCC spoke to patient's spouse, Gus, on the phone. Assessment complete. Patient lives with her spouse. Patient is independent. Patient drives and is able to shop, cook and clean. Patient follows Dr. Toro Everett. Patient fills prescriptions at [facility] [place]. At this time the discharge plan is for patient to return home. Will follow.      07/24/24 2478   Discharge Planning   Living Arrangements Spouse/significant other   Support Systems Spouse/significant other   Type of Residence Private residence   Home or Post Acute Services None   Expected Discharge Disposition Home   Does the patient need discharge transport arranged? No   Financial Resource Strain   How hard is it for you to pay for the very basics like food, housing, medical care, and heating? Not hard   Housing Stability   In the last 12 months, was there a time when you were not able to pay the mortgage or rent on time? N   In the past 12 months, how many times have you moved where you were living? 0   At any time in the past 12 months, were you homeless or living in a shelter (including now)? N   Transportation Needs   In the past 12 months, has lack of transportation kept you from medical appointments or from getting medications? no   In the past 12 months, has lack of transportation kept you from meetings, work, or from getting things needed for daily living? No

## 2024-07-25 ENCOUNTER — APPOINTMENT (OUTPATIENT)
Dept: RADIOLOGY | Facility: HOSPITAL | Age: 79
End: 2024-07-25
Payer: MEDICARE

## 2024-07-25 LAB
ALBUMIN SERPL-MCNC: 2.8 G/DL (ref 3.5–5)
ANION GAP SERPL CALC-SCNC: 11 MMOL/L
BASOPHILS # BLD AUTO: 0.08 X10*3/UL (ref 0–0.1)
BASOPHILS NFR BLD AUTO: 0.5 %
BUN SERPL-MCNC: 25 MG/DL (ref 8–25)
CALCIUM SERPL-MCNC: 9.2 MG/DL (ref 8.5–10.4)
CHLORIDE SERPL-SCNC: 101 MMOL/L (ref 97–107)
CO2 SERPL-SCNC: 27 MMOL/L (ref 24–31)
CREAT SERPL-MCNC: 0.9 MG/DL (ref 0.4–1.6)
EGFRCR SERPLBLD CKD-EPI 2021: 66 ML/MIN/1.73M*2
EOSINOPHIL # BLD AUTO: 0.03 X10*3/UL (ref 0–0.4)
EOSINOPHIL NFR BLD AUTO: 0.2 %
ERYTHROCYTE [DISTWIDTH] IN BLOOD BY AUTOMATED COUNT: 14 % (ref 11.5–14.5)
GLUCOSE BLD MANUAL STRIP-MCNC: 104 MG/DL (ref 74–99)
GLUCOSE BLD MANUAL STRIP-MCNC: 126 MG/DL (ref 74–99)
GLUCOSE BLD MANUAL STRIP-MCNC: 218 MG/DL (ref 74–99)
GLUCOSE BLD MANUAL STRIP-MCNC: 256 MG/DL (ref 74–99)
GLUCOSE SERPL-MCNC: 106 MG/DL (ref 65–99)
HCT VFR BLD AUTO: 39.5 % (ref 36–46)
HGB BLD-MCNC: 13.1 G/DL (ref 12–16)
IMM GRANULOCYTES # BLD AUTO: 0.51 X10*3/UL (ref 0–0.5)
IMM GRANULOCYTES NFR BLD AUTO: 2.9 % (ref 0–0.9)
LYMPHOCYTES # BLD AUTO: 1.89 X10*3/UL (ref 0.8–3)
LYMPHOCYTES NFR BLD AUTO: 10.9 %
MAGNESIUM SERPL-MCNC: 1.7 MG/DL (ref 1.6–3.1)
MCH RBC QN AUTO: 28.4 PG (ref 26–34)
MCHC RBC AUTO-ENTMCNC: 33.2 G/DL (ref 32–36)
MCV RBC AUTO: 86 FL (ref 80–100)
MONOCYTES # BLD AUTO: 0.97 X10*3/UL (ref 0.05–0.8)
MONOCYTES NFR BLD AUTO: 5.6 %
NEUTROPHILS # BLD AUTO: 13.93 X10*3/UL (ref 1.6–5.5)
NEUTROPHILS NFR BLD AUTO: 79.9 %
NRBC BLD-RTO: 0 /100 WBCS (ref 0–0)
PHOSPHATE SERPL-MCNC: 4 MG/DL (ref 2.5–4.5)
PLATELET # BLD AUTO: 403 X10*3/UL (ref 150–450)
POTASSIUM SERPL-SCNC: 3.4 MMOL/L (ref 3.4–5.1)
RBC # BLD AUTO: 4.61 X10*6/UL (ref 4–5.2)
SODIUM SERPL-SCNC: 139 MMOL/L (ref 133–145)
WBC # BLD AUTO: 17.4 X10*3/UL (ref 4.4–11.3)

## 2024-07-25 PROCEDURE — 71045 X-RAY EXAM CHEST 1 VIEW: CPT | Performed by: RADIOLOGY

## 2024-07-25 PROCEDURE — 9420000001 HC RT PATIENT EDUCATION 5 MIN

## 2024-07-25 PROCEDURE — 2500000001 HC RX 250 WO HCPCS SELF ADMINISTERED DRUGS (ALT 637 FOR MEDICARE OP): Performed by: NURSE PRACTITIONER

## 2024-07-25 PROCEDURE — 71045 X-RAY EXAM CHEST 1 VIEW: CPT

## 2024-07-25 PROCEDURE — 2500000002 HC RX 250 W HCPCS SELF ADMINISTERED DRUGS (ALT 637 FOR MEDICARE OP, ALT 636 FOR OP/ED): Performed by: STUDENT IN AN ORGANIZED HEALTH CARE EDUCATION/TRAINING PROGRAM

## 2024-07-25 PROCEDURE — 84100 ASSAY OF PHOSPHORUS: CPT

## 2024-07-25 PROCEDURE — 2500000004 HC RX 250 GENERAL PHARMACY W/ HCPCS (ALT 636 FOR OP/ED): Performed by: PHYSICIAN ASSISTANT

## 2024-07-25 PROCEDURE — 2500000001 HC RX 250 WO HCPCS SELF ADMINISTERED DRUGS (ALT 637 FOR MEDICARE OP)

## 2024-07-25 PROCEDURE — 83735 ASSAY OF MAGNESIUM: CPT

## 2024-07-25 PROCEDURE — 36415 COLL VENOUS BLD VENIPUNCTURE: CPT

## 2024-07-25 PROCEDURE — 2500000001 HC RX 250 WO HCPCS SELF ADMINISTERED DRUGS (ALT 637 FOR MEDICARE OP): Performed by: PHYSICIAN ASSISTANT

## 2024-07-25 PROCEDURE — 2500000005 HC RX 250 GENERAL PHARMACY W/O HCPCS: Performed by: STUDENT IN AN ORGANIZED HEALTH CARE EDUCATION/TRAINING PROGRAM

## 2024-07-25 PROCEDURE — 2020000001 HC ICU ROOM DAILY

## 2024-07-25 PROCEDURE — 82947 ASSAY GLUCOSE BLOOD QUANT: CPT

## 2024-07-25 PROCEDURE — 2500000005 HC RX 250 GENERAL PHARMACY W/O HCPCS: Mod: JZ | Performed by: PHYSICIAN ASSISTANT

## 2024-07-25 PROCEDURE — 2500000004 HC RX 250 GENERAL PHARMACY W/ HCPCS (ALT 636 FOR OP/ED): Performed by: STUDENT IN AN ORGANIZED HEALTH CARE EDUCATION/TRAINING PROGRAM

## 2024-07-25 PROCEDURE — 94660 CPAP INITIATION&MGMT: CPT

## 2024-07-25 PROCEDURE — 85025 COMPLETE CBC W/AUTO DIFF WBC: CPT

## 2024-07-25 PROCEDURE — 2500000002 HC RX 250 W HCPCS SELF ADMINISTERED DRUGS (ALT 637 FOR MEDICARE OP, ALT 636 FOR OP/ED): Performed by: PHYSICIAN ASSISTANT

## 2024-07-25 PROCEDURE — 99291 CRITICAL CARE FIRST HOUR: CPT | Performed by: STUDENT IN AN ORGANIZED HEALTH CARE EDUCATION/TRAINING PROGRAM

## 2024-07-25 PROCEDURE — 2500000001 HC RX 250 WO HCPCS SELF ADMINISTERED DRUGS (ALT 637 FOR MEDICARE OP): Performed by: STUDENT IN AN ORGANIZED HEALTH CARE EDUCATION/TRAINING PROGRAM

## 2024-07-25 RX ORDER — METOPROLOL TARTRATE 25 MG/1
12.5 TABLET, FILM COATED ORAL ONCE
Status: COMPLETED | OUTPATIENT
Start: 2024-07-25 | End: 2024-07-25

## 2024-07-25 RX ORDER — GUAIFENESIN 100 MG/5ML
200 SOLUTION ORAL EVERY 4 HOURS PRN
Status: DISCONTINUED | OUTPATIENT
Start: 2024-07-25 | End: 2024-08-02 | Stop reason: HOSPADM

## 2024-07-25 RX ADMIN — GUAIFENESIN 200 MG: 200 SOLUTION ORAL at 02:03

## 2024-07-25 RX ADMIN — Medication 70 PERCENT: at 23:00

## 2024-07-25 RX ADMIN — SIMVASTATIN 40 MG: 40 TABLET, FILM COATED ORAL at 20:16

## 2024-07-25 RX ADMIN — CEFTRIAXONE SODIUM 1 G: 1 INJECTION, SOLUTION INTRAVENOUS at 20:13

## 2024-07-25 RX ADMIN — Medication 70 PERCENT: at 19:00

## 2024-07-25 RX ADMIN — REMDESIVIR 100 MG: 100 INJECTION, POWDER, LYOPHILIZED, FOR SOLUTION INTRAVENOUS at 00:14

## 2024-07-25 RX ADMIN — Medication 35 L/MIN: at 07:00

## 2024-07-25 RX ADMIN — Medication 35 L/MIN: at 11:00

## 2024-07-25 RX ADMIN — METOPROLOL TARTRATE 25 MG: 25 TABLET, FILM COATED ORAL at 08:20

## 2024-07-25 RX ADMIN — BENZOCAINE 6 MG-MENTHOL 10 MG LOZENGES 1 LOZENGE: at 20:24

## 2024-07-25 RX ADMIN — AZITHROMYCIN DIHYDRATE 250 MG: 250 TABLET ORAL at 08:20

## 2024-07-25 RX ADMIN — LEVOTHYROXINE SODIUM 25 MCG: 0.03 TABLET ORAL at 06:12

## 2024-07-25 RX ADMIN — GUAIFENESIN 200 MG: 200 SOLUTION ORAL at 20:24

## 2024-07-25 RX ADMIN — ASPIRIN 81 MG 81 MG: 81 TABLET ORAL at 08:20

## 2024-07-25 RX ADMIN — TRIAMTERENE AND HYDROCHLOROTHIAZIDE 1 TABLET: 37.5; 25 TABLET ORAL at 08:20

## 2024-07-25 RX ADMIN — Medication 80 PERCENT: at 03:00

## 2024-07-25 RX ADMIN — DEXAMETHASONE SODIUM PHOSPHATE 6 MG: 10 INJECTION INTRAMUSCULAR; INTRAVENOUS at 08:20

## 2024-07-25 RX ADMIN — SENNOSIDES AND DOCUSATE SODIUM 1 TABLET: 50; 8.6 TABLET ORAL at 20:17

## 2024-07-25 RX ADMIN — ENOXAPARIN SODIUM 40 MG: 40 INJECTION SUBCUTANEOUS at 08:20

## 2024-07-25 RX ADMIN — METOPROLOL TARTRATE 12.5 MG: 25 TABLET, FILM COATED ORAL at 20:24

## 2024-07-25 RX ADMIN — Medication 40 L/MIN: at 15:00

## 2024-07-25 ASSESSMENT — PAIN SCALES - GENERAL
PAINLEVEL_OUTOF10: 0 - NO PAIN
PAINLEVEL_OUTOF10: 0 - NO PAIN

## 2024-07-25 ASSESSMENT — PAIN - FUNCTIONAL ASSESSMENT: PAIN_FUNCTIONAL_ASSESSMENT: 0-10

## 2024-07-25 NOTE — CARE PLAN
Problem: Respiratory  Goal: No signs of respiratory distress (eg. Use of accessory muscles. Peds grunting)  Outcome: Progressing  Goal: Verbalize decreased shortness of breath this shift  Outcome: Progressing  Goal: Wean oxygen to maintain O2 saturation per order/standard this shift  Outcome: Progressing     Problem: Skin  Goal: Decreased wound size/increased tissue granulation at next dressing change  Outcome: Progressing  Flowsheets (Taken 7/25/2024 0725)  Decreased wound size/increased tissue granulation at next dressing change:   Protective dressings over bony prominences   Utilize specialty bed per algorithm   Promote sleep for wound healing  Goal: Participates in plan/prevention/treatment measures  Outcome: Progressing  Flowsheets (Taken 7/25/2024 0725)  Participates in plan/prevention/treatment measures:   Discuss with provider PT/OT consult   Elevate heels   Increase activity/out of bed for meals  Goal: Prevent/manage excess moisture  Outcome: Progressing  Flowsheets (Taken 7/25/2024 0725)  Prevent/manage excess moisture:   Cleanse incontinence/protect with barrier cream   Monitor for/manage infection if present   Moisturize dry skin  Goal: Prevent/minimize sheer/friction injuries  Outcome: Progressing  Flowsheets (Taken 7/25/2024 0725)  Prevent/minimize sheer/friction injuries:   Complete micro-shifts as needed if patient unable. Adjust patient position to relieve pressure points, not a full turn   Increase activity/out of bed for meals   Use pull sheet   HOB 30 degrees or less   Turn/reposition every 2 hours/use positioning/transfer devices   Utilize specialty bed per algorithm  Goal: Promote/optimize nutrition  Outcome: Progressing  Flowsheets (Taken 7/25/2024 0725)  Promote/optimize nutrition:   Monitor/record intake including meals   Offer water/supplements/favorite foods  Goal: Promote skin healing  Outcome: Progressing  Flowsheets (Taken 7/25/2024 0725)  Promote skin healing:   Assess skin/pad under  line(s)/device(s)   Protective dressings over bony prominences   Turn/reposition every 2 hours/use positioning/transfer devices   Ensure correct size (line/device) and apply per  instructions   Rotate device position/do not position patient on device     Problem: Pain  Goal: Takes deep breaths with improved pain control throughout the shift  Outcome: Progressing  Goal: Turns in bed with improved pain control throughout the shift  Outcome: Progressing  Goal: Walks with improved pain control throughout the shift  Outcome: Progressing  Goal: Performs ADL's with improved pain control throughout shift  Outcome: Progressing  Goal: Participates in PT with improved pain control throughout the shift  Outcome: Progressing  Goal: Free from opioid side effects throughout the shift  Outcome: Progressing  Goal: Free from acute confusion related to pain meds throughout the shift  Outcome: Progressing     Problem: Pain - Adult  Goal: Verbalizes/displays adequate comfort level or baseline comfort level  Outcome: Progressing     Problem: Safety - Adult  Goal: Free from fall injury  Outcome: Progressing     Problem: Discharge Planning  Goal: Discharge to home or other facility with appropriate resources  Outcome: Progressing     Problem: Chronic Conditions and Co-morbidities  Goal: Patient's chronic conditions and co-morbidity symptoms are monitored and maintained or improved  Outcome: Progressing   The patient's goals for the shift include      The clinical goals for the shift include hemodynamically stable

## 2024-07-25 NOTE — PROGRESS NOTES
Patient not medically clear. Patient on 80% high flow oxygen. At this time the discharge plan is for patient to return home with no skilled services. Will follow.      07/25/24 1425   Discharge Planning   Home or Post Acute Services None   Expected Discharge Disposition Home   Does the patient need discharge transport arranged? No

## 2024-07-25 NOTE — PROGRESS NOTES
I have seen the patient either independently or with an associated resident physician or advanced practice provider    Overnight Events: No acute events overnight, continued weaning of HFNC    Physical Exam  Vitals reviewed.   Constitutional:       General: She is not in acute distress.  HENT:      Mouth/Throat:      Mouth: Mucous membranes are moist.   Eyes:      Pupils: Pupils are equal, round, and reactive to light.   Cardiovascular:      Rate and Rhythm: Normal rate and regular rhythm.   Pulmonary:      Effort: Pulmonary effort is normal. No respiratory distress.   Abdominal:      Tenderness: There is no guarding.   Musculoskeletal:         General: No signs of injury.   Skin:     General: Skin is dry.   Neurological:      General: No focal deficit present.      Mental Status: She is alert and oriented to person, place, and time.         I have seen the patient either independently or with an associated resident physician or advanced practice provider     Overnight Events: No acute events overnight, patient was able to sleep without difficulty.      Physical Exam  Constitutional:       General: She is not in acute distress.  HENT:      Mouth/Throat:      Mouth: Mucous membranes are moist.      Comments: No plaque or ulceration observed at oral mucosa  Eyes:      Pupils: Pupils are equal, round, and reactive to light.   Cardiovascular:      Rate and Rhythm: Normal rate and regular rhythm.   Pulmonary:      Effort: No respiratory distress.      Comments: On 40L 90% FiO2.  Abdominal:      Palpations: Abdomen is soft.      Tenderness: There is no abdominal tenderness.   Musculoskeletal:         General: Normal range of motion.      Cervical back: Normal range of motion.   Skin:     General: Skin is warm and dry.   Neurological:      General: No focal deficit present.      Mental Status: She is alert and oriented to person, place, and time.      Comments: CAM negative         Neuro: No acute issues. Delirium  precautions, maintain sleep/wake cycle  Cardiac: Hx of HTN, HLD. Continue metoprolol 25 mg BID, febuxostat. Continue home triamterene-hydrochlorothiazide.  Pulmonary: Acute respiratory failure with hypoxia, covid pneumonia, possible bacterial CAP. HFNC today at 35L 75% FiO2, wean to maintain SpO2 at 92%. Will plan to maintain flow, and prioritize weaning FiO2. Albuterol every 4h prn for wheezing. Breo ellipta held due to candidiasis.  Gastrointestinal: No acute issues. Bowel regimen as needed.  Renal: Hypokalemia resolved.   Endocrine: Hx of hypothyroidism. SSI. Home synthroid continued.  Hematology: No acute issues  Infectious Disease: Covid pneumonia, possible bacterial community acquired pneumonia. Oropharyngeal candidiasis resolved. Rocephin, azithromycin will complete today. Decadron 6 mg daily for 10 days continued. Remdesevir complete. Nystatin swish and swallow complete today.  Musculoskeletal: No acute issues. Continued home uloric, no signs of gout flare at this time.     Lines/Tubes/Drains: PIV        Prophylaxis: Lovenox  Med to Discontinue: None     Disposition: ICU     ABCDEF Checklist  Analgesia: Reviewed  Breathing: Not intubated. On HFNC.  Choice of analgesia/sedation: Analgesic and sedative agents adjusted per clinical context.   Delirium assessed by CAM, will avoid exacerbating factors   Early mobility and exercise: Physical and occupational therapy engaged   Family: Plan of care, overall trajectory of patient shared with family. Questions elicited and answered as appropriate.        Due to the high probability of life threatening clinical decompensation, the patient required critical care time evaluating and managing this patient.  Critical care time included obtaining a history, examining the patient, ordering and reviewing studies, discussing, developing, and implementing a management plan, evaluating the patient's response to treatment, and discussion with other care team providers. I saw and  evaluated the patient myself.  Critical care time was performed exclusive of billable procedures.    Critical care time: 35 minutes

## 2024-07-26 LAB
ALBUMIN SERPL-MCNC: 2.7 G/DL (ref 3.5–5)
ANION GAP SERPL CALC-SCNC: 11 MMOL/L
BASOPHILS # BLD AUTO: 0.09 X10*3/UL (ref 0–0.1)
BASOPHILS NFR BLD AUTO: 0.5 %
BUN SERPL-MCNC: 24 MG/DL (ref 8–25)
CALCIUM SERPL-MCNC: 9.4 MG/DL (ref 8.5–10.4)
CHLORIDE SERPL-SCNC: 100 MMOL/L (ref 97–107)
CO2 SERPL-SCNC: 27 MMOL/L (ref 24–31)
CREAT SERPL-MCNC: 0.8 MG/DL (ref 0.4–1.6)
EGFRCR SERPLBLD CKD-EPI 2021: 76 ML/MIN/1.73M*2
EOSINOPHIL # BLD AUTO: 0.05 X10*3/UL (ref 0–0.4)
EOSINOPHIL NFR BLD AUTO: 0.3 %
ERYTHROCYTE [DISTWIDTH] IN BLOOD BY AUTOMATED COUNT: 13.8 % (ref 11.5–14.5)
GLUCOSE BLD MANUAL STRIP-MCNC: 114 MG/DL (ref 74–99)
GLUCOSE BLD MANUAL STRIP-MCNC: 216 MG/DL (ref 74–99)
GLUCOSE SERPL-MCNC: 104 MG/DL (ref 65–99)
HCT VFR BLD AUTO: 40.8 % (ref 36–46)
HGB BLD-MCNC: 13.6 G/DL (ref 12–16)
IMM GRANULOCYTES # BLD AUTO: 0.68 X10*3/UL (ref 0–0.5)
IMM GRANULOCYTES NFR BLD AUTO: 4.1 % (ref 0–0.9)
LYMPHOCYTES # BLD AUTO: 2.15 X10*3/UL (ref 0.8–3)
LYMPHOCYTES NFR BLD AUTO: 13 %
MAGNESIUM SERPL-MCNC: 1.8 MG/DL (ref 1.6–3.1)
MCH RBC QN AUTO: 28.4 PG (ref 26–34)
MCHC RBC AUTO-ENTMCNC: 33.3 G/DL (ref 32–36)
MCV RBC AUTO: 85 FL (ref 80–100)
MONOCYTES # BLD AUTO: 0.85 X10*3/UL (ref 0.05–0.8)
MONOCYTES NFR BLD AUTO: 5.2 %
NEUTROPHILS # BLD AUTO: 12.67 X10*3/UL (ref 1.6–5.5)
NEUTROPHILS NFR BLD AUTO: 76.9 %
NRBC BLD-RTO: 0 /100 WBCS (ref 0–0)
PHOSPHATE SERPL-MCNC: 3.8 MG/DL (ref 2.5–4.5)
PLATELET # BLD AUTO: 418 X10*3/UL (ref 150–450)
POTASSIUM SERPL-SCNC: 3.6 MMOL/L (ref 3.4–5.1)
RBC # BLD AUTO: 4.79 X10*6/UL (ref 4–5.2)
SODIUM SERPL-SCNC: 138 MMOL/L (ref 133–145)
WBC # BLD AUTO: 16.5 X10*3/UL (ref 4.4–11.3)

## 2024-07-26 PROCEDURE — 2500000005 HC RX 250 GENERAL PHARMACY W/O HCPCS: Performed by: STUDENT IN AN ORGANIZED HEALTH CARE EDUCATION/TRAINING PROGRAM

## 2024-07-26 PROCEDURE — 82947 ASSAY GLUCOSE BLOOD QUANT: CPT

## 2024-07-26 PROCEDURE — 2060000001 HC INTERMEDIATE ICU ROOM DAILY

## 2024-07-26 PROCEDURE — 2500000001 HC RX 250 WO HCPCS SELF ADMINISTERED DRUGS (ALT 637 FOR MEDICARE OP)

## 2024-07-26 PROCEDURE — 2500000002 HC RX 250 W HCPCS SELF ADMINISTERED DRUGS (ALT 637 FOR MEDICARE OP, ALT 636 FOR OP/ED)

## 2024-07-26 PROCEDURE — 80069 RENAL FUNCTION PANEL: CPT

## 2024-07-26 PROCEDURE — 94660 CPAP INITIATION&MGMT: CPT

## 2024-07-26 PROCEDURE — 2500000001 HC RX 250 WO HCPCS SELF ADMINISTERED DRUGS (ALT 637 FOR MEDICARE OP): Performed by: PHYSICIAN ASSISTANT

## 2024-07-26 PROCEDURE — 36415 COLL VENOUS BLD VENIPUNCTURE: CPT

## 2024-07-26 PROCEDURE — 9420000001 HC RT PATIENT EDUCATION 5 MIN

## 2024-07-26 PROCEDURE — 97166 OT EVAL MOD COMPLEX 45 MIN: CPT | Mod: GO

## 2024-07-26 PROCEDURE — 83735 ASSAY OF MAGNESIUM: CPT

## 2024-07-26 PROCEDURE — 85025 COMPLETE CBC W/AUTO DIFF WBC: CPT

## 2024-07-26 PROCEDURE — 99291 CRITICAL CARE FIRST HOUR: CPT

## 2024-07-26 PROCEDURE — 2500000001 HC RX 250 WO HCPCS SELF ADMINISTERED DRUGS (ALT 637 FOR MEDICARE OP): Performed by: NURSE PRACTITIONER

## 2024-07-26 PROCEDURE — 2500000001 HC RX 250 WO HCPCS SELF ADMINISTERED DRUGS (ALT 637 FOR MEDICARE OP): Performed by: STUDENT IN AN ORGANIZED HEALTH CARE EDUCATION/TRAINING PROGRAM

## 2024-07-26 PROCEDURE — 94799 UNLISTED PULMONARY SVC/PX: CPT

## 2024-07-26 PROCEDURE — 2500000004 HC RX 250 GENERAL PHARMACY W/ HCPCS (ALT 636 FOR OP/ED): Performed by: PHYSICIAN ASSISTANT

## 2024-07-26 PROCEDURE — 2500000005 HC RX 250 GENERAL PHARMACY W/O HCPCS

## 2024-07-26 PROCEDURE — 97162 PT EVAL MOD COMPLEX 30 MIN: CPT | Mod: GP

## 2024-07-26 RX ADMIN — Medication 60 PERCENT: at 11:00

## 2024-07-26 RX ADMIN — GUAIFENESIN 200 MG: 200 SOLUTION ORAL at 13:21

## 2024-07-26 RX ADMIN — LEVOTHYROXINE SODIUM 25 MCG: 0.03 TABLET ORAL at 05:07

## 2024-07-26 RX ADMIN — SIMVASTATIN 40 MG: 40 TABLET, FILM COATED ORAL at 21:00

## 2024-07-26 RX ADMIN — Medication 30 L/MIN: at 07:00

## 2024-07-26 RX ADMIN — METOPROLOL TARTRATE 25 MG: 25 TABLET, FILM COATED ORAL at 21:00

## 2024-07-26 RX ADMIN — ASPIRIN 81 MG 81 MG: 81 TABLET ORAL at 09:09

## 2024-07-26 RX ADMIN — Medication 60 PERCENT: at 23:00

## 2024-07-26 RX ADMIN — FEBUXOSTAT 80 MG: 80 TABLET ORAL at 08:56

## 2024-07-26 RX ADMIN — Medication 60 PERCENT: at 15:00

## 2024-07-26 RX ADMIN — TRIAMTERENE AND HYDROCHLOROTHIAZIDE 1 TABLET: 37.5; 25 TABLET ORAL at 08:56

## 2024-07-26 RX ADMIN — DEXAMETHASONE SODIUM PHOSPHATE 6 MG: 10 INJECTION INTRAMUSCULAR; INTRAVENOUS at 08:56

## 2024-07-26 RX ADMIN — GUAIFENESIN 200 MG: 200 SOLUTION ORAL at 05:14

## 2024-07-26 RX ADMIN — SENNOSIDES AND DOCUSATE SODIUM 1 TABLET: 50; 8.6 TABLET ORAL at 21:00

## 2024-07-26 RX ADMIN — Medication 60 PERCENT: at 19:00

## 2024-07-26 RX ADMIN — Medication 70 PERCENT: at 03:00

## 2024-07-26 RX ADMIN — ENOXAPARIN SODIUM 40 MG: 40 INJECTION SUBCUTANEOUS at 08:56

## 2024-07-26 RX ADMIN — BENZOCAINE 6 MG-MENTHOL 10 MG LOZENGES 1 LOZENGE: at 17:45

## 2024-07-26 RX ADMIN — BENZOCAINE 6 MG-MENTHOL 10 MG LOZENGES 1 LOZENGE: at 21:24

## 2024-07-26 ASSESSMENT — PAIN SCALES - GENERAL
PAINLEVEL_OUTOF10: 0 - NO PAIN

## 2024-07-26 ASSESSMENT — ACTIVITIES OF DAILY LIVING (ADL)
BATHING_ASSISTANCE: MINIMAL
ADL_ASSISTANCE: INDEPENDENT
ADL_ASSISTANCE: INDEPENDENT

## 2024-07-26 ASSESSMENT — COGNITIVE AND FUNCTIONAL STATUS - GENERAL
CLIMB 3 TO 5 STEPS WITH RAILING: A LITTLE
DRESSING REGULAR LOWER BODY CLOTHING: A LITTLE
MOVING TO AND FROM BED TO CHAIR: A LITTLE
HELP NEEDED FOR BATHING: A LITTLE
TURNING FROM BACK TO SIDE WHILE IN FLAT BAD: A LITTLE
DAILY ACTIVITIY SCORE: 19
STANDING UP FROM CHAIR USING ARMS: A LITTLE
WALKING IN HOSPITAL ROOM: A LITTLE
DRESSING REGULAR UPPER BODY CLOTHING: A LITTLE
PERSONAL GROOMING: A LITTLE
TOILETING: A LITTLE
MOBILITY SCORE: 19

## 2024-07-26 ASSESSMENT — PAIN - FUNCTIONAL ASSESSMENT
PAIN_FUNCTIONAL_ASSESSMENT: 0-10
PAIN_FUNCTIONAL_ASSESSMENT: 0-10

## 2024-07-26 ASSESSMENT — PAIN SCALES - WONG BAKER: WONGBAKER_NUMERICALRESPONSE: NO HURT

## 2024-07-26 NOTE — PROGRESS NOTES
[facility] Critical Care Medicine       Date:  7/26/2024  Patient:  Irma Baumann  YOB: 1945  MRN:  [id number]   Admit Date:  7/20/2024    Chief Complaint   Patient presents with    Shortness of Breath     10 days ago started having a cough. Hx. Asthma. Now SOB and hypoxic at . Inhaler not helping much.    Cough         History of Present Illness:  Irma Baumann is a 79 y.o. year old female patient  who presented to ED with 10 day history of increased work of breathing, ESPINAL, and cough. Patient states that she just returned on 7/18 from a 2 week vacation in [place]. During this time, one week was spent in [place], and one week was spent on a cruise ship sailing around [place]. Upon returning home, she had complaints as identified above, however they did not resolve and continued to worsen so she sought medical care in ED. During workup in ED, she was found to to be Covid+ with CXR and CTA-C findings consistent with bilateral lower lobe PNA. She was initially responsive to NC oxygen, however her oxygen demands rapidly escalated to needing HFNC at 40L and FiO2 100% in order to maintain SpO2 >92%. While she was initially admitted to to the floor under medicine, with increasing oxygen requirements she was transitioned to ICU. She was incidentally found to be hypokalemic which was repleted by ED.     Patient has a history of  HTN, Asthma, Gout, Hypothyroidism       Interval ICU Events:  7/23: overnight pt became bradycardic with HR in 40s-50s after metoprolol given, did occur 2 nights prior as well. Dose decreased today, will monitor HR overnight. Remains on HFNC 40L/80%, up in chair most of the day, work of breathing appears improved.     7/26: HFNC able to be weaned overnight to 40L/60%. Worked with PT and OT and did well with ambulation. Stable for downgrade to stepdown.     Medical History:  Past Medical History:   Diagnosis Date    Personal history of other diseases of the circulatory system      History of hypertension    Personal history of other diseases of the respiratory system     History of asthma     Past Surgical History:   Procedure Laterality Date    OTHER SURGICAL HISTORY  11/30/2022    Hysterectomy    OTHER SURGICAL HISTORY  11/30/2022    Sinus surgery     Medications Prior to Admission   Medication Sig Dispense Refill Last Dose    albuterol 108 (90 Base) MCG/ACT inhaler Inhale 2 puffs every 6 hours if needed.       albuterol 2.5 mg /3 mL (0.083 %) nebulizer solution every 6 hours.  3 ml as needed Inhalation       budesonide-formoteroL (Symbicort) 160-4.5 mcg/actuation inhaler Inhale 2 puffs 2 times a day.       cholecalciferol, vitamin D3, (VITAMIN D3 ORAL) Take 1 capsule by mouth once daily.       colchicine, gout, (Colcrys) 0.6 mg tablet Take 1 tablet (0.6 mg) by mouth every other day. Gout       econazole nitrate 1 % cream APPLY TO AFFECTED AREAS UNDER BREASTS TWICE PER DAY AS NEEDED FOR FLARES       febuxostat (Uloric) 80 mg tablet Take 1 tablet (80 mg) by mouth once daily. 90 tablet 3     ibuprofen (AdviL) 200 mg tablet Take 3 tablets (600 mg) by mouth every 6 hours if needed (pain).       ipratropium (Atrovent) 42 mcg (0.06 %) nasal spray 1 spray 3 times a day.       levothyroxine (Synthroid, Levoxyl) 25 mcg tablet TAKE 1 TABLET BY MOUTH IN THE  MORNING ON AN EMPTY STOMACH 90 tablet 3     metoprolol tartrate (Lopressor) 50 mg tablet Take 1 tablet by mouth 2 times a day.       potassium chloride CR (Klor-Con M20) 20 mEq ER tablet Take 1 tablet (20 mEq) by mouth once daily. With food 90 tablet 3     simvastatin (Zocor) 40 mg tablet TAKE 1 TABLET BY MOUTH ONCE  DAILY IN THE EVENING 90 tablet 3     triamcinolone (Kenalog) 0.5 % cream Apply topically 2 times a day. 15 g 0     triamterene-hydrochlorothiazid (Maxzide) 75-50 mg tablet Take 0.5 tablets by mouth once daily. 45 tablet 3      Levofloxacin, Allopurinol, Cephalexin, Ciprofloxacin, Sulfamethoxazole-trimethoprim, Colchicine, and  Penicillins  Social History     Tobacco Use    Smoking status: Never     Passive exposure: Never    Smokeless tobacco: Never   Vaping Use    Vaping status: Never Used   Substance Use Topics    Alcohol use: Yes     Comment: sometimes    Drug use: Never     Family History   Problem Relation Name Age of Onset    Hypertension Mother      Other (cva) Mother      Diabetes Mother      Stroke Mother      Hypertension Father      Other (cva) Father      Stroke Father      Diabetes Sister      Heart disease Brother      No Known Problems Son      No Known Problems Son         Hospital Medications:           Current Facility-Administered Medications:     acetaminophen (Tylenol) tablet 650 mg, 650 mg, oral, q6h PRN, Lawrence Crowder PA-C    albuterol 2.5 mg /3 mL (0.083 %) nebulizer solution 2.5 mg, 2.5 mg, nebulization, q4h PRN, Lawrence Crowder PA-C    aspirin chewable tablet 81 mg, 81 mg, oral, Daily, ASHLEY Fish-CNP, 81 mg at 07/25/24 0820    benzocaine-menthol (Cepastat Sore Throat) lozenge 1 lozenge, 1 lozenge, Mouth/Throat, q2h PRN, Rahul Thompson PA-C, 1 lozenge at 07/25/24 2024    dexAMETHasone (Decadron) injection 6 mg, 6 mg, intravenous, q AM, Lawrence Crowder PA-C, 6 mg at 07/25/24 0820    dextrose 50 % injection 12.5 g, 12.5 g, intravenous, q15 min PRN, Lawrence Crowder PA-C    dextrose 50 % injection 25 g, 25 g, intravenous, q15 min PRN, Lawrence Crowder PA-C    enoxaparin (Lovenox) syringe 40 mg, 40 mg, subcutaneous, q24h, Lawrence Crowder PA-C, 40 mg at 07/25/24 0820    febuxostat (Uloric) tablet 80 mg, 80 mg, oral, Once per day on Monday Wednesday Friday, Marbin Moody DO, 80 mg at 07/24/24 0802    glucagon (Glucagen) injection 1 mg, 1 mg, intramuscular, q15 min PRN, Lawrence Crowder PA-C    glucagon (Glucagen) injection 1 mg, 1 mg, intramuscular, q15 min PRN, Lawrence W Lakesha, PA-C    guaiFENesin (Robitussin) 100 mg/5 mL syrup 200 mg, 200 mg, oral, q4h PRN, ASHLEY Mcgregor-CNP, 200 mg  at 07/26/24 0514    insulin regular (HumuLIN R,NovoLIN R) injection 0-5 Units, 0-5 Units, subcutaneous, With meals & nightly, Daniel Desai PA-C, 2 Units at 07/25/24 2014    levothyroxine (Synthroid, Levoxyl) tablet 25 mcg, 25 mcg, oral, Daily before breakfast, Lawrence Crowder PA-C, 25 mcg at 07/26/24 0507    loperamide (Imodium) capsule 2 mg, 2 mg, oral, 4x daily PRN, Tarek R Gharibeh, MD    melatonin tablet 3 mg, 3 mg, oral, Nightly PRN, Lawrence Crowder PA-C, 3 mg at 07/23/24 2306    metoprolol tartrate (Lopressor) tablet 25 mg, 25 mg, oral, BID, Jael Seo, ASHLEY-CNP, 25 mg at 07/25/24 0820    oxygen (O2) therapy, , inhalation, Continuous PRN - O2/gases, Lawrence Crowder PA-C, 40 L/min at 07/21/24 2053    oxygen (O2) therapy, , inhalation, q4h, Marbin Moody, DO, 70 percent at 07/26/24 0300    polyethylene glycol (Glycolax, Miralax) packet 17 g, 17 g, oral, Daily PRN, Lawrence Crowder PA-C    sennosides-docusate sodium (Silvia-Colace) 8.6-50 mg per tablet 1 tablet, 1 tablet, oral, Nightly, Lawrence Crowder PA-C, 1 tablet at 07/25/24 2017    simvastatin (Zocor) tablet 40 mg, 40 mg, oral, q PM, Lawrence Crowder PA-C, 40 mg at 07/25/24 2016    triamterene-hydrochlorothiazid (Maxzide-25) 37.5-25 mg per tablet 1 tablet, 1 tablet, oral, Daily, Lawrence Crowder PA-C, 1 tablet at 07/25/24 0820    Review of Systems:  14 point review of systems was completed and negative except for those specially mention in my HPI    Physical Exam:    Heart Rate:  [47-72]   Temp:  [35.9 °C (96.7 °F)-36.3 °C (97.4 °F)]   Resp:  [14-27]   BP: ()/()   Weight:  [76.6 kg (168 lb 14 oz)-78.3 kg (172 lb 9.9 oz)]   SpO2:  [89 %-99 %]     Physical Exam  Constitutional:       General: She is not in acute distress.     Appearance: She is not diaphoretic.      Interventions: Nasal cannula in place.   HENT:      Head: Normocephalic and atraumatic.   Eyes:      Extraocular Movements: Extraocular movements intact.       Conjunctiva/sclera: Conjunctivae normal.      Pupils: Pupils are equal, round, and reactive to light.   Cardiovascular:      Rate and Rhythm: Normal rate and regular rhythm.   Pulmonary:      Effort: Tachypnea present. No respiratory distress.      Breath sounds: Decreased air movement present. Examination of the right-upper field reveals wheezing. Examination of the left-upper field reveals wheezing. Wheezing present.   Abdominal:      General: Bowel sounds are normal. There is no distension.      Palpations: Abdomen is soft.      Tenderness: There is no abdominal tenderness.   Musculoskeletal:      Right lower leg: No edema.      Left lower leg: No edema.   Skin:     General: Skin is warm.      Capillary Refill: Capillary refill takes less than 2 seconds.   Neurological:      General: No focal deficit present.      Mental Status: She is alert and oriented to person, place, and time.         Objective:    I have reviewed all medications, laboratory results, and imaging pertinent for today's encounter.    FiO2 (%):  [60 %-80 %] 60 %      Intake/Output Summary (Last 24 hours) at 7/26/2024 0719  Last data filed at 7/26/2024 0500  Gross per 24 hour   Intake 690 ml   Output --   Net 690 ml         Assessment/Plan:    I am currently managing this critically ill patient for the following problems:    Neuro/Psych/Pain Ctrl/Sedation: pt is AOx  No active concerns   - Pain Control: tylenol PRN   - Melatonin PRN nightly to aid in sleep   - CAM ICU qshift, sleep-wake hygiene, delirium precautions     Respiratory/ENT:  Acute hypoxic respiratory failure   COVID PNA   Concerns for possible superimposed bacterial PNA   - Supplemental O2: HFNC 40L/80%  - Wean FiO2 as tolerated to maintain SpO2 >92%  - Continue decadron x10 days  - Continue breo ellipta   - See empiric abx below   - Continuous pulse ox monitoring   - Pulm hygiene    Cardiovascular: hemodynamically stable/unstable  Sinus bradycardia - while asleep, likely vagal  response as HR normalizes when she awakens   Hx HTN  - Continue home metoprolol, maxzide, simvastatin, aspirin  - Maintain MAPs >65  - Continuous cardiac monitoring per ICU protocol  - EKGs PRN for ACS symptoms, arrhythmias     GI:  No active concerns   - Diet: cardiac  - BR: miralax PRN     Renal/Volume Status/Electrolytes:  No active concerns   - Maintain urine output 0.5-1.0cc/kg/hr  - Hourly I/O's  - Replete electrolytes to maintain K >4.0 and Mg >2.0  - Daily BMP, Mg, Phos    Endocrine:  Hx hypothyroidism   - SSI ACHS, can deescalate if glucose remains stable without insulin usage in the setting of steroid administration   - Continue home synthroid   - Hypoglycemia protocol PRN     Infectious Disease:  Oral candidiasis - resolved   COVID PNA   Concerns for bacterial CAP  - Antibiotics: rocephin and azithromycin x5 days, remdesivir, nystatin - doses completed   - Monitor SIRS criteria    Heme/Onc:  No active concerns   - Monitor for s/sx of anemia such as bleeding and bruising   - Transfuse if Hgb <7.0   - Daily CBC    MSK:  Physical deconditioning in the setting of respiratory distress   - Pt has been sitting up in chair x multiple hours the last few days and does report moderate fatigue that is improving   - PT/OT added today     Derm:  - ICU skin protocol  - Pt able to turn herself   - Padded pressure points     Ethics/Code Status:  DNR-CCA, ok with intubation     :  DVT Prophylaxis: lovenox  GI Prophylaxis: none  Bowel Regimen: miralax PRN  Diet: cardiac  CVC: none  Shira: none  Gaines: none  Restraints: none  Disposition: downgrade     Critical Care Time:  45 minutes spent in preparing to see patient (I.e. labs, imaging, etc.), documentation, discussion plan of care with patient/family/caregiver, and/or coordination of care with multidisciplinary team including the attending. Time does not include completion of procedure time.     Plan discussed with Dr. Moody.     Daniel Desai,  MAXWELL  Pulmonary & Critical Care Medicine   [facility]

## 2024-07-26 NOTE — PROGRESS NOTES
Occupational Therapy    Evaluation  Patient Name: Irma Baumann  MRN: [id number]  : 1945  Today's Date: 24  Time Calculation  Start Time: 1316  Stop Time: 1333  Time Calculation (min): 17 min       Assessment:  OT Assessment: Pt would benefit from acute OT services to address deficits in ADLs/IADLs, transfers, and functional mobility  End of Session Communication: Bedside nurse  End of Session Patient Position: Up in chair, Alarm off, not on at start of session (all needs in reach)  OT Assessment Results: Decreased ADL status, Decreased upper extremity strength, Decreased endurance, Decreased functional mobility, Decreased IADLs  Strengths: Ability to acquire knowledge, Premorbid level of function, Support and attitude of living partners  Plan:  Treatment Interventions: ADL retraining, Functional transfer training, UE strengthening/ROM, Endurance training, Equipment evaluation/education, Patient/family training  OT Frequency: 5 times per week  OT Discharge Recommendations: Low intensity level of continued care (recommend intermittent assist upon d/c)  OT - OK to Discharge: Yes  Treatment Interventions: ADL retraining, Functional transfer training, UE strengthening/ROM, Endurance training, Equipment evaluation/education, Patient/family training    Subjective   Current Problem:  1. COVID-19        2. Pneumonia due to infectious organism, unspecified laterality, unspecified part of lung        3. Acute respiratory failure with hypoxia (Multi)          General:   OT Received On: 24  General  Reason for Referral: Pt admitted from home with c/o SOB and cough for 10 days. Found to be Covid+  Referred By: Daniel Desai PA-C  Past Medical History Relevant to Rehab: Gout, asthma, HLD, hypothyroidism, uterovaginal prolapse, OA, vit D deficiency  Family/Caregiver Present: No  Co-Treatment: PT  Prior to Session Communication: Bedside nurse  Patient Position Received: Up in chair, Alarm off, not on at  start of session  General Comment: Cleared by nursing. Pt pleasant and agreeable to therapy  Precautions:  Hearing/Visual Limitations: glasses, [place]  Medical Precautions: Fall precautions, Oxygen therapy device and L/min, Infection precautions (HFNC 40L/min FiO2 60%; Covid+)  Vital Signs:  Heart Rate: 72  Heart Rate Source: Monitor  SpO2: 92 %  Pain:  Pain Assessment  Pain Assessment: 0-10  0-10 (Numeric) Pain Score: 0 - No pain    Objective   Cognition:  Overall Cognitive Status: Within Functional Limits  Orientation Level: Oriented X4           Home Living:  Type of Home: House  Lives With: Spouse  Home Adaptive Equipment: None  Home Layout: One level  Home Access: Level entry  Bathroom Shower/Tub: Walk-in shower  Bathroom Toilet: Standard  Bathroom Equipment: None  Prior Function:  Level of [place]: Independent with ADLs and functional transfers, Independent with homemaking with ambulation  Receives Help From: Family  ADL Assistance: Independent  Homemaking Assistance: Independent  Ambulatory Assistance: Independent  Vocational: Retired  Hand Dominance: Right  Prior Function Comments: Pt reports indep PTA, denies h/o falls       ADL:  Eating Assistance: Independent  Grooming Assistance: Stand by  Bathing Assistance: Minimal  UE Dressing Assistance: Stand by  LE Dressing Assistance: Minimal  Toileting Assistance with Device: Minimal    Activity Tolerance:  Endurance: Decreased tolerance for upright activites     Bed Mobility/Transfers: Bed Mobility  Bed Mobility: No    Transfers  Transfer:  (close supervision for safety for sit<>stand chair level)    Functional Mobility:  Functional Mobility  Functional Mobility Performed:  (close supervision for safety for functional mobility short household distance without use of AD, intermittent use of bed rail noted, pt mobility limited by HFNC.)     Standing Balance:  Static Standing Balance  Static Standing-Level of Assistance: Close supervision     Vision:Vision -  Basic Assessment  Current Vision: Wears glasses all the time  Sensation:  Sensation Comment: pt denied numbness/paresthesia BUEs  Strength:  Strength Comments: WFL    Hand Function:  Hand Function  Gross Grasp: Functional  Coordination: Functional  Extremities: RUE   RUE : Within Functional Limits and LUE   LUE: Within Functional Limits    Outcome Measures: [facility] Daily Activity  Putting on and taking off regular lower body clothing: A little  Bathing (including washing, rinsing, drying): A little  Putting on and taking off regular upper body clothing: A little  Toileting, which includes using toilet, bedpan or urinal: A little  Taking care of personal grooming such as brushing teeth: A little  Eating Meals: None  Daily Activity - Total Score: 19    Education Documentation  ADL Training, taught by Mayra Brown OT at 7/26/2024  3:05 PM.  Learner: Patient  Readiness: Acceptance  Method: Explanation, Demonstration  Response: Verbalizes Understanding    Education Comments  No comments found.      Goals:  Encounter Problems       Encounter Problems (Active)       ADLs       Pt will complete ADL tasks at mod I with use of AE prn  (Progressing)       Start:  07/26/24    Expected End:  08/16/24               Functional Mobility       Pt will perform functional mobility household distances at mod I with use of LRAD.    (Progressing)       Start:  07/26/24    Expected End:  08/16/24               Instrumental Activities of Daily Living       Pt will perform simple IADLs/retrieval of items at mod I.   (Progressing)       Start:  07/26/24    Expected End:  08/16/24               OT Transfers       Pt will perform functional transfers at mod I.   (Progressing)       Start:  07/26/24    Expected End:  08/16/24

## 2024-07-26 NOTE — CARE PLAN
Problem: Respiratory  Goal: No signs of respiratory distress (eg. Use of accessory muscles. Peds grunting)  Outcome: Progressing  Goal: Verbalize decreased shortness of breath this shift  Outcome: Progressing  Goal: Wean oxygen to maintain O2 saturation per order/standard this shift  Outcome: Progressing     Problem: Skin  Goal: Decreased wound size/increased tissue granulation at next dressing change  Outcome: Progressing  Flowsheets (Taken 7/25/2024 2007)  Decreased wound size/increased tissue granulation at next dressing change:   Promote sleep for wound healing   Protective dressings over bony prominences   Utilize specialty bed per algorithm  Goal: Participates in plan/prevention/treatment measures  Outcome: Progressing  Flowsheets (Taken 7/25/2024 2007)  Participates in plan/prevention/treatment measures:   Discuss with provider PT/OT consult   Increase activity/out of bed for meals   Elevate heels  Goal: Prevent/manage excess moisture  Outcome: Progressing  Flowsheets (Taken 7/25/2024 2007)  Prevent/manage excess moisture:   Cleanse incontinence/protect with barrier cream   Monitor for/manage infection if present   Use wicking fabric (obtain order)   Follow provider orders for dressing changes   Moisturize dry skin  Goal: Prevent/minimize sheer/friction injuries  Outcome: Progressing  Flowsheets (Taken 7/25/2024 2007)  Prevent/minimize sheer/friction injuries:   Complete micro-shifts as needed if patient unable. Adjust patient position to relieve pressure points, not a full turn   Increase activity/out of bed for meals   Use pull sheet   HOB 30 degrees or less   Turn/reposition every 2 hours/use positioning/transfer devices   Utilize specialty bed per algorithm  Goal: Promote/optimize nutrition  Outcome: Progressing  Flowsheets (Taken 7/25/2024 2007)  Promote/optimize nutrition:   Monitor/record intake including meals   Offer water/supplements/favorite foods  Goal: Promote skin healing  Outcome:  Progressing  Flowsheets (Taken 7/25/2024 2007)  Promote skin healing:   Assess skin/pad under line(s)/device(s)   Protective dressings over bony prominences   Turn/reposition every 2 hours/use positioning/transfer devices   Ensure correct size (line/device) and apply per  instructions   Rotate device position/do not position patient on device     Problem: Pain  Goal: Takes deep breaths with improved pain control throughout the shift  Outcome: Progressing  Goal: Turns in bed with improved pain control throughout the shift  Outcome: Progressing  Goal: Walks with improved pain control throughout the shift  Outcome: Progressing  Goal: Performs ADL's with improved pain control throughout shift  Outcome: Progressing  Goal: Participates in PT with improved pain control throughout the shift  Outcome: Progressing  Goal: Free from opioid side effects throughout the shift  Outcome: Progressing  Goal: Free from acute confusion related to pain meds throughout the shift  Outcome: Progressing     Problem: Pain - Adult  Goal: Verbalizes/displays adequate comfort level or baseline comfort level  Outcome: Progressing  Flowsheets (Taken 7/25/2024 2007)  Verbalizes/displays adequate comfort level or baseline comfort level:   Encourage patient to monitor pain and request assistance   Assess pain using appropriate pain scale   Implement non-pharmacological measures as appropriate and evaluate response   Administer analgesics based on type and severity of pain and evaluate response   Consider cultural and social influences on pain and pain management   Notify Licensed Independent Practitioner if interventions unsuccessful or patient reports new pain     Problem: Safety - Adult  Goal: Free from fall injury  Outcome: Progressing  Flowsheets (Taken 7/25/2024 2007)  Free from fall injury:   Instruct family/caregiver on patient safety   Based on caregiver fall risk screen, instruct family/caregiver to ask for assistance with  transferring infant if caregiver noted to have fall risk factors     Problem: Discharge Planning  Goal: Discharge to home or other facility with appropriate resources  Outcome: Progressing  Flowsheets (Taken 7/25/2024 2007)  Discharge to home or other facility with appropriate resources:   Identify barriers to discharge with patient and caregiver   Arrange for needed discharge resources and transportation as appropriate   Identify discharge learning needs (meds, wound care, etc)   Arrange for interpreters to assist at discharge as needed   Refer to discharge planning if patient needs post-hospital services based on physician order or complex needs related to functional status, cognitive ability or social support system     Problem: Chronic Conditions and Co-morbidities  Goal: Patient's chronic conditions and co-morbidity symptoms are monitored and maintained or improved  Outcome: Progressing  Flowsheets (Taken 7/25/2024 2007)  Care Plan - Patient's Chronic Conditions and Co-Morbidity Symptoms are Monitored and Maintained or Improved:   Monitor and assess patient's chronic conditions and comorbid symptoms for stability, deterioration, or improvement   Collaborate with multidisciplinary team to address chronic and comorbid conditions and prevent exacerbation or deterioration   Update acute care plan with appropriate goals if chronic or comorbid symptoms are exacerbated and prevent overall improvement and discharge   The patient's goals for the shift include      The clinical goals for the shift include hemodynamically stable    Over the shift, the patient did not make progress toward the following goals. Barriers to progression include . Recommendations to address these barriers include .

## 2024-07-26 NOTE — PROGRESS NOTES
Patient not medically clear. Patient on 60% high flow oxygen. Therapy ordered to evaluate patient. At this time the discharge plan is to return home. Will follow.      07/26/24 1267   Discharge Planning   Home or Post Acute Services None   Expected Discharge Disposition Home   Does the patient need discharge transport arranged? No

## 2024-07-26 NOTE — PROGRESS NOTES
Physical Therapy    Physical Therapy Evaluation    Patient Name: Irma Baumann  MRN: [id number]  Today's Date: 7/26/2024   Time Calculation  Start Time: 1315  Stop Time: 1330  Time Calculation (min): 15 min    Assessment/Plan   PT Assessment  PT Assessment Results: Decreased strength, Decreased endurance, Impaired balance, Decreased mobility  Rehab Prognosis: Good  Evaluation/Treatment Tolerance: Patient tolerated treatment well  Medical Staff Made Aware: Yes  Strengths: Ability to acquire knowledge  End of Session Communication: Bedside nurse  Assessment Comment: 80 y/o female admit from home with COVID presents with impaired functional mobility, including decreased BLE strength, impaired balance ,decreased tolerance to activity. currently requiring supervision/CGA for safe mobility without AD. may benefit from cane.  End of Session Patient Position: Up in chair, Alarm off, not on at start of session  IP OR SWING BED PT PLAN  Inpatient or Swing Bed: Inpatient  PT Plan  Treatment/Interventions: Bed mobility, Transfer training, Gait training, Balance training, Strengthening, Endurance training, Therapeutic exercise, Therapeutic activity  PT Plan: Ongoing PT  PT Frequency: 5 times per week  PT Discharge Recommendations: Low intensity level of continued care  Equipment Recommended upon Discharge: Other (comment) (none vs cane)  PT Recommended Transfer Status: Contact guard  PT - OK to Discharge: Yes      Subjective   General Visit Information:  General  Reason for Referral: impaired mobility; 80 y/o female admit from home with complaints of SOB and cough. +COVID.  Referred By: Daniel Desai PA-C  Past Medical History Relevant to Rehab: bph, gout, asthma, hpl, hypothyroidism, uterovaginal prolapse, vit D  Family/Caregiver Present: No  Co-Treatment: OT  Co-Treatment Reason: medical complexity, optimization of patient outcomes  Prior to Session Communication: Bedside nurse  Patient Position Received: Up in chair,  Alarm off, not on at start of session  General Comment: pt cleared for therapy by nursing, seated in recliner upon arrival and agreeable to therapy.  Home Living:  Home Living  Type of Home: House  Lives With: Spouse  Home Adaptive Equipment: None  Home Layout: One level  Home Access: Level entry  Bathroom Shower/Tub: Walk-in shower  Bathroom Toilet: Standard  Bathroom Equipment: None  Prior Level of Function:  Prior Function Per Pt/Caregiver Report  Level of [place]: Independent with ADLs and functional transfers, Independent with homemaking with ambulation  Receives Help From: Family  ADL Assistance: Independent  Homemaking Assistance: Independent  Ambulatory Assistance: Independent  Vocational: Retired  Prior Function Comments: denies fall PTA  Precautions:  Precautions  Hearing/Visual Limitations: +glasses, [place]  Medical Precautions: Fall precautions  Vital Signs:  Vital Signs  Heart Rate Source: Monitor  SpO2: 90 %    Objective   Pain:  Pain Assessment  Pain Assessment: 0-10  0-10 (Numeric) Pain Score: 0 - No pain  Cognition:  Cognition  Overall Cognitive Status: Within Functional Limits  Orientation Level: Oriented X4    General Assessments:  General Observation  General Observation: alert, NAD     Activity Tolerance  Endurance: Decreased tolerance for upright activites    Sensation  Light Touch: No apparent deficits  Sensation Comment: denies paresthesias    Strength  Strength Comments: BLEs grossly >4-/5    Coordination  Coordination Comment: decreased rate of movements    Postural Control  Postural Control: Within Functional Limits  Posture Comment: forward head and rounded shoulders    Static Sitting Balance  Static Sitting-Comment/Number of Minutes: good+  Dynamic Sitting Balance  Dynamic Sitting-Comments: good+    Static Standing Balance  Static Standing-Comment/Number of Minutes: good  Dynamic Standing Balance  Dynamic Standing-Comments: good-  Functional Assessments:  Transfers  Transfer:   (supervision sit <> stand, CGA for dynamic standing balance. good eccentric control. able to perform several standing marches with supervision and LUE support on bed rail.)    Ambulation/Gait Training  Ambulation/Gait Training Performed:  (pt amb 10'x2 without AD, reaching for furniture intermittently, mildly unsteady, decreased erickson, decreased B step length. cues for pursed lip breathing. educated on use of cane for homegoing pending progress towards goals.)  Extremity/Trunk Assessments:  RLE   RLE : Within Functional Limits  LLE   LLE : Within Functional Limits  Outcome Measures:  [facility] Basic Mobility  Turning from your back to your side while in a flat bed without using bedrails: None  Moving from lying on your back to sitting on the side of a flat bed without using bedrails: A little  Moving to and from bed to chair (including a wheelchair): A little  Standing up from a chair using your arms (e.g. wheelchair or bedside chair): A little  To walk in hospital room: A little  Climbing 3-5 steps with railing: A little  Basic Mobility - Total Score: 19    Encounter Problems       Encounter Problems (Active)       PT Problem       Patient will demonstrate improvements in strength  (Progressing)       Start:  07/26/24    Expected End:  08/23/24            Patient will transfer supine <> sit independently  (Progressing)       Start:  07/26/24    Expected End:  08/23/24            Patient will transfer sit <> stand independently (Progressing)       Start:  07/26/24    Expected End:  08/23/24            Patient will ambulate 150 ft independently   (Progressing)       Start:  07/26/24    Expected End:  08/23/24               Pain - Adult              Education Documentation  Precautions, taught by Maya Still PT at 7/26/2024  2:56 PM.  Learner: Patient  Readiness: Acceptance  Method: Explanation  Response: Verbalizes Understanding    Body Mechanics, taught by Maya Still, PT at 7/26/2024  2:56 PM.  Learner:  Patient  Readiness: Acceptance  Method: Explanation  Response: Verbalizes Understanding    Mobility Training, taught by Maya Still PT at 7/26/2024  2:56 PM.  Learner: Patient  Readiness: Acceptance  Method: Explanation  Response: Verbalizes Understanding    Education Comments  No comments found.

## 2024-07-27 ENCOUNTER — APPOINTMENT (OUTPATIENT)
Dept: RADIOLOGY | Facility: HOSPITAL | Age: 79
End: 2024-07-27
Payer: MEDICARE

## 2024-07-27 LAB
ALBUMIN SERPL-MCNC: 2.8 G/DL (ref 3.5–5)
ANION GAP SERPL CALC-SCNC: 10 MMOL/L
BASOPHILS # BLD AUTO: 0.04 X10*3/UL (ref 0–0.1)
BASOPHILS NFR BLD AUTO: 0.3 %
BUN SERPL-MCNC: 23 MG/DL (ref 8–25)
CALCIUM SERPL-MCNC: 9.1 MG/DL (ref 8.5–10.4)
CHLORIDE SERPL-SCNC: 101 MMOL/L (ref 97–107)
CO2 SERPL-SCNC: 26 MMOL/L (ref 24–31)
CREAT SERPL-MCNC: 0.7 MG/DL (ref 0.4–1.6)
EGFRCR SERPLBLD CKD-EPI 2021: 89 ML/MIN/1.73M*2
EOSINOPHIL # BLD AUTO: 0 X10*3/UL (ref 0–0.4)
EOSINOPHIL NFR BLD AUTO: 0 %
ERYTHROCYTE [DISTWIDTH] IN BLOOD BY AUTOMATED COUNT: 13.8 % (ref 11.5–14.5)
GLUCOSE BLD MANUAL STRIP-MCNC: 133 MG/DL (ref 74–99)
GLUCOSE BLD MANUAL STRIP-MCNC: 153 MG/DL (ref 74–99)
GLUCOSE BLD MANUAL STRIP-MCNC: 233 MG/DL (ref 74–99)
GLUCOSE BLD MANUAL STRIP-MCNC: 250 MG/DL (ref 74–99)
GLUCOSE SERPL-MCNC: 163 MG/DL (ref 65–99)
HCT VFR BLD AUTO: 39.8 % (ref 36–46)
HGB BLD-MCNC: 13.7 G/DL (ref 12–16)
IMM GRANULOCYTES # BLD AUTO: 0.41 X10*3/UL (ref 0–0.5)
IMM GRANULOCYTES NFR BLD AUTO: 2.7 % (ref 0–0.9)
LYMPHOCYTES # BLD AUTO: 1.02 X10*3/UL (ref 0.8–3)
LYMPHOCYTES NFR BLD AUTO: 6.7 %
MAGNESIUM SERPL-MCNC: 1.8 MG/DL (ref 1.6–3.1)
MCH RBC QN AUTO: 29 PG (ref 26–34)
MCHC RBC AUTO-ENTMCNC: 34.4 G/DL (ref 32–36)
MCV RBC AUTO: 84 FL (ref 80–100)
MONOCYTES # BLD AUTO: 0.5 X10*3/UL (ref 0.05–0.8)
MONOCYTES NFR BLD AUTO: 3.3 %
NEUTROPHILS # BLD AUTO: 13.17 X10*3/UL (ref 1.6–5.5)
NEUTROPHILS NFR BLD AUTO: 87 %
NRBC BLD-RTO: 0 /100 WBCS (ref 0–0)
PHOSPHATE SERPL-MCNC: 3.5 MG/DL (ref 2.5–4.5)
PLATELET # BLD AUTO: 439 X10*3/UL (ref 150–450)
POTASSIUM SERPL-SCNC: 3.8 MMOL/L (ref 3.4–5.1)
RBC # BLD AUTO: 4.73 X10*6/UL (ref 4–5.2)
SODIUM SERPL-SCNC: 137 MMOL/L (ref 133–145)
WBC # BLD AUTO: 15.1 X10*3/UL (ref 4.4–11.3)

## 2024-07-27 PROCEDURE — 71045 X-RAY EXAM CHEST 1 VIEW: CPT

## 2024-07-27 PROCEDURE — 2500000002 HC RX 250 W HCPCS SELF ADMINISTERED DRUGS (ALT 637 FOR MEDICARE OP, ALT 636 FOR OP/ED)

## 2024-07-27 PROCEDURE — 94660 CPAP INITIATION&MGMT: CPT

## 2024-07-27 PROCEDURE — 2500000002 HC RX 250 W HCPCS SELF ADMINISTERED DRUGS (ALT 637 FOR MEDICARE OP, ALT 636 FOR OP/ED): Performed by: HOSPITALIST

## 2024-07-27 PROCEDURE — 2500000005 HC RX 250 GENERAL PHARMACY W/O HCPCS: Performed by: HOSPITALIST

## 2024-07-27 PROCEDURE — 2500000001 HC RX 250 WO HCPCS SELF ADMINISTERED DRUGS (ALT 637 FOR MEDICARE OP)

## 2024-07-27 PROCEDURE — 9420000001 HC RT PATIENT EDUCATION 5 MIN

## 2024-07-27 PROCEDURE — 2060000001 HC INTERMEDIATE ICU ROOM DAILY

## 2024-07-27 PROCEDURE — 94640 AIRWAY INHALATION TREATMENT: CPT

## 2024-07-27 PROCEDURE — 85025 COMPLETE CBC W/AUTO DIFF WBC: CPT

## 2024-07-27 PROCEDURE — 80069 RENAL FUNCTION PANEL: CPT

## 2024-07-27 PROCEDURE — 82947 ASSAY GLUCOSE BLOOD QUANT: CPT

## 2024-07-27 PROCEDURE — 2500000005 HC RX 250 GENERAL PHARMACY W/O HCPCS

## 2024-07-27 PROCEDURE — 83735 ASSAY OF MAGNESIUM: CPT

## 2024-07-27 PROCEDURE — 2500000004 HC RX 250 GENERAL PHARMACY W/ HCPCS (ALT 636 FOR OP/ED)

## 2024-07-27 PROCEDURE — 36415 COLL VENOUS BLD VENIPUNCTURE: CPT

## 2024-07-27 RX ORDER — IPRATROPIUM BROMIDE AND ALBUTEROL SULFATE 2.5; .5 MG/3ML; MG/3ML
3 SOLUTION RESPIRATORY (INHALATION)
Status: DISCONTINUED | OUTPATIENT
Start: 2024-07-27 | End: 2024-08-01

## 2024-07-27 RX ORDER — BUDESONIDE 0.5 MG/2ML
0.25 INHALANT ORAL
Status: DISCONTINUED | OUTPATIENT
Start: 2024-07-27 | End: 2024-08-01

## 2024-07-27 RX ADMIN — METOPROLOL TARTRATE 25 MG: 25 TABLET, FILM COATED ORAL at 21:29

## 2024-07-27 RX ADMIN — Medication 50 PERCENT: at 23:00

## 2024-07-27 RX ADMIN — IPRATROPIUM BROMIDE AND ALBUTEROL SULFATE 3 ML: .5; 3 SOLUTION RESPIRATORY (INHALATION) at 12:09

## 2024-07-27 RX ADMIN — Medication 50 PERCENT: at 19:00

## 2024-07-27 RX ADMIN — BENZOCAINE 6 MG-MENTHOL 10 MG LOZENGES 1 LOZENGE: at 21:29

## 2024-07-27 RX ADMIN — Medication 40 L/MIN: at 07:36

## 2024-07-27 RX ADMIN — BENZOCAINE 6 MG-MENTHOL 10 MG LOZENGES 1 LOZENGE: at 05:42

## 2024-07-27 RX ADMIN — Medication 60 PERCENT: at 03:00

## 2024-07-27 RX ADMIN — SENNOSIDES AND DOCUSATE SODIUM 1 TABLET: 50; 8.6 TABLET ORAL at 21:30

## 2024-07-27 RX ADMIN — TRIAMTERENE AND HYDROCHLOROTHIAZIDE 1 TABLET: 37.5; 25 TABLET ORAL at 09:15

## 2024-07-27 RX ADMIN — GUAIFENESIN 200 MG: 200 SOLUTION ORAL at 21:30

## 2024-07-27 RX ADMIN — Medication 35 L/MIN: at 15:19

## 2024-07-27 RX ADMIN — GUAIFENESIN 200 MG: 200 SOLUTION ORAL at 14:45

## 2024-07-27 RX ADMIN — SIMVASTATIN 40 MG: 40 TABLET, FILM COATED ORAL at 21:30

## 2024-07-27 RX ADMIN — METOPROLOL TARTRATE 25 MG: 25 TABLET, FILM COATED ORAL at 09:15

## 2024-07-27 RX ADMIN — ASPIRIN 81 MG 81 MG: 81 TABLET ORAL at 09:15

## 2024-07-27 RX ADMIN — ENOXAPARIN SODIUM 40 MG: 40 INJECTION SUBCUTANEOUS at 09:15

## 2024-07-27 RX ADMIN — DEXAMETHASONE SODIUM PHOSPHATE 6 MG: 10 INJECTION INTRAMUSCULAR; INTRAVENOUS at 09:15

## 2024-07-27 RX ADMIN — LEVOTHYROXINE SODIUM 25 MCG: 0.03 TABLET ORAL at 05:42

## 2024-07-27 RX ADMIN — Medication 3 MG: at 21:30

## 2024-07-27 RX ADMIN — GUAIFENESIN 200 MG: 200 SOLUTION ORAL at 05:42

## 2024-07-27 RX ADMIN — IPRATROPIUM BROMIDE AND ALBUTEROL SULFATE 3 ML: .5; 3 SOLUTION RESPIRATORY (INHALATION) at 20:53

## 2024-07-27 RX ADMIN — BUDESONIDE INHALATION 0.25 MG: 0.5 SUSPENSION RESPIRATORY (INHALATION) at 12:09

## 2024-07-27 RX ADMIN — GUAIFENESIN 200 MG: 200 SOLUTION ORAL at 01:04

## 2024-07-27 RX ADMIN — Medication 40 L/MIN: at 11:56

## 2024-07-27 ASSESSMENT — PAIN - FUNCTIONAL ASSESSMENT
PAIN_FUNCTIONAL_ASSESSMENT: 0-10

## 2024-07-27 ASSESSMENT — PAIN SCALES - GENERAL
PAINLEVEL_OUTOF10: 0 - NO PAIN

## 2024-07-27 NOTE — NURSING NOTE
Assumed care of this patient .  Patient is alert and oriented x4.  Patient is calm and cooperative with care.  Patient is currently on the HF/NC 40L/60%.   Patient denied pain/discomfort.  Patient 's bed in the lowest , locked position.  Patient's call light is within reach.

## 2024-07-27 NOTE — NURSING NOTE
Patient is lying in the bed resting.  Patient is currently on HF/NC at 40L/60%.  Patient bed is lowest , locked position.  Patient's call light is within reach.

## 2024-07-27 NOTE — CARE PLAN
The patient's goals for the shift include      The clinical goals for the shift include Decrease O2 needs      Problem: Respiratory  Goal: No signs of respiratory distress (eg. Use of accessory muscles. Peds grunting)  Outcome: Progressing  Goal: Verbalize decreased shortness of breath this shift  Outcome: Progressing  Goal: Wean oxygen to maintain O2 saturation per order/standard this shift  Outcome: Progressing     Problem: Skin  Goal: Decreased wound size/increased tissue granulation at next dressing change  Outcome: Progressing  Flowsheets (Taken 7/27/2024 1019)  Decreased wound size/increased tissue granulation at next dressing change: Protective dressings over bony prominences  Goal: Participates in plan/prevention/treatment measures  Outcome: Progressing  Flowsheets (Taken 7/27/2024 1019)  Participates in plan/prevention/treatment measures:   Elevate heels   Increase activity/out of bed for meals  Goal: Prevent/manage excess moisture  Outcome: Progressing  Flowsheets (Taken 7/27/2024 1019)  Prevent/manage excess moisture:   Monitor for/manage infection if present   Moisturize dry skin  Goal: Prevent/minimize sheer/friction injuries  Outcome: Progressing  Flowsheets (Taken 7/27/2024 1019)  Prevent/minimize sheer/friction injuries:   HOB 30 degrees or less   Increase activity/out of bed for meals   Turn/reposition every 2 hours/use positioning/transfer devices  Goal: Promote/optimize nutrition  Outcome: Progressing  Flowsheets (Taken 7/27/2024 1019)  Promote/optimize nutrition:   Discuss with provider if NPO > 2 days   Monitor/record intake including meals  Goal: Promote skin healing  Outcome: Progressing  Flowsheets (Taken 7/27/2024 1019)  Promote skin healing:   Ensure correct size (line/device) and apply per  instructions   Protective dressings over bony prominences   Rotate device position/do not position patient on device     Problem: Pain  Goal: Takes deep breaths with improved pain control  throughout the shift  Outcome: Progressing  Goal: Turns in bed with improved pain control throughout the shift  Outcome: Progressing  Goal: Walks with improved pain control throughout the shift  Outcome: Progressing  Goal: Performs ADL's with improved pain control throughout shift  Outcome: Progressing  Goal: Participates in PT with improved pain control throughout the shift  Outcome: Progressing  Goal: Free from opioid side effects throughout the shift  Outcome: Progressing  Goal: Free from acute confusion related to pain meds throughout the shift  Outcome: Progressing     Problem: Pain - Adult  Goal: Verbalizes/displays adequate comfort level or baseline comfort level  Outcome: Progressing     Problem: Safety - Adult  Goal: Free from fall injury  Outcome: Progressing     Problem: Discharge Planning  Goal: Discharge to home or other facility with appropriate resources  Outcome: Progressing     Problem: Chronic Conditions and Co-morbidities  Goal: Patient's chronic conditions and co-morbidity symptoms are monitored and maintained or improved  Outcome: Progressing     Problem: ADLs  Goal: Pt will complete ADL tasks at mod I with use of AE prn   Outcome: Progressing     Problem: Instrumental Activities of Daily Living  Goal: Pt will perform simple IADLs/retrieval of items at mod I.    Outcome: Progressing

## 2024-07-27 NOTE — PROGRESS NOTES
Irma Baumann is a 79 y.o. female on day 7 of admission presenting with COVID-19.      Subjective   This is a 79-year-old woman presented to the ER with COVID pneumonitis.  She was treated for COVID as well as a superimposed bacterial pneumonia.  She has completed her antibiotics but remains on Decadron.  Still requiring high flow nasal cannula.  Transferred out of the ICU overnight.    Patient reports she feels well.  Denies any pain.  Says she is tired of being here but understands what she needs to.  Working with her incentive spirometer.       Objective     Last Recorded Vitals  /78 (BP Location: Left arm, Patient Position: Sitting)   Pulse 87   Temp 35.9 °C (96.7 °F) (Oral)   Resp 22   Wt 74.1 kg (163 lb 5.8 oz)   SpO2 93%   Intake/Output last 3 Shifts:    Intake/Output Summary (Last 24 hours) at 7/27/2024 1323  Last data filed at 7/27/2024 0800  Gross per 24 hour   Intake 200 ml   Output 190 ml   Net 10 ml       Admission Weight  Weight: 72.6 kg (160 lb) (07/20/24 1725)    Daily Weight  07/27/24 : 74.1 kg (163 lb 5.8 oz)    Image Results  XR chest 1 view  Narrative: Interpreted By:  Nichol Walton,   STUDY:  XR CHEST 1 VIEW;  7/27/2024 6:10 am      INDICATION:  Signs/Symptoms:COVID pna.      COMPARISON:  07/25/2024      ACCESSION NUMBER(S):  [id number]      ORDERING CLINICIAN:  KALLIE ORO      TECHNIQUE:  Portable AP upright      FINDINGS:  Bilateral basilar atelectasis is similar to the prior study and  partially obscures the cardiac silhouette. There is no obvious  cardiomegaly. Left costophrenic angle is blunted consistent with  minimal pleural fluid. No acute change is noted in the osseous  structures.      1.4 cm eggshell calcification in the left upper quadrant of the  abdomen is consistent with a calcified splenic artery aneurysm.      Impression: Bilateral basilar atelectasis and minimal left pleural effusion      MACRO:  None.      Signed by: Nichol Walton 7/27/2024 9:14  AM  Dictation workstation:   [id number]      Physical Exam  General: alert, no diaphoresis   HENT: mucous membranes moist, external ears normal, no rhinorrhea   Eyes: no icterus or injection, no discharge   Lungs: CTA BL   Heart: RRR, no LE edema BL   GI: abdomen soft, nontender, nondistended, BS present   MSK: no joint effusion or deformity   Skin: no rashes, erythema, or ecchymosis   Neuro: grossly normal cognition, motor strength, sensation    Relevant Results               Assessment/Plan                  Principal Problem:    COVID-19    COVID-19  -Remains on Decadron.  Completed her remdesivir  -Incentive spirometer.  Symptomatic treatment  -Start DuoNebs, inhaled steroids, both of which she takes at home    Acute respiratory failure with hypoxia  -Initially on 40L/100%  She is now down to 40L/50%.  Respiratory therapy is slowly weaning this  -Incentive spirometer.  Breathing treatment started as above  -Consult pulmonology.  They are aware of the case and will see the patient on Monday    Bacterial pneumonia, unknown organism  -Completed treatment with Rocephin, azithromycin    Hyperglycemia  -Secondary to systemic steroids.  She is on sliding scale, Accu-Cheks.  No anticipated treatment on discharge    Hypertension  - on home metoprolol, maxzide    Hypothyroidism  - synthroid    Thrush- resolved    Weakness  - PT/OT    Dvt ppx  - lovenox              Celeste Mchugh DO

## 2024-07-27 NOTE — CARE PLAN
The patient's goals for the shift include      The clinical goals for the shift include decrease O2 needs, increase mobility

## 2024-07-28 VITALS
TEMPERATURE: 97.2 F | SYSTOLIC BLOOD PRESSURE: 149 MMHG | BODY MASS INDEX: 31.08 KG/M2 | WEIGHT: 168.87 LBS | HEIGHT: 62 IN | RESPIRATION RATE: 19 BRPM | HEART RATE: 87 BPM | DIASTOLIC BLOOD PRESSURE: 74 MMHG | OXYGEN SATURATION: 93 %

## 2024-07-28 LAB
ALBUMIN SERPL-MCNC: 3 G/DL (ref 3.5–5)
ANION GAP SERPL CALC-SCNC: 9 MMOL/L
BASOPHILS # BLD AUTO: 0.03 X10*3/UL (ref 0–0.1)
BASOPHILS NFR BLD AUTO: 0.2 %
BUN SERPL-MCNC: 25 MG/DL (ref 8–25)
CALCIUM SERPL-MCNC: 9.5 MG/DL (ref 8.5–10.4)
CHLORIDE SERPL-SCNC: 100 MMOL/L (ref 97–107)
CO2 SERPL-SCNC: 29 MMOL/L (ref 24–31)
CREAT SERPL-MCNC: 0.8 MG/DL (ref 0.4–1.6)
EGFRCR SERPLBLD CKD-EPI 2021: 76 ML/MIN/1.73M*2
EOSINOPHIL # BLD AUTO: 0.03 X10*3/UL (ref 0–0.4)
EOSINOPHIL NFR BLD AUTO: 0.2 %
ERYTHROCYTE [DISTWIDTH] IN BLOOD BY AUTOMATED COUNT: 14.2 % (ref 11.5–14.5)
GLUCOSE BLD MANUAL STRIP-MCNC: 128 MG/DL (ref 74–99)
GLUCOSE BLD MANUAL STRIP-MCNC: 234 MG/DL (ref 74–99)
GLUCOSE BLD MANUAL STRIP-MCNC: 241 MG/DL (ref 74–99)
GLUCOSE BLD MANUAL STRIP-MCNC: 93 MG/DL (ref 74–99)
GLUCOSE SERPL-MCNC: 121 MG/DL (ref 65–99)
HCT VFR BLD AUTO: 41.7 % (ref 36–46)
HGB BLD-MCNC: 13.9 G/DL (ref 12–16)
IMM GRANULOCYTES # BLD AUTO: 0.24 X10*3/UL (ref 0–0.5)
IMM GRANULOCYTES NFR BLD AUTO: 1.7 % (ref 0–0.9)
LYMPHOCYTES # BLD AUTO: 1.69 X10*3/UL (ref 0.8–3)
LYMPHOCYTES NFR BLD AUTO: 11.9 %
MAGNESIUM SERPL-MCNC: 1.8 MG/DL (ref 1.6–3.1)
MCH RBC QN AUTO: 28.7 PG (ref 26–34)
MCHC RBC AUTO-ENTMCNC: 33.3 G/DL (ref 32–36)
MCV RBC AUTO: 86 FL (ref 80–100)
MONOCYTES # BLD AUTO: 0.87 X10*3/UL (ref 0.05–0.8)
MONOCYTES NFR BLD AUTO: 6.1 %
NEUTROPHILS # BLD AUTO: 11.36 X10*3/UL (ref 1.6–5.5)
NEUTROPHILS NFR BLD AUTO: 79.9 %
NRBC BLD-RTO: 0 /100 WBCS (ref 0–0)
PHOSPHATE SERPL-MCNC: 3.4 MG/DL (ref 2.5–4.5)
PLATELET # BLD AUTO: 436 X10*3/UL (ref 150–450)
POTASSIUM SERPL-SCNC: 3.6 MMOL/L (ref 3.4–5.1)
RBC # BLD AUTO: 4.84 X10*6/UL (ref 4–5.2)
SODIUM SERPL-SCNC: 138 MMOL/L (ref 133–145)
WBC # BLD AUTO: 14.2 X10*3/UL (ref 4.4–11.3)

## 2024-07-28 PROCEDURE — 94660 CPAP INITIATION&MGMT: CPT

## 2024-07-28 PROCEDURE — 83735 ASSAY OF MAGNESIUM: CPT

## 2024-07-28 PROCEDURE — 85025 COMPLETE CBC W/AUTO DIFF WBC: CPT

## 2024-07-28 PROCEDURE — 2500000001 HC RX 250 WO HCPCS SELF ADMINISTERED DRUGS (ALT 637 FOR MEDICARE OP)

## 2024-07-28 PROCEDURE — 82947 ASSAY GLUCOSE BLOOD QUANT: CPT

## 2024-07-28 PROCEDURE — 80069 RENAL FUNCTION PANEL: CPT

## 2024-07-28 PROCEDURE — 94640 AIRWAY INHALATION TREATMENT: CPT

## 2024-07-28 PROCEDURE — 2500000004 HC RX 250 GENERAL PHARMACY W/ HCPCS (ALT 636 FOR OP/ED)

## 2024-07-28 PROCEDURE — 36415 COLL VENOUS BLD VENIPUNCTURE: CPT

## 2024-07-28 PROCEDURE — 9420000001 HC RT PATIENT EDUCATION 5 MIN

## 2024-07-28 PROCEDURE — 2500000005 HC RX 250 GENERAL PHARMACY W/O HCPCS: Performed by: HOSPITALIST

## 2024-07-28 PROCEDURE — 2060000001 HC INTERMEDIATE ICU ROOM DAILY

## 2024-07-28 PROCEDURE — 2500000002 HC RX 250 W HCPCS SELF ADMINISTERED DRUGS (ALT 637 FOR MEDICARE OP, ALT 636 FOR OP/ED): Performed by: HOSPITALIST

## 2024-07-28 PROCEDURE — 2500000002 HC RX 250 W HCPCS SELF ADMINISTERED DRUGS (ALT 637 FOR MEDICARE OP, ALT 636 FOR OP/ED)

## 2024-07-28 RX ADMIN — Medication 50 PERCENT: at 03:00

## 2024-07-28 RX ADMIN — IPRATROPIUM BROMIDE AND ALBUTEROL SULFATE 3 ML: .5; 3 SOLUTION RESPIRATORY (INHALATION) at 07:48

## 2024-07-28 RX ADMIN — Medication 35 L/MIN: at 11:46

## 2024-07-28 RX ADMIN — GUAIFENESIN 200 MG: 200 SOLUTION ORAL at 09:53

## 2024-07-28 RX ADMIN — ENOXAPARIN SODIUM 40 MG: 40 INJECTION SUBCUTANEOUS at 09:48

## 2024-07-28 RX ADMIN — BENZOCAINE 6 MG-MENTHOL 10 MG LOZENGES 1 LOZENGE: at 22:36

## 2024-07-28 RX ADMIN — Medication 35 L/MIN: at 07:52

## 2024-07-28 RX ADMIN — Medication 35 L/MIN: at 15:37

## 2024-07-28 RX ADMIN — GUAIFENESIN 200 MG: 200 SOLUTION ORAL at 18:15

## 2024-07-28 RX ADMIN — ASPIRIN 81 MG 81 MG: 81 TABLET ORAL at 09:48

## 2024-07-28 RX ADMIN — TRIAMTERENE AND HYDROCHLOROTHIAZIDE 1 TABLET: 37.5; 25 TABLET ORAL at 09:48

## 2024-07-28 RX ADMIN — IPRATROPIUM BROMIDE AND ALBUTEROL SULFATE 3 ML: .5; 3 SOLUTION RESPIRATORY (INHALATION) at 21:05

## 2024-07-28 RX ADMIN — BENZOCAINE 6 MG-MENTHOL 10 MG LOZENGES 1 LOZENGE: at 06:15

## 2024-07-28 RX ADMIN — GUAIFENESIN 200 MG: 200 SOLUTION ORAL at 02:08

## 2024-07-28 RX ADMIN — GUAIFENESIN 200 MG: 200 SOLUTION ORAL at 22:55

## 2024-07-28 RX ADMIN — IPRATROPIUM BROMIDE AND ALBUTEROL SULFATE 3 ML: .5; 3 SOLUTION RESPIRATORY (INHALATION) at 02:26

## 2024-07-28 RX ADMIN — METOPROLOL TARTRATE 25 MG: 25 TABLET, FILM COATED ORAL at 09:48

## 2024-07-28 RX ADMIN — METOPROLOL TARTRATE 25 MG: 25 TABLET, FILM COATED ORAL at 21:17

## 2024-07-28 RX ADMIN — LEVOTHYROXINE SODIUM 25 MCG: 0.03 TABLET ORAL at 06:15

## 2024-07-28 RX ADMIN — DEXAMETHASONE SODIUM PHOSPHATE 6 MG: 10 INJECTION INTRAMUSCULAR; INTRAVENOUS at 09:48

## 2024-07-28 RX ADMIN — SENNOSIDES AND DOCUSATE SODIUM 1 TABLET: 50; 8.6 TABLET ORAL at 21:17

## 2024-07-28 RX ADMIN — Medication 35 L/MIN: at 19:00

## 2024-07-28 RX ADMIN — GUAIFENESIN 200 MG: 200 SOLUTION ORAL at 06:16

## 2024-07-28 RX ADMIN — IPRATROPIUM BROMIDE AND ALBUTEROL SULFATE 3 ML: .5; 3 SOLUTION RESPIRATORY (INHALATION) at 12:00

## 2024-07-28 RX ADMIN — BUDESONIDE INHALATION 0.25 MG: 0.5 SUSPENSION RESPIRATORY (INHALATION) at 07:48

## 2024-07-28 RX ADMIN — SIMVASTATIN 40 MG: 40 TABLET, FILM COATED ORAL at 21:17

## 2024-07-28 ASSESSMENT — PAIN - FUNCTIONAL ASSESSMENT
PAIN_FUNCTIONAL_ASSESSMENT: 0-10

## 2024-07-28 ASSESSMENT — PAIN SCALES - GENERAL
PAINLEVEL_OUTOF10: 0 - NO PAIN

## 2024-07-28 NOTE — CARE PLAN
The patient's goals for the shift include      The clinical goals for the shift include pt will remain hemodynamically stable      Problem: Respiratory  Goal: No signs of respiratory distress (eg. Use of accessory muscles. Peds grunting)  Outcome: Progressing  Goal: Verbalize decreased shortness of breath this shift  Outcome: Progressing  Goal: Wean oxygen to maintain O2 saturation per order/standard this shift  Outcome: Progressing     Problem: Skin  Goal: Decreased wound size/increased tissue granulation at next dressing change  Outcome: Progressing  Flowsheets (Taken 7/28/2024 1046)  Decreased wound size/increased tissue granulation at next dressing change: Protective dressings over bony prominences  Goal: Participates in plan/prevention/treatment measures  Outcome: Progressing  Flowsheets (Taken 7/28/2024 1046)  Participates in plan/prevention/treatment measures: Elevate heels  Goal: Prevent/manage excess moisture  Outcome: Progressing  Flowsheets (Taken 7/28/2024 1046)  Prevent/manage excess moisture: Moisturize dry skin  Goal: Prevent/minimize sheer/friction injuries  Outcome: Progressing  Flowsheets (Taken 7/28/2024 1046)  Prevent/minimize sheer/friction injuries:   HOB 30 degrees or less   Increase activity/out of bed for meals   Turn/reposition every 2 hours/use positioning/transfer devices  Goal: Promote/optimize nutrition  Outcome: Progressing  Flowsheets (Taken 7/28/2024 1046)  Promote/optimize nutrition: Discuss with provider if NPO > 2 days  Goal: Promote skin healing  Outcome: Progressing  Flowsheets (Taken 7/28/2024 1046)  Promote skin healing:   Ensure correct size (line/device) and apply per  instructions   Protective dressings over bony prominences     Problem: Pain  Goal: Takes deep breaths with improved pain control throughout the shift  Outcome: Progressing  Goal: Turns in bed with improved pain control throughout the shift  Outcome: Progressing  Goal: Walks with improved pain  control throughout the shift  Outcome: Progressing  Goal: Performs ADL's with improved pain control throughout shift  Outcome: Progressing  Goal: Participates in PT with improved pain control throughout the shift  Outcome: Progressing  Goal: Free from opioid side effects throughout the shift  Outcome: Progressing  Goal: Free from acute confusion related to pain meds throughout the shift  Outcome: Progressing     Problem: Pain - Adult  Goal: Verbalizes/displays adequate comfort level or baseline comfort level  Outcome: Progressing     Problem: Safety - Adult  Goal: Free from fall injury  Outcome: Progressing     Problem: Discharge Planning  Goal: Discharge to home or other facility with appropriate resources  Outcome: Progressing     Problem: Chronic Conditions and Co-morbidities  Goal: Patient's chronic conditions and co-morbidity symptoms are monitored and maintained or improved  Outcome: Progressing     Problem: ADLs  Goal: Pt will complete ADL tasks at mod I with use of AE prn   Outcome: Progressing     Problem: Instrumental Activities of Daily Living  Goal: Pt will perform simple IADLs/retrieval of items at mod I.    Outcome: Progressing

## 2024-07-28 NOTE — NURSING NOTE
Assumed care of this patient.  Patient is alert and oriented x4.  Patient is pleasant and cooperative with care.  Patient is currently on the HF/NC. 35L/50%.  Patient denied pain/discomfort.  Patient's bed is in the lowest, locked position. Patient's call light is within reach.

## 2024-07-28 NOTE — PROGRESS NOTES
Irma Baumann is a 79 y.o. female on day 8 of admission presenting with COVID-19.      Subjective   Patient reports she is frustrated by slow progress and is tired of being stuck in the room, not moving around very much. Denies any pain. Still having a lot of cough. Happy with addition of breathing treatments that started yesterday.    On 35L/50%       Objective     Last Recorded Vitals  /66 (BP Location: Left arm, Patient Position: Lying)   Pulse 55   Temp 36.1 °C (97 °F) (Temporal)   Resp 24   Wt 76.6 kg (168 lb 14 oz)   SpO2 96%   Intake/Output last 3 Shifts:    Intake/Output Summary (Last 24 hours) at 7/28/2024 1226  Last data filed at 7/28/2024 0900  Gross per 24 hour   Intake 250 ml   Output --   Net 250 ml       Admission Weight  Weight: 72.6 kg (160 lb) (07/20/24 1725)    Daily Weight  07/28/24 : 76.6 kg (168 lb 14 oz)    Image Results  XR chest 1 view  Narrative: Interpreted By:  Nichol Walton,   STUDY:  XR CHEST 1 VIEW;  7/27/2024 6:10 am      INDICATION:  Signs/Symptoms:COVID pna.      COMPARISON:  07/25/2024      ACCESSION NUMBER(S):  [id number]      ORDERING CLINICIAN:  KALLIE ORO      TECHNIQUE:  Portable AP upright      FINDINGS:  Bilateral basilar atelectasis is similar to the prior study and  partially obscures the cardiac silhouette. There is no obvious  cardiomegaly. Left costophrenic angle is blunted consistent with  minimal pleural fluid. No acute change is noted in the osseous  structures.      1.4 cm eggshell calcification in the left upper quadrant of the  abdomen is consistent with a calcified splenic artery aneurysm.      Impression: Bilateral basilar atelectasis and minimal left pleural effusion      MACRO:  None.      Signed by: Nichol Walton 7/27/2024 9:14 AM  Dictation workstation:   [id number]      Physical Exam  General: alert, no diaphoresis   Lungs: diminished, frequent cough   Heart: RRR, no LE edema BL   GI: abdomen soft, nontender, nondistended, BS  present   MSK: no joint effusion or deformity   Skin: no rashes, erythema, or ecchymosis   Neuro: grossly normal cognition, motor strength, sensation    Relevant Results               Assessment/Plan                  Principal Problem:    COVID-19    COVID-19  -Remains on Decadron.  Completed her remdesivir  -Incentive spirometer.  Symptomatic treatment  -Start DuoNebs, inhaled steroids, both of which she takes at home    Acute respiratory failure with hypoxia  -Initially on 40L/100%  She is now down to 350L/50%.  Respiratory therapy is slowly weaning this- discussed with RT today, patient was turned up overnight, but now is back down. Not much room to go down though as her pulsox is 93%  -Incentive spirometer.  Breathing treatment started as above  -Discussed with Dr. Banuelos    Bacterial pneumonia, unknown organism  -Completed treatment with Rocephin, azithromycin  - repeat CXR tomorrow given some more productive cough now    Hyperglycemia  -Secondary to systemic steroids.  She is on sliding scale, Accu-Cheks.  No anticipated treatment on discharge    Hypertension  - on home metoprolol, maxzide    Hypothyroidism  - synthroid    Thrush- resolved    Weakness  - PT/OT    Dvt ppx  - lovenox              Celeste Mchugh DO

## 2024-07-28 NOTE — CONSULTS
Pulmonary Consult Note    Chief Complaint   Patient presents with    Shortness of Breath     10 days ago started having a cough. Hx. Asthma. Now SOB and hypoxic at . Inhaler not helping much.    Cough        Reason for consultation:  Acute respiratory failure with hypoxia, asthma, COVID-19    History Of Present Illness  Irma Baumann is a 79 y.o. female presenting with increasing shortness of breath and was admitted to the ICU on 7/28/2024 given significant oxygen requirements.  She picked up sore throat during a 2-week vacation to [place].  She also endorsed escalating shortness of breath throughout and upon return.  She denies any fevers or chills.  Given persistent and escalating complaints, she decided to seek medical attention and was found to be significantly hypoxic.  She was escalated to high flow nasal cannula.  She was transferred out of the ICU yesterday and were asked to assist in her management.  Endorses a intermittently productive cough.  Does carry diagnosis of asthma but does not follow with a local pulmonologist.     Past Medical History  Hypertension, asthma, gout, hypothyroidism    Surgical History  She has a past surgical history that includes Other surgical history (11/30/2022) and Other surgical history (11/30/2022).     Social History  She reports that she has never smoked. She has never been exposed to tobacco smoke. She has never used smokeless tobacco. She reports current alcohol use. She reports that she does not use drugs.    Family History  Family History   Problem Relation Name Age of Onset    Hypertension Mother      Other (cva) Mother      Diabetes Mother      Stroke Mother      Hypertension Father      Other (cva) Father      Stroke Father      Diabetes Sister      Heart disease Brother      No Known Problems Son      No Known Problems Son          Allergies  Levofloxacin, Allopurinol, Cephalexin, Ciprofloxacin, Sulfamethoxazole-trimethoprim, Colchicine, and  "Penicillins    Review of Systems   All other systems reviewed and are negative.      Last Recorded Vitals  Blood pressure 125/66, pulse 55, temperature 36.4 °C (97.5 °F), temperature source Temporal, resp. rate 24, height 1.575 m (5' 2\"), weight 76.6 kg (168 lb 14 oz), SpO2 95%.     Physical Exam  Vitals reviewed.   Constitutional:       General: She is not in acute distress.     Interventions: Nasal cannula in place.      Comments: Vapotherm   HENT:      Head: Normocephalic and atraumatic.      Nose: Nose normal.      Mouth/Throat:      Mouth: Mucous membranes are moist.      Pharynx: Oropharynx is clear.   Eyes:      General: No scleral icterus.     Conjunctiva/sclera: Conjunctivae normal.   Cardiovascular:      Rate and Rhythm: Normal rate and regular rhythm.   Pulmonary:      Effort: Pulmonary effort is normal.      Breath sounds: Examination of the right-lower field reveals decreased breath sounds. Examination of the left-lower field reveals decreased breath sounds. Decreased breath sounds present. No wheezing.      Comments: Bilateral inspiratory squeaks  Musculoskeletal:      Right lower leg: No edema.      Left lower leg: No edema.   Skin:     General: Skin is warm and dry.   Neurological:      General: No focal deficit present.      Mental Status: She is alert.   Psychiatric:         Mood and Affect: Mood normal.         Behavior: Behavior normal.         Meds  Scheduled medications  aspirin, 81 mg, oral, Daily  budesonide, 0.25 mg, nebulization, Daily  dexAMETHasone, 6 mg, intravenous, q AM  enoxaparin, 40 mg, subcutaneous, q24h  febuxostat, 80 mg, oral, Once per day on Monday Wednesday Friday  insulin regular, 0-5 Units, subcutaneous, With meals & nightly  ipratropium-albuteroL, 3 mL, nebulization, q6h  levothyroxine, 25 mcg, oral, Daily before breakfast  metoprolol tartrate, 25 mg, oral, BID  oxygen, , inhalation, q4h  sennosides-docusate sodium, 1 tablet, oral, Nightly  simvastatin, 40 mg, oral, q " PM  triamterene-hydrochlorothiazid, 1 tablet, oral, Daily      Continuous medications     PRN medications  PRN medications: acetaminophen, albuterol, benzocaine-menthol, dextrose, dextrose, glucagon, glucagon, guaiFENesin, loperamide, melatonin, oxygen, polyethylene glycol       Relevant Results  CBC and BMP reviewed  All chest imaging on this admission reviewed, chest CT on admission shows no pulmonary embolism, bilateral airspace disease consistent with extensive pneumonia likely correlating with her COVID-19 infection  Recent chest x-ray shows low lung volumes with bibasilar atelectatic changes    Principal Problem:    COVID-19       Recommendations  Acute respiratory failure with hypoxia  Continue with Vapotherm and hopefully if continues in this trend we can wean to low-flow nasal cannula potentially in the upcoming 48 hours  Encourage incentive spirometry  COVID-19 pneumonitis  Continue with Decadron  Completed a course it seems of ceftriaxone and azithromycin  Completed a course of 5 days of remdesivir  Asthma  Steroids as above  Agree with Pulmicort  Continue with short acting bronchodilators  PFTs as an outpatient  Gave her my contact information and will be more than happy to assist in her asthma as an outpatient setting  DVT prophylaxis noted  Pulmonary disposition: Will see trend of oxygenation in the upcoming 24 to 48 hours.  Hopefully we can transition to nasal cannula but will depend on her response      Thank you for this consultation.  We will follow with you       Valarie Banuelos MD  Pulmonary/Critical Care Physician

## 2024-07-29 ENCOUNTER — APPOINTMENT (OUTPATIENT)
Dept: RADIOLOGY | Facility: HOSPITAL | Age: 79
End: 2024-07-29
Payer: MEDICARE

## 2024-07-29 LAB
ALBUMIN SERPL-MCNC: 3.1 G/DL (ref 3.5–5)
ANION GAP SERPL CALC-SCNC: 13 MMOL/L
BASOPHILS # BLD AUTO: 0.03 X10*3/UL (ref 0–0.1)
BASOPHILS NFR BLD AUTO: 0.2 %
BUN SERPL-MCNC: 25 MG/DL (ref 8–25)
CALCIUM SERPL-MCNC: 9.6 MG/DL (ref 8.5–10.4)
CHLORIDE SERPL-SCNC: 100 MMOL/L (ref 97–107)
CO2 SERPL-SCNC: 26 MMOL/L (ref 24–31)
CREAT SERPL-MCNC: 0.8 MG/DL (ref 0.4–1.6)
EGFRCR SERPLBLD CKD-EPI 2021: 76 ML/MIN/1.73M*2
EOSINOPHIL # BLD AUTO: 0.02 X10*3/UL (ref 0–0.4)
EOSINOPHIL NFR BLD AUTO: 0.1 %
ERYTHROCYTE [DISTWIDTH] IN BLOOD BY AUTOMATED COUNT: 13.9 % (ref 11.5–14.5)
GLUCOSE BLD MANUAL STRIP-MCNC: 123 MG/DL (ref 74–99)
GLUCOSE BLD MANUAL STRIP-MCNC: 136 MG/DL (ref 74–99)
GLUCOSE BLD MANUAL STRIP-MCNC: 228 MG/DL (ref 74–99)
GLUCOSE BLD MANUAL STRIP-MCNC: 287 MG/DL (ref 74–99)
GLUCOSE SERPL-MCNC: 124 MG/DL (ref 65–99)
HCT VFR BLD AUTO: 41.9 % (ref 36–46)
HGB BLD-MCNC: 13.9 G/DL (ref 12–16)
IMM GRANULOCYTES # BLD AUTO: 0.17 X10*3/UL (ref 0–0.5)
IMM GRANULOCYTES NFR BLD AUTO: 1.2 % (ref 0–0.9)
LYMPHOCYTES # BLD AUTO: 1.6 X10*3/UL (ref 0.8–3)
LYMPHOCYTES NFR BLD AUTO: 11.1 %
MAGNESIUM SERPL-MCNC: 1.8 MG/DL (ref 1.6–3.1)
MCH RBC QN AUTO: 28.5 PG (ref 26–34)
MCHC RBC AUTO-ENTMCNC: 33.2 G/DL (ref 32–36)
MCV RBC AUTO: 86 FL (ref 80–100)
MONOCYTES # BLD AUTO: 0.86 X10*3/UL (ref 0.05–0.8)
MONOCYTES NFR BLD AUTO: 6 %
NEUTROPHILS # BLD AUTO: 11.67 X10*3/UL (ref 1.6–5.5)
NEUTROPHILS NFR BLD AUTO: 81.4 %
NRBC BLD-RTO: 0 /100 WBCS (ref 0–0)
PHOSPHATE SERPL-MCNC: 3.5 MG/DL (ref 2.5–4.5)
PLATELET # BLD AUTO: 402 X10*3/UL (ref 150–450)
POTASSIUM SERPL-SCNC: 3.4 MMOL/L (ref 3.4–5.1)
RBC # BLD AUTO: 4.88 X10*6/UL (ref 4–5.2)
SODIUM SERPL-SCNC: 139 MMOL/L (ref 133–145)
WBC # BLD AUTO: 14.4 X10*3/UL (ref 4.4–11.3)

## 2024-07-29 PROCEDURE — 84100 ASSAY OF PHOSPHORUS: CPT

## 2024-07-29 PROCEDURE — 94760 N-INVAS EAR/PLS OXIMETRY 1: CPT

## 2024-07-29 PROCEDURE — 2500000001 HC RX 250 WO HCPCS SELF ADMINISTERED DRUGS (ALT 637 FOR MEDICARE OP)

## 2024-07-29 PROCEDURE — 85025 COMPLETE CBC W/AUTO DIFF WBC: CPT

## 2024-07-29 PROCEDURE — 71046 X-RAY EXAM CHEST 2 VIEWS: CPT

## 2024-07-29 PROCEDURE — 2500000002 HC RX 250 W HCPCS SELF ADMINISTERED DRUGS (ALT 637 FOR MEDICARE OP, ALT 636 FOR OP/ED)

## 2024-07-29 PROCEDURE — 2500000002 HC RX 250 W HCPCS SELF ADMINISTERED DRUGS (ALT 637 FOR MEDICARE OP, ALT 636 FOR OP/ED): Performed by: INTERNAL MEDICINE

## 2024-07-29 PROCEDURE — 97116 GAIT TRAINING THERAPY: CPT | Mod: GP,CQ

## 2024-07-29 PROCEDURE — 94664 DEMO&/EVAL PT USE INHALER: CPT

## 2024-07-29 PROCEDURE — 83735 ASSAY OF MAGNESIUM: CPT

## 2024-07-29 PROCEDURE — 9420000001 HC RT PATIENT EDUCATION 5 MIN

## 2024-07-29 PROCEDURE — 97530 THERAPEUTIC ACTIVITIES: CPT | Mod: GO,CO

## 2024-07-29 PROCEDURE — 97535 SELF CARE MNGMENT TRAINING: CPT | Mod: GO,CO

## 2024-07-29 PROCEDURE — 2500000005 HC RX 250 GENERAL PHARMACY W/O HCPCS: Performed by: HOSPITALIST

## 2024-07-29 PROCEDURE — 2500000004 HC RX 250 GENERAL PHARMACY W/ HCPCS (ALT 636 FOR OP/ED)

## 2024-07-29 PROCEDURE — 71046 X-RAY EXAM CHEST 2 VIEWS: CPT | Performed by: RADIOLOGY

## 2024-07-29 PROCEDURE — 36415 COLL VENOUS BLD VENIPUNCTURE: CPT

## 2024-07-29 PROCEDURE — 82947 ASSAY GLUCOSE BLOOD QUANT: CPT

## 2024-07-29 PROCEDURE — 2060000001 HC INTERMEDIATE ICU ROOM DAILY

## 2024-07-29 PROCEDURE — 2500000002 HC RX 250 W HCPCS SELF ADMINISTERED DRUGS (ALT 637 FOR MEDICARE OP, ALT 636 FOR OP/ED): Performed by: HOSPITALIST

## 2024-07-29 PROCEDURE — 97110 THERAPEUTIC EXERCISES: CPT | Mod: GP,CQ

## 2024-07-29 PROCEDURE — 2500000004 HC RX 250 GENERAL PHARMACY W/ HCPCS (ALT 636 FOR OP/ED): Performed by: INTERNAL MEDICINE

## 2024-07-29 PROCEDURE — 94640 AIRWAY INHALATION TREATMENT: CPT

## 2024-07-29 RX ORDER — FUROSEMIDE 10 MG/ML
20 INJECTION INTRAMUSCULAR; INTRAVENOUS ONCE
Status: COMPLETED | OUTPATIENT
Start: 2024-07-29 | End: 2024-07-29

## 2024-07-29 RX ORDER — POTASSIUM CHLORIDE 20 MEQ/1
20 TABLET, EXTENDED RELEASE ORAL ONCE
Status: COMPLETED | OUTPATIENT
Start: 2024-07-29 | End: 2024-07-29

## 2024-07-29 ASSESSMENT — COGNITIVE AND FUNCTIONAL STATUS - GENERAL
CLIMB 3 TO 5 STEPS WITH RAILING: A LITTLE
TOILETING: A LOT
DAILY ACTIVITIY SCORE: 19
DRESSING REGULAR LOWER BODY CLOTHING: A LITTLE
DRESSING REGULAR UPPER BODY CLOTHING: A LITTLE
MOBILITY SCORE: 24
DAILY ACTIVITIY SCORE: 24
MOBILITY SCORE: 20
WALKING IN HOSPITAL ROOM: A LITTLE
STANDING UP FROM CHAIR USING ARMS: A LITTLE
MOVING TO AND FROM BED TO CHAIR: A LITTLE
HELP NEEDED FOR BATHING: A LITTLE

## 2024-07-29 ASSESSMENT — PAIN SCALES - GENERAL
PAINLEVEL_OUTOF10: 0 - NO PAIN

## 2024-07-29 ASSESSMENT — PAIN - FUNCTIONAL ASSESSMENT
PAIN_FUNCTIONAL_ASSESSMENT: 0-10

## 2024-07-29 ASSESSMENT — ACTIVITIES OF DAILY LIVING (ADL): HOME_MANAGEMENT_TIME_ENTRY: 10

## 2024-07-29 NOTE — PROGRESS NOTES
Irma Baumann is a 79 y.o. female on day 9 of admission presenting with COVID-19.      Subjective   Here with COVID and acute respiratory failure.  She is on 50% high flow nasal cannula.  She already completed a course of antivirals and antibiotics.  She is finishing up a course of steroids. she feels very fatigued.  She is complaining that her legs are swollen.  She does take triamterene       Objective  hydrochlorothiazide.  Rest  Heart regular rate and rhythm  Lungs faint diffuse crackles  Abdomen soft nontender nondistended  Extremities 1+ pitting bilateral leg edema    Last Recorded Vitals  /56 (BP Location: Left arm, Patient Position: Sitting)   Pulse 71   Temp 36.2 °C (97.2 °F) (Temporal)   Resp 25   Wt 71.3 kg (157 lb 3 oz)   SpO2 93%   Intake/Output last 3 Shifts:    Intake/Output Summary (Last 24 hours) at 7/29/2024 1220  Last data filed at 7/29/2024 1100  Gross per 24 hour   Intake 325 ml   Output 250 ml   Net 75 ml       Admission Weight  Weight: 72.6 kg (160 lb) (07/20/24 1725)    Daily Weight  07/29/24 : 71.3 kg (157 lb 3 oz)    Image Results  XR chest 1 view  Narrative: Interpreted By:  Nichol Walton,   STUDY:  XR CHEST 1 VIEW;  7/27/2024 6:10 am      INDICATION:  Signs/Symptoms:COVID pna.      COMPARISON:  07/25/2024      ACCESSION NUMBER(S):  [id number]      ORDERING CLINICIAN:  KALLIE ORO      TECHNIQUE:  Portable AP upright      FINDINGS:  Bilateral basilar atelectasis is similar to the prior study and  partially obscures the cardiac silhouette. There is no obvious  cardiomegaly. Left costophrenic angle is blunted consistent with  minimal pleural fluid. No acute change is noted in the osseous  structures.      1.4 cm eggshell calcification in the left upper quadrant of the  abdomen is consistent with a calcified splenic artery aneurysm.      Impression: Bilateral basilar atelectasis and minimal left pleural effusion      MACRO:  None.      Signed by: Nichol Walton 7/27/2024  9:14 AM  Dictation workstation:   [id number]        Assessment/Plan       coronavirus and persisting acute respiratory failure with hypoxemia..  She is complaining of fluid overload and there is objective evidence of this. she is on her triamterene hydrochlorothiazide.  Her blood pressure is a bit on the high side.  Her creatinine is 0.8.      I discussed with pulmonary possibly giving her a little extra diuretic today.  He agreed. we will give her just 20 mg IV Lasix and 20 mill colons of potassium as well.  Recheck labs tomorrow.      I did discuss with the patient that if her oxygenation does not start to improve that we are going to have to send her to an LTAC            Wojciech Mendoza MD

## 2024-07-29 NOTE — PROGRESS NOTES
Physical Therapy    Physical Therapy Treatment    Patient Name: Irma Baumann  MRN: [id number]  Today's Date: 7/29/2024  Time Calculation  Start Time: 1257  Stop Time: 1320  Time Calculation (min): 23 min    Assessment/Plan   PT Assessment  End of Session Communication: Bedside nurse  End of Session Patient Position: Up in chair, Alarm off, not on at start of session  PT Plan  Inpatient/Swing Bed or Outpatient: Inpatient  PT Plan  Treatment/Interventions: Bed mobility, Transfer training, Gait training, Balance training, Strengthening, Endurance training, Therapeutic exercise, Therapeutic activity  PT Plan: Ongoing PT  PT Frequency: 5 times per week  PT Discharge Recommendations: Low intensity level of continued care  Equipment Recommended upon Discharge: Other (comment) (none vs cane)  PT Recommended Transfer Status: Contact guard  PT - OK to Discharge: Yes      General Visit Information:   PT  Visit  PT Received On: 07/29/24  General  Prior to Session Communication: Bedside nurse  Patient Position Received: Alarm off, not on at start of session (Pt seated on BSC upon arrival.)  General Comment: Cleared by nursing to be seen for therapy, pt agreeable with tx, seated on BSC upon arrival.    Subjective   Precautions:  Precautions  Precautions Comment: Covid +  Vital Signs:  Vital Signs  Heart Rate: 89  SpO2: 94 % (HFNC 35/50)    Objective   Pain:  Pain Assessment  Pain Assessment: 0-10  0-10 (Numeric) Pain Score: 0 - No pain  Cognition:  Cognition  Overall Cognitive Status: Within Functional Limits     Postural Control:  Static Sitting Balance  Static Sitting-Balance Support: Feet supported  Static Sitting-Level of Assistance: Independent  Static Sitting-Comment/Number of Minutes: Good seated static balance.  Static Standing Balance  Static Standing-Balance Support: No upper extremity supported  Static Standing-Level of Assistance: Contact guard  Static Standing-Comment/Number of Minutes: Fair+ static standing  balance,     Treatments:  Therapeutic Exercise  Therapeutic Exercise Performed: Yes  Therapeutic Exercise Activity 1: Standing heel raises x10  Therapeutic Exercise Activity 2: Standing hip flexion x10  Therapeutic Exercise Activity 3: Standing marches x10  Therapeutic Exercise Activity 4: Mini squats with RUE support x10    Bed Mobility  Bed Mobility: No    Ambulation/Gait Training  Ambulation/Gait Training Performed: Yes  Ambulation/Gait Training 1  Surface 1: Level tile  Device 1: No device  Assistance 1: Close supervision  Quality of Gait 1: Narrow base of support  Comments/Distance (ft) 1: 3-4 steps (BSC to chair) without AD, supervision for balance.    Transfers  Transfer: Yes  Transfer 1  Transfer From 1: Sit to  Transfer to 1: Stand  Technique 1: Sit to stand, Stand to sit  Transfer Level of Assistance 1: Close supervision    Outcome Measures:  AMPAC Basic Mobility  Turning from your back to your side while in a flat bed without using bedrails: None  Moving from lying on your back to sitting on the side of a flat bed without using bedrails: None  Moving to and from bed to chair (including a wheelchair): A little  Standing up from a chair using your arms (e.g. wheelchair or bedside chair): A little  To walk in hospital room: A little  Climbing 3-5 steps with railing: A little  Basic Mobility - Total Score: 20       Encounter Problems       Encounter Problems (Active)       PT Problem       Patient will demonstrate improvements in strength  (Progressing)       Start:  07/26/24    Expected End:  08/23/24            Patient will transfer supine <> sit independently  (Progressing)       Start:  07/26/24    Expected End:  08/23/24            Patient will transfer sit <> stand independently (Progressing)       Start:  07/26/24    Expected End:  08/23/24            Patient will ambulate 150 ft independently   (Progressing)       Start:  07/26/24    Expected End:  08/23/24               Pain - Adult

## 2024-07-29 NOTE — PROGRESS NOTES
Patient not medically clear. Patient remains on high flow oxygen. LTAC? [facility] has not set up HHC, waiting to see if patient can be weaned off high flow. Will continue to follow.      07/29/24 1321   Discharge Planning   Home or Post Acute Services Other (Comment)  (TBD)   Expected Discharge Disposition Other  (TBD)   Does the patient need discharge transport arranged? No

## 2024-07-29 NOTE — PROGRESS NOTES
Occupational Therapy    OT Treatment    Patient Name: Irma Baumann  MRN: [id number]  Today's Date: 7/29/2024  Time Calculation  Start Time: 1302  Stop Time: 1325  Time Calculation (min): 23 min        Assessment:  OT Assessment: Gradual progress made towards OT goals. Continue with current OT POC to increase strength, balance and functional tolerance to maximize safety and independence during ADLs.  Barriers to Discharge: Other (Comment) (O2 requirements)  Evaluation/Treatment Tolerance: Patient tolerated treatment well  End of Session Communication: Bedside nurse  End of Session Patient Position: Up in chair, Alarm off, not on at start of session (all needs in reach)  OT Assessment Results: Decreased ADL status, Decreased upper extremity strength, Decreased endurance, Decreased functional mobility, Decreased IADLs  Barriers to Discharge: Other (Comment) (O2 requirements)  Evaluation/Treatment Tolerance: Patient tolerated treatment well  Plan:  Treatment Interventions: ADL retraining, Functional transfer training, UE strengthening/ROM, Endurance training, Equipment evaluation/education, Patient/family training  OT Frequency: 4 times per week (Collaborated with OTR- pt would benefit from continued OT services x4week to maximize potential prior to discharge.)  OT Discharge Recommendations: Low intensity level of continued care  OT - OK to Discharge: Yes  Treatment Interventions: ADL retraining, Functional transfer training, UE strengthening/ROM, Endurance training, Equipment evaluation/education, Patient/family training    Subjective   Previous Visit Info:  OT Last Visit  OT Received On: 07/29/24  General:  General  Reason for Referral: impaired ADLs s/p Covid+  Past Medical History Relevant to Rehab: Gout, asthma, HLD, hypothyroidism, uterovaginal prolapse, OA, vit D deficiency  Prior to Session Communication: Bedside nurse  Patient Position Received: Alarm off, not on at start of session (seated on BSC)  General  Comment: Pt cleared for OT session per nursing, pleasant and cooperative this date. Increased time and rest breaks required for task completion secondary to SOB.  Precautions:  Hearing/Visual Limitations: glasses, [place]  Medical Precautions: Fall precautions, Oxygen therapy device and L/min (HFNC 35L 50% FiO2)  Precautions Comment: HFNC, telemetry, continuous pulse-ox  Vital Signs:  Vital Signs  Heart Rate: 89  Heart Rate Source: Monitor  Resp: (!) 35  SpO2: 94 %  Patient Position: Standing  Pain:  Pain Assessment  Pain Assessment: 0-10  0-10 (Numeric) Pain Score: 0 - No pain  Clinical Progression: Not changed    Objective    Cognition:  Cognition  Overall Cognitive Status: Within Functional Limits  Orientation Level: Oriented X4  Safety/Judgement: Within Functional Limits  Insight: Mild  Impulsive: Mildly  Processing Speed: Within funtional limits  Coordination:  Movements are Fluid and Coordinated: Yes  Activities of Daily Living: Toileting  Toileting Comments: maxA required to complete posterior hygiene tasks in supported standing  Functional Standing Tolerance:  Time: x10min  Activity: Dynamic standing trials with CGA  Functional Standing Tolerance Comments: Standing tasks completed this date with CGA to increase functional tolerance, strength and challenge balance to maximize safety and independence during ADLs. Fair+ standing balance observed with min displacement OBS.  Bed Mobility/Transfers: Transfers  Transfer: Yes  Transfer 1  Trials/Comments 1: sit<>stands completed from standard BSC and chair heights with close supervision, min verbal cues required for line management to increase safety and decrease risk of falls.    Toilet Transfers  Toilet Transfer From: Chair  Toilet Transfer Type: To and from  Toilet Transfer to: Standard bedside commode  Toilet Transfer Technique: Stand pivot  Toilet Transfers: Supervision  Toilet Transfers Comments: min verbal cues required for line management to increase safety and  decrease risk of falls.    Therapy/Activity: Therapeutic Exercise  Therapeutic Exercise Performed: Yes  Therapeutic Exercise Activity 1: BUE exercises completed 1x10 with min resistance for following exercises: wrist flexion/ extension, pronation/supination, bicep curl, triceps extension, and shoulder press  Therapeutic Exercise Activity 2: Exercises completed this date to increase strength and functional tolerance for safety and ease of ADL/ADL transfer completion. Verbal instruction and demonstration provided to ensure proper muscle recruitment.      Outcome Measures:[facility] Daily Activity  Putting on and taking off regular lower body clothing: A little  Bathing (including washing, rinsing, drying): A little  Putting on and taking off regular upper body clothing: A little  Toileting, which includes using toilet, bedpan or urinal: A lot  Taking care of personal grooming such as brushing teeth: None  Eating Meals: None  Daily Activity - Total Score: 19        Education Documentation  ADL Training, taught by GISELLE Russo at 7/29/2024  1:55 PM.  Learner: Patient  Readiness: Acceptance  Method: Explanation, Demonstration  Response: Needs Reinforcement    Education Comments  Education provided on role of OT/POC, safety awareness throughout functional tasks/transfers, importance of activity/ rest routine, EC/WS techniques, and use of call light for assistance. Questions, comments and concerns addressed regarding OT.      Goals:  Encounter Problems       Encounter Problems (Active)       ADLs       Pt will complete ADL tasks at mod I with use of AE prn  (Progressing)       Start:  07/26/24    Expected End:  08/16/24               Functional Mobility       Pt will perform functional mobility household distances at mod I with use of LRAD.    (Progressing)       Start:  07/26/24    Expected End:  08/16/24               Instrumental Activities of Daily Living       Pt will perform simple IADLs/retrieval of items at  mod I.   (Progressing)       Start:  07/26/24    Expected End:  08/16/24               OT Transfers       Pt will perform functional transfers at mod I.   (Progressing)       Start:  07/26/24    Expected End:  08/16/24

## 2024-07-29 NOTE — CARE PLAN
The patient's goals for the shift include      The clinical goals for the shift include pt will remain hemodynamically stable    Over the shift, the patient did not make progress toward the following goals. Recommendations to address these barriers include Verbalize decreased shortness of breath this shift  Encourage/provide pulmonary hygiene/secretion clearance;Incentive spirometry;Suctioning.

## 2024-07-29 NOTE — NURSING NOTE
Patients transported to xray with RN at bedside. Patient on non rebreather sating at 97%. Patient tolerated well and patient able to ambulate to bed.

## 2024-07-29 NOTE — PROGRESS NOTES
"Respiratory Therapy Note  Pt was 93% on 35 LPM 50% sitting in chair. I replaced the vapotherm water bag it is now full. I.S. was in the room, I asked pt to show me how to use it and she took a very fast breath in and right out. I re educated pt on the correct method. \"Slow deep breath in after blowing all your air out. Keep the blue mike on the right in between the arrows for as long as you can. Perform 10 times every hour your awake.\" She did just that and had no questions when I left.   "

## 2024-07-29 NOTE — PROGRESS NOTES
Pulmonary Progress Note 07/29/24     FOLLOWUP FOR: Acute respiratory failure with hypoxia, COVID-19, asthma    SUBJECTIVE  Had a rough night she tells me.  Oxygen appears to have been increased.  Endorses an intermittently productive cough.  Breathing is fair.  Denies any chest pain      PHYSICAL EXAM        7/28/2024     7:28 AM 7/28/2024    11:26 AM 7/28/2024     4:30 PM 7/28/2024     9:17 PM 7/29/2024    12:50 AM 7/29/2024     5:10 AM 7/29/2024     8:14 AM   Vitals   Systolic 125  133 149 114 123 126   Diastolic 66  62 74 61 66 82   Heart Rate    87 58 64 87   Temp 36.4 °C (97.5 °F) 36.1 °C (97 °F) 36.2 °C (97.2 °F) 36.2 °C (97.2 °F)  35.8 °C (96.4 °F) 36.5 °C (97.7 °F)   Resp 24   19 19 22 25   Weight (lb)      157.19    BMI      28.75 kg/m2    BSA (m2)      1.77 m2        Intake/Output last 3 shifts:  I/O last 3 completed shifts:  In: 250 (3.5 mL/kg) [P.O.:250]  Out: 250 (3.5 mL/kg) [Urine:250 (0.1 mL/kg/hr)]  Weight: 71.3 kg   Intake/Output this shift:  I/O this shift:  In: 225 [P.O.:225]  Out: -       Physical Exam  Vitals reviewed.   Constitutional:       General: She is not in acute distress.     Interventions: Nasal cannula in place.      Comments: Vapotherm nasal cannula   HENT:      Head: Normocephalic and atraumatic.   Cardiovascular:      Rate and Rhythm: Normal rate and regular rhythm.   Pulmonary:      Effort: Pulmonary effort is normal.      Breath sounds: No wheezing or rhonchi.      Comments: Good air entry bilaterally, inspiratory squeaks noted bilaterally  Musculoskeletal:      Right lower leg: No edema.      Left lower leg: No edema.   Skin:     General: Skin is warm.   Neurological:      General: No focal deficit present.      Mental Status: She is alert.   Psychiatric:         Mood and Affect: Mood normal.         Behavior: Behavior normal.           Scheduled medications  aspirin, 81 mg, oral, Daily  budesonide, 0.25 mg, nebulization, Daily  dexAMETHasone, 6 mg, intravenous, q  AM  enoxaparin, 40 mg, subcutaneous, q24h  febuxostat, 80 mg, oral, Once per day on Monday Wednesday Friday  insulin regular, 0-5 Units, subcutaneous, With meals & nightly  ipratropium-albuteroL, 3 mL, nebulization, q6h  levothyroxine, 25 mcg, oral, Daily before breakfast  metoprolol tartrate, 25 mg, oral, BID  oxygen, , inhalation, q4h  sennosides-docusate sodium, 1 tablet, oral, Nightly  simvastatin, 40 mg, oral, q PM  triamterene-hydrochlorothiazid, 1 tablet, oral, Daily      Continuous medications     PRN medications  PRN medications: acetaminophen, albuterol, benzocaine-menthol, dextrose, dextrose, glucagon, glucagon, guaiFENesin, loperamide, melatonin, oxygen, polyethylene glycol     Labs:  Lab Results   Component Value Date     07/29/2024    K 3.4 07/29/2024     07/29/2024    CO2 26 07/29/2024    BUN 25 07/29/2024    CREATININE 0.80 07/29/2024    GLUCOSE 124 (H) 07/29/2024    CALCIUM 9.6 07/29/2024     Lab Results   Component Value Date    WBC 14.4 (H) 07/29/2024    HGB 13.9 07/29/2024    HCT 41.9 07/29/2024    MCV 86 07/29/2024     07/29/2024       Imaging:  XR chest 1 view    Result Date: 7/27/2024  Interpreted By:  Nichol Walton, STUDY: XR CHEST 1 VIEW;  7/27/2024 6:10 am   INDICATION: Signs/Symptoms:COVID pna.   COMPARISON: 07/25/2024   ACCESSION NUMBER(S): [id number]   ORDERING CLINICIAN: KALLIE ORO   TECHNIQUE: Portable AP upright   FINDINGS: Bilateral basilar atelectasis is similar to the prior study and partially obscures the cardiac silhouette. There is no obvious cardiomegaly. Left costophrenic angle is blunted consistent with minimal pleural fluid. No acute change is noted in the osseous structures.   1.4 cm eggshell calcification in the left upper quadrant of the abdomen is consistent with a calcified splenic artery aneurysm.       Bilateral basilar atelectasis and minimal left pleural effusion   MACRO: None.   Signed by: Nichol Walton 7/27/2024 9:14 AM Dictation  workstation:   [id number]    XR chest 1 view    Result Date: 7/25/2024  Interpreted By:  Eva Betancourt, STUDY: XR CHEST 1 VIEW 7/25/2024 9:03 am   INDICATION: Signs/Symptoms:monitoring progression. COVID pneumonia.   COMPARISON: 07/24/2024   ACCESSION NUMBER(S): [id number]   ORDERING CLINICIAN: CONRADO JUDGE   TECHNIQUE: AP erect view of the chest at bedside   FINDINGS: The cardiac size is indeterminate due to the AP projection and low lung volumes.   There is bibasilar atelectasis observed with possible small bilateral pleural effusions as well. Pulmonary vascularity is unremarkable.       Low lung volumes with bibasilar atelectasis and possible small bilateral pleural effusions.   Signed by: Eva Betancourt 7/25/2024 11:54 AM Dictation workstation:   [id number]    XR chest 1 view    Result Date: 7/24/2024  Interpreted By:  Yimi Negrete, STUDY: XR CHEST 1 VIEW;  7/24/2024 8:06 am   INDICATION: Signs/Symptoms:monitor bilateral consolidations 2/2 covid pna.   COMPARISON: Chest CT 07/20/2024 and chest radiograph 07/23/2024   ACCESSION NUMBER(S): [id number]   ORDERING CLINICIAN: CONRADO JUDGE   FINDINGS: AP radiograph of the chest was provided.   DEVICES: None.   CARDIOMEDIASTINAL SILHOUETTE: Cardiomediastinal silhouette is normal in size and configuration.Atherosclerotic calcifications of the aortic arch.   LUNGS: Bilateral low lung volumes. Stable airspace opacities in the lung bases. Mild interstitial thickening. No pneumothorax. Large pleural effusion.   BONES: No acute osseous changes.       1.  No significant interval change since 07/23/2024.     Signed by: Yimi Negrete 7/24/2024 8:37 AM Dictation workstation:   [id number]    XR chest 1 view    Result Date: 7/23/2024  Interpreted By:  Tarun Meza, STUDY: XR CHEST 1 VIEW; 7/23/2024 6:17 am   INDICATION: Signs/Symptoms:hypoxia 2/2 covid   COMPARISON: 07/20/2024   ACCESSION NUMBER(S): [id number]   ORDERING CLINICIAN: KALLIE ORO   FINDINGS: The  study is limited due to poor inspiratory effort, with resultant crowding of the pulmonary vasculature. The cardiomediastinal silhouette is within normal limits for the technique. There is interval significant improvement of bibasilar infiltrates/atelectatic changes, left more than right. There is no pneumothorax. The osseous structures are unremarkable.       Allowing for the aforementioned limitation, improving bibasilar infiltrates/atelectatic changes.   Signed by: Tarun Meza 7/23/2024 11:27 AM Dictation workstation:   [id number]    ECG 12 lead    Result Date: 7/22/2024  Sinus rhythm with Premature supraventricular complexes and with frequent and consecutive Premature ventricular complexes Left axis deviation Nonspecific T wave abnormality Abnormal ECG No previous ECGs available Confirmed by Saji Saenz ([id number]) on 7/22/2024 10:25:49 AM    CT angio chest for pulmonary embolism    Addendum Date: 7/21/2024    Interpreted By:  Tyra Jo, ADDENDUM: There is prominent narrowing of the trachea with anterior bowing noted. 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 Consider repeat evaluation as outpatient with inspiratory expiratory technique CT.   Signed by: Tyra Jo 7/21/2024 7:59 AM   -------- ORIGINAL REPORT -------- Dictation workstation:   [id number]    Result Date: 7/21/2024  Interpreted By:  Tyra Jo, STUDY: CT ANGIO CHEST FOR PULMONARY EMBOLISM;  7/20/2024 7:01 pm   INDICATION: Signs/Symptoms:tachy, hypoxic, recent travel.   COMPARISON: None   ACCESSION NUMBER(S): [id number]   ORDERING CLINICIAN: KATELYN SEGURA   TECHNIQUE: Helical data acquisition of the chest was obtained after intravenous administration of 75 ML Omnipaque 350 as per PE protocol. Images were reformatted in coronal and sagittal planes. Axial and coronal maximum intensity projection (MIP) images were created and reviewed.   FINDINGS: POTENTIAL LIMITATIONS OF THE STUDY: None   HEART AND VESSELS: There are  no discrete filling defects within main pulmonary artery and its branches to suggest acute pulmonary embolism. There is mild dilatation of the main pulmonary artery measuring 3.3 cm in diameter.   The thoracic aorta normal in course and caliber. No coronary artery calcifications are seen. Please note, the study is not optimized for evaluation of coronary arteries.   The cardiac chambers are not enlarged.   There is no pericardial effusion seen.   MEDIASTINUM AND MARK, LOWER NECK AND AXILLA: The visualized thyroid gland is within normal limits. No evidence of thoracic lymphadenopathy by CT criteria. Esophagus appears within normal limits as seen.   LUNGS AND AIRWAYS: The trachea and central airways are patent. No endobronchial lesion is seen.   Extensive pneumonic infiltrates are visualized involving the bilateral lower lobes with patchy opacities evident. Atelectatic changes of the lingula and right middle lobe.   UPPER ABDOMEN: Hypoattenuating lesions are noted in the right hepatic lobe that not well characterized on a single phase study, however likely represent cysts or hemangiomas.   CHEST WALL AND OSSEOUS STRUCTURES: Chest wall is within normal limits. No acute osseous pathology or suspicious osseous lesions.       1. No evidence of acute pulmonary embolism. 2. Mild dilation of the main pulmonary artery measuring 3.3 cm in diameter which can be associated with pulmonary hypertension. 3. Findings of extensive pneumonia in the bilateral lower lobes.   MACRO: None.   Signed by: Tyra Jo 7/20/2024 7:15 PM Dictation workstation:   [id number]    XR chest 2 views    Result Date: 7/20/2024  Interpreted By:  Elijah Gracia, STUDY: XR CHEST 2 VIEWS;  7/20/2024 6:34 pm   INDICATION: Signs/Symptoms:sob.   COMPARISON: None.   ACCESSION NUMBER(S): [id number]   ORDERING CLINICIAN: KATELYN SEGURA   FINDINGS:         CARDIOMEDIASTINAL SILHOUETTE: Cardiomediastinal silhouette is normal in size and configuration.    LUNGS: Dense airspace disease at the bases. Bilateral small effusions. There is perihilar vascular congestion as well   ABDOMEN: No remarkable upper abdominal findings.   BONES: No acute osseous changes.       Dense airspace disease at the bases with perihilar vascular congestion. A component of edema is present. Superimposed multifocal pneumonia is suspected.     MACRO: None   Signed by: Elijah Gracia 7/20/2024 7:07 PM Dictation workstation:   [id number]            Assessment/Plan   Principal Problem:    COVID-19      Acute respiratory failure with hypoxia  Continue with Vapotherm, increased oxygen requirements noted.  Suspect some in the setting of hypoventilation as with deep breathing her pulse ox does increase to 99%  Encourage incentive spirometry  Chest x-ray ordered by primary team which I would follow-up on  COVID-19 pneumonitis  Continue with Decadron  Will follow-up on chest x-ray  Completed a course it seems of ceftriaxone and azithromycin  Completed a course of 5 days of remdesivir  Asthma  Steroids as above  Continue with inhaled corticosteroid  Continue with short acting bronchodilators  PFTs as an outpatient  May end up needing LTAC disposition if unable to wean oxygen in the upcoming days     LOS: 9 days       Valarie Banuelos MD  Pulmonary/Critical Care medicine

## 2024-07-29 NOTE — SIGNIFICANT EVENT
Patient called for chest tightness, patient having coughing fit and not recovering on current HFNC settings and saturating only 82%. RT increased HFNC to 35L 80%, patient now saturating 90-91%

## 2024-07-30 LAB
ALBUMIN SERPL-MCNC: 3.1 G/DL (ref 3.5–5)
ANION GAP SERPL CALC-SCNC: 13 MMOL/L
BASOPHILS # BLD AUTO: 0.02 X10*3/UL (ref 0–0.1)
BASOPHILS NFR BLD AUTO: 0.2 %
BUN SERPL-MCNC: 29 MG/DL (ref 8–25)
CALCIUM SERPL-MCNC: 9.6 MG/DL (ref 8.5–10.4)
CHLORIDE SERPL-SCNC: 100 MMOL/L (ref 97–107)
CO2 SERPL-SCNC: 26 MMOL/L (ref 24–31)
CREAT SERPL-MCNC: 0.8 MG/DL (ref 0.4–1.6)
EGFRCR SERPLBLD CKD-EPI 2021: 76 ML/MIN/1.73M*2
EOSINOPHIL # BLD AUTO: 0.03 X10*3/UL (ref 0–0.4)
EOSINOPHIL NFR BLD AUTO: 0.2 %
ERYTHROCYTE [DISTWIDTH] IN BLOOD BY AUTOMATED COUNT: 13.9 % (ref 11.5–14.5)
GLUCOSE BLD MANUAL STRIP-MCNC: 121 MG/DL (ref 74–99)
GLUCOSE BLD MANUAL STRIP-MCNC: 152 MG/DL (ref 74–99)
GLUCOSE BLD MANUAL STRIP-MCNC: 229 MG/DL (ref 74–99)
GLUCOSE BLD MANUAL STRIP-MCNC: 279 MG/DL (ref 74–99)
GLUCOSE SERPL-MCNC: 134 MG/DL (ref 65–99)
HCT VFR BLD AUTO: 40.7 % (ref 36–46)
HGB BLD-MCNC: 13.6 G/DL (ref 12–16)
IMM GRANULOCYTES # BLD AUTO: 0.13 X10*3/UL (ref 0–0.5)
IMM GRANULOCYTES NFR BLD AUTO: 1 % (ref 0–0.9)
LYMPHOCYTES # BLD AUTO: 2.01 X10*3/UL (ref 0.8–3)
LYMPHOCYTES NFR BLD AUTO: 15.8 %
MAGNESIUM SERPL-MCNC: 1.7 MG/DL (ref 1.6–3.1)
MCH RBC QN AUTO: 28.8 PG (ref 26–34)
MCHC RBC AUTO-ENTMCNC: 33.4 G/DL (ref 32–36)
MCV RBC AUTO: 86 FL (ref 80–100)
MONOCYTES # BLD AUTO: 0.88 X10*3/UL (ref 0.05–0.8)
MONOCYTES NFR BLD AUTO: 6.9 %
NEUTROPHILS # BLD AUTO: 9.65 X10*3/UL (ref 1.6–5.5)
NEUTROPHILS NFR BLD AUTO: 75.9 %
NRBC BLD-RTO: 0 /100 WBCS (ref 0–0)
PHOSPHATE SERPL-MCNC: 3.8 MG/DL (ref 2.5–4.5)
PLATELET # BLD AUTO: 358 X10*3/UL (ref 150–450)
POTASSIUM SERPL-SCNC: 3 MMOL/L (ref 3.4–5.1)
RBC # BLD AUTO: 4.73 X10*6/UL (ref 4–5.2)
SODIUM SERPL-SCNC: 139 MMOL/L (ref 133–145)
WBC # BLD AUTO: 12.7 X10*3/UL (ref 4.4–11.3)

## 2024-07-30 PROCEDURE — 2500000002 HC RX 250 W HCPCS SELF ADMINISTERED DRUGS (ALT 637 FOR MEDICARE OP, ALT 636 FOR OP/ED)

## 2024-07-30 PROCEDURE — 94660 CPAP INITIATION&MGMT: CPT

## 2024-07-30 PROCEDURE — 94640 AIRWAY INHALATION TREATMENT: CPT

## 2024-07-30 PROCEDURE — 2500000004 HC RX 250 GENERAL PHARMACY W/ HCPCS (ALT 636 FOR OP/ED): Performed by: INTERNAL MEDICINE

## 2024-07-30 PROCEDURE — 2500000002 HC RX 250 W HCPCS SELF ADMINISTERED DRUGS (ALT 637 FOR MEDICARE OP, ALT 636 FOR OP/ED): Performed by: HOSPITALIST

## 2024-07-30 PROCEDURE — 2500000001 HC RX 250 WO HCPCS SELF ADMINISTERED DRUGS (ALT 637 FOR MEDICARE OP)

## 2024-07-30 PROCEDURE — 85025 COMPLETE CBC W/AUTO DIFF WBC: CPT

## 2024-07-30 PROCEDURE — 97530 THERAPEUTIC ACTIVITIES: CPT | Mod: GO,CO

## 2024-07-30 PROCEDURE — 97110 THERAPEUTIC EXERCISES: CPT | Mod: GP,CQ

## 2024-07-30 PROCEDURE — 36415 COLL VENOUS BLD VENIPUNCTURE: CPT

## 2024-07-30 PROCEDURE — 2060000001 HC INTERMEDIATE ICU ROOM DAILY

## 2024-07-30 PROCEDURE — 97116 GAIT TRAINING THERAPY: CPT | Mod: GP,CQ

## 2024-07-30 PROCEDURE — 2500000002 HC RX 250 W HCPCS SELF ADMINISTERED DRUGS (ALT 637 FOR MEDICARE OP, ALT 636 FOR OP/ED): Performed by: INTERNAL MEDICINE

## 2024-07-30 PROCEDURE — 97110 THERAPEUTIC EXERCISES: CPT | Mod: GO,CO

## 2024-07-30 PROCEDURE — 2500000005 HC RX 250 GENERAL PHARMACY W/O HCPCS: Performed by: HOSPITALIST

## 2024-07-30 PROCEDURE — 2500000004 HC RX 250 GENERAL PHARMACY W/ HCPCS (ALT 636 FOR OP/ED)

## 2024-07-30 PROCEDURE — 82947 ASSAY GLUCOSE BLOOD QUANT: CPT

## 2024-07-30 PROCEDURE — 83735 ASSAY OF MAGNESIUM: CPT

## 2024-07-30 PROCEDURE — 9420000001 HC RT PATIENT EDUCATION 5 MIN

## 2024-07-30 PROCEDURE — 80069 RENAL FUNCTION PANEL: CPT

## 2024-07-30 PROCEDURE — 94664 DEMO&/EVAL PT USE INHALER: CPT

## 2024-07-30 RX ORDER — FUROSEMIDE 10 MG/ML
20 INJECTION INTRAMUSCULAR; INTRAVENOUS ONCE
Status: COMPLETED | OUTPATIENT
Start: 2024-07-30 | End: 2024-07-30

## 2024-07-30 RX ORDER — POTASSIUM CHLORIDE 20 MEQ/1
20 TABLET, EXTENDED RELEASE ORAL
Status: COMPLETED | OUTPATIENT
Start: 2024-07-30 | End: 2024-07-30

## 2024-07-30 ASSESSMENT — COGNITIVE AND FUNCTIONAL STATUS - GENERAL
TOILETING: A LITTLE
HELP NEEDED FOR BATHING: A LITTLE
WALKING IN HOSPITAL ROOM: A LITTLE
MOBILITY SCORE: 18
TURNING FROM BACK TO SIDE WHILE IN FLAT BAD: A LITTLE
MOVING TO AND FROM BED TO CHAIR: A LITTLE
CLIMB 3 TO 5 STEPS WITH RAILING: A LITTLE
STANDING UP FROM CHAIR USING ARMS: A LITTLE
DRESSING REGULAR UPPER BODY CLOTHING: A LITTLE
MOVING FROM LYING ON BACK TO SITTING ON SIDE OF FLAT BED WITH BEDRAILS: A LITTLE
PERSONAL GROOMING: A LITTLE
STANDING UP FROM CHAIR USING ARMS: A LITTLE
MOBILITY SCORE: 18
WALKING IN HOSPITAL ROOM: A LITTLE
CLIMB 3 TO 5 STEPS WITH RAILING: A LITTLE
DAILY ACTIVITIY SCORE: 18
TURNING FROM BACK TO SIDE WHILE IN FLAT BAD: A LITTLE
EATING MEALS: A LITTLE
DRESSING REGULAR LOWER BODY CLOTHING: A LITTLE
DRESSING REGULAR UPPER BODY CLOTHING: A LITTLE
DRESSING REGULAR LOWER BODY CLOTHING: A LITTLE
DAILY ACTIVITIY SCORE: 18
HELP NEEDED FOR BATHING: A LITTLE
MOVING TO AND FROM BED TO CHAIR: A LITTLE
TOILETING: A LITTLE
EATING MEALS: A LITTLE
MOVING FROM LYING ON BACK TO SITTING ON SIDE OF FLAT BED WITH BEDRAILS: A LITTLE
PERSONAL GROOMING: A LITTLE

## 2024-07-30 ASSESSMENT — PAIN - FUNCTIONAL ASSESSMENT
PAIN_FUNCTIONAL_ASSESSMENT: 0-10

## 2024-07-30 ASSESSMENT — PAIN SCALES - GENERAL
PAINLEVEL_OUTOF10: 0 - NO PAIN

## 2024-07-30 NOTE — PROGRESS NOTES
Occupational Therapy    OT Treatment    Patient Name: Irma Baumann  MRN: [id number]  Today's Date: 7/30/2024  Time Calculation  Start Time: 1426  Stop Time: 1450  Time Calculation (min): 24 min        Assessment:  OT Assessment: Gradual progress made towards OT goals. Continue with current OT POC to increase strength, balance and functional tolerance to maximize safety and independence during ADLs.  Barriers to Discharge: Other (Comment) (O2 requirements)  Evaluation/Treatment Tolerance: Patient tolerated treatment well  End of Session Communication: Bedside nurse  End of Session Patient Position: Up in chair, Alarm off, not on at start of session (all needs in reach)  OT Assessment Results: Decreased ADL status, Decreased upper extremity strength, Decreased endurance, Decreased functional mobility, Decreased IADLs  Barriers to Discharge: Other (Comment) (O2 requirements)  Evaluation/Treatment Tolerance: Patient tolerated treatment well  Plan:  Treatment Interventions: ADL retraining, Functional transfer training, UE strengthening/ROM, Endurance training, Equipment evaluation/education, Patient/family training  OT Frequency: 4 times per week (Collaborated with OTR- pt would benefit from continued OT services x4week to maximize potential prior to discharge.)  OT Discharge Recommendations: Low intensity level of continued care  OT - OK to Discharge: Yes  Treatment Interventions: ADL retraining, Functional transfer training, UE strengthening/ROM, Endurance training, Equipment evaluation/education, Patient/family training    Subjective   Previous Visit Info:  OT Last Visit  OT Received On: 07/30/24  General:  General  Reason for Referral: impaired ADLs s/p Covid+  Past Medical History Relevant to Rehab: Gout, asthma, HLD, hypothyroidism, uterovaginal prolapse, OA, vit D deficiency  Prior to Session Communication: Bedside nurse  Patient Position Received: Up in chair, Alarm off, not on at start of session  General  Comment: Pt cleared for OT session per nursing, pleasant and eager to participate this date.  Precautions:  Hearing/Visual Limitations: glasses, [place]  Medical Precautions: Fall precautions, Oxygen therapy device and L/min, Infection precautions (HFNC 25L 50% FiO2)  Precautions Comment: COVID +  Vital Signs:  Vital Signs  Heart Rate: 73 ()  Heart Rate Source: Monitor  SpO2: 93 % (91-96%)  Patient Position: Sitting  Pain:  Pain Assessment  Pain Assessment: 0-10  0-10 (Numeric) Pain Score: 0 - No pain  Clinical Progression: Not changed    Objective    Cognition:  Cognition  Overall Cognitive Status: Within Functional Limits  Orientation Level: Oriented X4  Safety/Judgement: Within Functional Limits  Insight: Mild  Impulsive: Mildly  Task Initiation: WFL  Processing Speed: Within funtional limits  Coordination:  Movements are Fluid and Coordinated: Yes  Activities of Daily Living: Grooming  Grooming Comments: Pt pleasantly declined participation in ADLs at this time despite education and encouragement  Functional Standing Tolerance:  Time: >12min  Activity: Dynamic standing trials with close supervision  Functional Standing Tolerance Comments: Standing tasks completed this date with unilateral UE support and close supervision to increase functional tolerance, strength and challenge balance to maximize safety and independence during ADLs.  Bed Mobility/Transfers: Bed Mobility  Bed Mobility: No    Transfers  Transfer: Yes  Transfer 1  Trials/Comments 1: sit<>stands completed from standard chair height with close supervision- min verbal cues required for line management to increase safety and decrease risk of falls.    Standing Balance:  Dynamic Standing Balance  Dynamic Standing-Balance Support: Right upper extremity supported  Dynamic Standing-Balance: Lateral lean, Forward lean, Reaching for objects, Reaching across midline  Dynamic Standing-Comments: Fair+ standing balance observed with close supervision. Pt able  to unilaterally reach OBS with min displacement while maintaining balance.    Therapy/Activity: Therapeutic Exercise  Therapeutic Exercise Performed: Yes  Therapeutic Exercise Activity 1: BUE exercises completed 1x10 with min resistance for following exercises: wrist flexion/ extension, pronation/supination, bicep curl, triceps extension, and shoulder press  Therapeutic Exercise Activity 2: Exercises completed this date to increase strength and functional tolerance for safety and ease of ADL/ADL transfer completion. Verbal instruction and demonstration provided to ensure proper muscle recruitment. HEP handout provided and reviewed    Therapeutic Activity  Therapeutic Activity Performed: Yes  Therapeutic Activity 1: Repeated sit<>stand trials completed x10 without rest break to increase functional tolerance and strength to maximize safety and independence during functional tasks. Sit<>stands completed from standard chair height with close supervision.      Outcome Measures:[facility] Daily Activity  Putting on and taking off regular lower body clothing: A little  Bathing (including washing, rinsing, drying): A little  Putting on and taking off regular upper body clothing: A little  Toileting, which includes using toilet, bedpan or urinal: A little  Taking care of personal grooming such as brushing teeth: A little  Eating Meals: A little  Daily Activity - Total Score: 18        Education Documentation  ADL Training, taught by GISELLE Russo at 7/30/2024  5:20 PM.  Learner: Patient  Readiness: Acceptance  Method: Explanation, Demonstration, Handout  Response: Verbalizes Understanding, Demonstrated Understanding, Needs Reinforcement  Comment: [facility] HEP handout provided and reviewed    Education Comments  Education provided on role of OT/POC, safety awareness throughout functional tasks/transfers, importance of activity/ rest routine, EC/WS techniques, and use of call light for assistance. Questions, comments and  concerns addressed regarding OT.      Goals:  Encounter Problems       Encounter Problems (Active)       ADLs       Pt will complete ADL tasks at mod I with use of AE prn  (Progressing)       Start:  07/26/24    Expected End:  08/16/24               Functional Mobility       Pt will perform functional mobility household distances at mod I with use of LRAD.    (Progressing)       Start:  07/26/24    Expected End:  08/16/24               Instrumental Activities of Daily Living       Pt will perform simple IADLs/retrieval of items at mod I.   (Progressing)       Start:  07/26/24    Expected End:  08/16/24               OT Transfers       Pt will perform functional transfers at mod I.   (Progressing)       Start:  07/26/24    Expected End:  08/16/24

## 2024-07-30 NOTE — PROGRESS NOTES
Physical Therapy    Physical Therapy Treatment    Patient Name: Irma Baumann  MRN: [id number]  Today's Date: 7/30/2024  Time Calculation  Start Time: 1035  Stop Time: 1100  Time Calculation (min): 25 min    Assessment/Plan   PT Assessment  End of Session Communication: Bedside nurse  End of Session Patient Position: Up in chair, Alarm off, not on at start of session  PT Plan  Inpatient/Swing Bed or Outpatient: Inpatient  PT Plan  Treatment/Interventions: Bed mobility, Transfer training, Gait training, Balance training, Strengthening, Endurance training, Therapeutic exercise, Therapeutic activity  PT Plan: Ongoing PT  PT Frequency: 5 times per week  PT Discharge Recommendations: Low intensity level of continued care  Equipment Recommended upon Discharge: Other (comment) (none vs cane)  PT Recommended Transfer Status: Contact guard  PT - OK to Discharge: Yes      General Visit Information:   PT  Visit  PT Received On: 07/30/24  General  Prior to Session Communication: Bedside nurse  Patient Position Received: Up in chair, Alarm off, not on at start of session  General Comment: Cleared by nursing to be seen for therapy, pt agreeable with tx, seated in reclining chair upon arrival.    Subjective   Precautions:  Precautions  Precautions Comment: COVID +  Vital Signs:  Vital Signs  Heart Rate: 89 ()  SpO2: 94 % (HFNC 25/50)    Objective   Pain:  Pain Assessment  Pain Assessment: 0-10  0-10 (Numeric) Pain Score: 0 - No pain  Cognition:  Cognition  Overall Cognitive Status: Within Functional Limits     Postural Control:  Static Sitting Balance  Static Sitting-Balance Support: Feet supported  Static Sitting-Level of Assistance: Independent  Static Sitting-Comment/Number of Minutes: Good seated static balance.  Static Standing Balance  Static Standing-Balance Support: Right upper extremity supported  Static Standing-Level of Assistance: Close supervision  Static Standing-Comment/Number of Minutes: Fair+ static  standing balance,    Treatments:  Therapeutic Exercise  Therapeutic Exercise Performed: Yes  Therapeutic Exercise Activity 1: Standing heel raises x10  Therapeutic Exercise Activity 2: Standing hip flexion x10  Therapeutic Exercise Activity 3: Standing marches x10  Therapeutic Exercise Activity 4: Standing mini squat x10    Ambulation/Gait Training  Ambulation/Gait Training Performed: Yes  Ambulation/Gait Training 1  Surface 1: Level tile  Device 1: No device  Assistance 1: Close supervision  Quality of Gait 1: Narrow base of support  Comments/Distance (ft) 1: Few forward/backward steps without AD.    Transfers  Transfer: Yes  Transfer 1  Transfer From 1: Sit to  Transfer to 1: Stand  Technique 1: Sit to stand, Stand to sit  Transfer Level of Assistance 1: Close supervision    Outcome Measures:  [facility] Basic Mobility  Turning from your back to your side while in a flat bed without using bedrails: A little  Moving from lying on your back to sitting on the side of a flat bed without using bedrails: A little  Moving to and from bed to chair (including a wheelchair): A little  Standing up from a chair using your arms (e.g. wheelchair or bedside chair): A little  To walk in hospital room: A little  Climbing 3-5 steps with railing: A little  Basic Mobility - Total Score: 18      Encounter Problems       Encounter Problems (Active)       PT Problem       Patient will demonstrate improvements in strength  (Progressing)       Start:  07/26/24    Expected End:  08/23/24            Patient will transfer supine <> sit independently  (Progressing)       Start:  07/26/24    Expected End:  08/23/24            Patient will transfer sit <> stand independently (Progressing)       Start:  07/26/24    Expected End:  08/23/24            Patient will ambulate 150 ft independently   (Progressing)       Start:  07/26/24    Expected End:  08/23/24               Pain - Adult

## 2024-07-30 NOTE — PROGRESS NOTES
Irma Baumann is a 79 y.o. female on day 10 of admission presenting with COVID-19.      Subjective treating for COVID with acute respiratory failure.  She is already finished antibiotics for pneumonia.  She is finished antivirals.  She is still on dexamethasone.  We gave her little Lasix yesterday for fluid overload.  It is looking like perhaps her O2 requirements might be coming down.    recorded Vitals  /74 (BP Location: Right arm, Patient Position: Sitting)   Pulse 74   Temp 36.5 °C (97.7 °F) (Temporal)   Resp 17   Wt 70.3 kg (154 lb 15.7 oz)   SpO2 95%   Intake/Output last 3 Shifts:    Intake/Output Summary (Last 24 hours) at 7/30/2024 1627  Last data filed at 7/30/2024 0930  Gross per 24 hour   Intake 360 ml   Output 625 ml   Net -265 ml       Admission Weight  Weight: 72.6 kg (160 lb) (07/20/24 1725)    Daily Weight  07/30/24 : 70.3 kg (154 lb 15.7 oz)    Image Results  XR chest 2 views  Narrative: Interpreted By:  Chana Mehta,   STUDY:  XR CHEST 2 VIEWS 7/29/2024 9:50 am      INDICATION:  Signs/Symptoms:productive cough, bacterial pneumonia, covid      COMPARISON:  07/27/2024      ACCESSION NUMBER(S):  [id number]      ORDERING CLINICIAN:  ANTWON SKELTON      TECHNIQUE:  PA and lateral views of the chest      FINDINGS:  Cardiomediastinal silhouette is within normal limits. Examination  rotated accentuating the cardiac silhouette. There is left basilar  atelectasis/infiltrate. Also, component of mild right basilar  atelectasis. Lung fields are hypo inflated with basilar  bronchovascular crowding. Mild multilevel anterior osteophyte  formation within the thoracic spine with multilevel endplate  sclerosis demonstrated. No compression deformity.                          Impression: 1. Left basilar atelectasis/infiltrate. Also, component of right  basilar atelectasis demonstrated.      Signed by: Chana Mehta 7/29/2024 12:28 PM  Dictation workstation:   [id number]      Physical  Exam  Alert oriented undistressed  Heart regular rate and rhythm  Lungs faint diffuse crackles without wheezing  Abdomen soft nontender nondistended  Extremities 2+ pitting bilateral leg edema       Assessment/Plan      #1-COVID with acute respiratory failure.  Pulmonary says that perhaps tomorrow we can try her on a regular nasal cannula as opposed to the high flow..  Can have her get another dose of Lasix like we did yesterday.  She seems to have enough edema to justify it..  Her potassium was low this morning.  She is gotten a total of 60 mill equivalents of potassium today.  Will recheck tomorrow.  If we can start to wean her oxygen weekend back burner any talks of LTAC            Wojciech Mendoza MD

## 2024-07-30 NOTE — PROGRESS NOTES
Pulmonary Progress Note 07/30/24     FOLLOWUP FOR: Acute respiratory failure with hypoxia, COVID-19, asthma    SUBJECTIVE  Much better night.  Remains on high flow with de-escalating requirements.  Currently on 50%  Endorses a nonproductive cough  Denies any chest pain      PHYSICAL EXAM        7/29/2024     3:21 PM 7/29/2024     8:34 PM 7/30/2024    12:52 AM 7/30/2024     4:44 AM 7/30/2024     7:39 AM 7/30/2024     8:45 AM 7/30/2024    10:35 AM   Vitals   Systolic 124 134 134 129 140     Diastolic 60 69 70 67 77     Heart Rate  69 73 76 89  89   Temp 36.5 °C (97.7 °F) 36.9 °C (98.4 °F) 35.7 °C (96.3 °F) 36 °C (96.8 °F)  35 °C (95 °F)    Resp  22 18 17 22     Weight (lb)    154.98      BMI    28.35 kg/m2      BSA (m2)    1.75 m2          Intake/Output last 3 shifts:  I/O last 3 completed shifts:  In: 500 (7.1 mL/kg) [P.O.:500]  Out: 875 (12.4 mL/kg) [Urine:875 (0.3 mL/kg/hr)]  Weight: 70.3 kg   Intake/Output this shift:  I/O this shift:  In: 360 [P.O.:360]  Out: -       Physical Exam  Vitals reviewed.   Constitutional:       General: She is not in acute distress.     Interventions: Nasal cannula in place.      Comments: Vapotherm nasal cannula   HENT:      Head: Normocephalic and atraumatic.   Cardiovascular:      Rate and Rhythm: Normal rate and regular rhythm.   Pulmonary:      Effort: Pulmonary effort is normal.      Breath sounds: No wheezing or rhonchi.      Comments: Good air entry bilaterally, inspiratory squeaks noted bilaterally  Musculoskeletal:      Right lower leg: No edema.      Left lower leg: No edema.   Skin:     General: Skin is warm.   Neurological:      General: No focal deficit present.      Mental Status: She is alert.   Psychiatric:         Mood and Affect: Mood normal.         Behavior: Behavior normal.           Scheduled medications  aspirin, 81 mg, oral, Daily  budesonide, 0.25 mg, nebulization, Daily  enoxaparin, 40 mg, subcutaneous, q24h  febuxostat, 80 mg, oral, Once per day  on Monday Wednesday Friday  insulin regular, 0-5 Units, subcutaneous, With meals & nightly  ipratropium-albuteroL, 3 mL, nebulization, q6h  levothyroxine, 25 mcg, oral, Daily before breakfast  metoprolol tartrate, 25 mg, oral, BID  oxygen, , inhalation, q4h  potassium chloride CR, 20 mEq, oral, q2h  sennosides-docusate sodium, 1 tablet, oral, Nightly  simvastatin, 40 mg, oral, q PM  triamterene-hydrochlorothiazid, 1 tablet, oral, Daily      Continuous medications     PRN medications  PRN medications: acetaminophen, albuterol, benzocaine-menthol, dextrose, dextrose, glucagon, glucagon, guaiFENesin, loperamide, melatonin, oxygen, polyethylene glycol     Labs:  Lab Results   Component Value Date     07/30/2024    K 3.0 (L) 07/30/2024     07/30/2024    CO2 26 07/30/2024    BUN 29 (H) 07/30/2024    CREATININE 0.80 07/30/2024    GLUCOSE 134 (H) 07/30/2024    CALCIUM 9.6 07/30/2024     Lab Results   Component Value Date    WBC 12.7 (H) 07/30/2024    HGB 13.6 07/30/2024    HCT 40.7 07/30/2024    MCV 86 07/30/2024     07/30/2024       Imaging:  XR chest 1 view    Result Date: 7/27/2024  Interpreted By:  Nichol Walton, STUDY: XR CHEST 1 VIEW;  7/27/2024 6:10 am   INDICATION: Signs/Symptoms:COVID pna.   COMPARISON: 07/25/2024   ACCESSION NUMBER(S): [id number]   ORDERING CLINICIAN: KALLIE ORO   TECHNIQUE: Portable AP upright   FINDINGS: Bilateral basilar atelectasis is similar to the prior study and partially obscures the cardiac silhouette. There is no obvious cardiomegaly. Left costophrenic angle is blunted consistent with minimal pleural fluid. No acute change is noted in the osseous structures.   1.4 cm eggshell calcification in the left upper quadrant of the abdomen is consistent with a calcified splenic artery aneurysm.       Bilateral basilar atelectasis and minimal left pleural effusion   MACRO: None.   Signed by: Nichol Walton 7/27/2024 9:14 AM Dictation workstation:   [id number]    XR  chest 1 view    Result Date: 7/25/2024  Interpreted By:  Eva Betancourt, STUDY: XR CHEST 1 VIEW 7/25/2024 9:03 am   INDICATION: Signs/Symptoms:monitoring progression. COVID pneumonia.   COMPARISON: 07/24/2024   ACCESSION NUMBER(S): [id number]   ORDERING CLINICIAN: CONRADO JUDGE   TECHNIQUE: AP erect view of the chest at bedside   FINDINGS: The cardiac size is indeterminate due to the AP projection and low lung volumes.   There is bibasilar atelectasis observed with possible small bilateral pleural effusions as well. Pulmonary vascularity is unremarkable.       Low lung volumes with bibasilar atelectasis and possible small bilateral pleural effusions.   Signed by: Eva Betancourt 7/25/2024 11:54 AM Dictation workstation:   [id number]    XR chest 1 view    Result Date: 7/24/2024  Interpreted By:  Yimi Negrete, STUDY: XR CHEST 1 VIEW;  7/24/2024 8:06 am   INDICATION: Signs/Symptoms:monitor bilateral consolidations 2/2 covid pna.   COMPARISON: Chest CT 07/20/2024 and chest radiograph 07/23/2024   ACCESSION NUMBER(S): [id number]   ORDERING CLINICIAN: CONRADO JUDGE   FINDINGS: AP radiograph of the chest was provided.   DEVICES: None.   CARDIOMEDIASTINAL SILHOUETTE: Cardiomediastinal silhouette is normal in size and configuration.Atherosclerotic calcifications of the aortic arch.   LUNGS: Bilateral low lung volumes. Stable airspace opacities in the lung bases. Mild interstitial thickening. No pneumothorax. Large pleural effusion.   BONES: No acute osseous changes.       1.  No significant interval change since 07/23/2024.     Signed by: Yimi Negrete 7/24/2024 8:37 AM Dictation workstation:   [id number]    XR chest 1 view    Result Date: 7/23/2024  Interpreted By:  Tarun Meza, STUDY: XR CHEST 1 VIEW; 7/23/2024 6:17 am   INDICATION: Signs/Symptoms:hypoxia 2/2 covid   COMPARISON: 07/20/2024   ACCESSION NUMBER(S): [id number]   ORDERING CLINICIAN: KALLIE ORO   FINDINGS: The study is limited due to poor  inspiratory effort, with resultant crowding of the pulmonary vasculature. The cardiomediastinal silhouette is within normal limits for the technique. There is interval significant improvement of bibasilar infiltrates/atelectatic changes, left more than right. There is no pneumothorax. The osseous structures are unremarkable.       Allowing for the aforementioned limitation, improving bibasilar infiltrates/atelectatic changes.   Signed by: Tarun Meza 7/23/2024 11:27 AM Dictation workstation:   [id number]    ECG 12 lead    Result Date: 7/22/2024  Sinus rhythm with Premature supraventricular complexes and with frequent and consecutive Premature ventricular complexes Left axis deviation Nonspecific T wave abnormality Abnormal ECG No previous ECGs available Confirmed by Saji Saenz ([id number]) on 7/22/2024 10:25:49 AM    CT angio chest for pulmonary embolism    Addendum Date: 7/21/2024    Interpreted By:  Tyra Jo, ADDENDUM: There is prominent narrowing of the trachea with anterior bowing noted. 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 Consider repeat evaluation as outpatient with inspiratory expiratory technique CT.   Signed by: Tyra Jo 7/21/2024 7:59 AM   -------- ORIGINAL REPORT -------- Dictation workstation:   [id number]    Result Date: 7/21/2024  Interpreted By:  Tyra Jo, STUDY: CT ANGIO CHEST FOR PULMONARY EMBOLISM;  7/20/2024 7:01 pm   INDICATION: Signs/Symptoms:tachy, hypoxic, recent travel.   COMPARISON: None   ACCESSION NUMBER(S): [id number]   ORDERING CLINICIAN: KATELYN SEGURA   TECHNIQUE: Helical data acquisition of the chest was obtained after intravenous administration of 75 ML Omnipaque 350 as per PE protocol. Images were reformatted in coronal and sagittal planes. Axial and coronal maximum intensity projection (MIP) images were created and reviewed.   FINDINGS: POTENTIAL LIMITATIONS OF THE STUDY: None   HEART AND VESSELS: There are no discrete filling defects  within main pulmonary artery and its branches to suggest acute pulmonary embolism. There is mild dilatation of the main pulmonary artery measuring 3.3 cm in diameter.   The thoracic aorta normal in course and caliber. No coronary artery calcifications are seen. Please note, the study is not optimized for evaluation of coronary arteries.   The cardiac chambers are not enlarged.   There is no pericardial effusion seen.   MEDIASTINUM AND MARK, LOWER NECK AND AXILLA: The visualized thyroid gland is within normal limits. No evidence of thoracic lymphadenopathy by CT criteria. Esophagus appears within normal limits as seen.   LUNGS AND AIRWAYS: The trachea and central airways are patent. No endobronchial lesion is seen.   Extensive pneumonic infiltrates are visualized involving the bilateral lower lobes with patchy opacities evident. Atelectatic changes of the lingula and right middle lobe.   UPPER ABDOMEN: Hypoattenuating lesions are noted in the right hepatic lobe that not well characterized on a single phase study, however likely represent cysts or hemangiomas.   CHEST WALL AND OSSEOUS STRUCTURES: Chest wall is within normal limits. No acute osseous pathology or suspicious osseous lesions.       1. No evidence of acute pulmonary embolism. 2. Mild dilation of the main pulmonary artery measuring 3.3 cm in diameter which can be associated with pulmonary hypertension. 3. Findings of extensive pneumonia in the bilateral lower lobes.   MACRO: None.   Signed by: Tyra Jo 7/20/2024 7:15 PM Dictation workstation:   [id number]    XR chest 2 views    Result Date: 7/20/2024  Interpreted By:  Elijah Gracia, STUDY: XR CHEST 2 VIEWS;  7/20/2024 6:34 pm   INDICATION: Signs/Symptoms:sob.   COMPARISON: None.   ACCESSION NUMBER(S): [id number]   ORDERING CLINICIAN: KATELYN SEGURA   FINDINGS:         CARDIOMEDIASTINAL SILHOUETTE: Cardiomediastinal silhouette is normal in size and configuration.   LUNGS: Dense airspace disease  at the bases. Bilateral small effusions. There is perihilar vascular congestion as well   ABDOMEN: No remarkable upper abdominal findings.   BONES: No acute osseous changes.       Dense airspace disease at the bases with perihilar vascular congestion. A component of edema is present. Superimposed multifocal pneumonia is suspected.     MACRO: None   Signed by: Elijah Gracia 7/20/2024 7:07 PM Dictation workstation:   [id number]            Assessment/Plan   Principal Problem:    COVID-19      Acute respiratory failure with hypoxia  Improving oxygen requirements.  If continues on 50% and 20 L/min, will trial nasal cannula in the upcoming 24 hours  Encourage incentive spirometry.  This is essential as I did review her x-ray yesterday which shows basilar atelectatic changes which may be at the heart of her oxygen debt  COVID-19 pneumonitis  Continue with Decadron  Completed antibiotics course as well as remdesivir  Asthma  Steroids as above  Continue with inhaled corticosteroid  Continue with short acting bronchodilators  PFTs as an outpatient  Trend oxygen.  Avoid significant volume expansion.     LOS: 10 days       Valarie Banuelos MD  Pulmonary/Critical Care medicine

## 2024-07-30 NOTE — CARE PLAN
Phoned pts  Gus Baumann at [phone] and discussed discharge planning. Pt is in ISO for COVID, here 10 days; unable to wean off of high flow 02.  Discussed LTACH with Gus who is okay with the plan. Gave different locations for LTACH; Gus does not have an email account and he cannot make it to the hospital. He said that his son is not in this area  Gus gave permission for this care coordination to place a referral to [facility] on [address] in [place] [phone]  Referral placed and waiting for response from [facility]  16:39 Per Margo Schwartz; pt meets criteria for LTACH and they are going to start the precert.    DISCHARGE PLAN: TBD--PT MAY NEED TO GO TO LTACH--REFERRAL PLACED TO [facility]. DO NOT DISCHARGE PATIENT BEFORE SPEAKING WITH CARE COORDINATION. NEED LTACH TO ACCEPT PT.

## 2024-07-30 NOTE — CONSULTS
"Nutrition Assessement Note    Nutrition Assessment    Reason for Assessment: Length of stay    Reason for Hospital Admission:  Irma Baumann is a 79 y.o. female who is admitted for SOB, Covid19+. Patient remains in Covid isolation. Still on 50% HFNC. Attempted to reach patient by phone without success. PO intake adequate per records. Records indicate 6# weight loss since December 2023, possibly intentional-unable to confirm.    Past Medical History:   Diagnosis Date    Personal history of other diseases of the circulatory system     History of hypertension    Personal history of other diseases of the respiratory system     History of asthma      Past Surgical History:   Procedure Laterality Date    OTHER SURGICAL HISTORY  11/30/2022    Hysterectomy    OTHER SURGICAL HISTORY  11/30/2022    Sinus surgery       Nutrition History:  Food and Nutrient History: Records indicate % intake most meals  Energy Intake: Good > 75 %  Food Allergies/Intolerances:  None  GI Symptoms: None  Oral Problems: None    Anthropometrics:  Ht: 157.5 cm (5' 2\"), Wt: 70.3 kg (154 lb 15.7 oz), BMI: 28.34  IBW/kg (Dietitian Calculated): 50 kg  Percent of IBW: 140 %     Weight Change:  Daily Weight  07/30/24 : 70.3 kg (154 lb 15.7 oz)  05/14/24 : 71.7 kg (158 lb)  12/19/23 : 72.6 kg (160 lb)  06/19/23 : 72.1 kg (159 lb)  11/30/22 : 68.9 kg (152 lb)  06/13/22 : 74.4 kg (164 lb)  06/25/21 : 75.3 kg (166 lb)  04/15/21 : 75.3 kg (166 lb)  10/08/20 : 75.3 kg (166 lb)     Weight History / % Weight Change: Records show 6# weight loss in past 8 months  Significant Weight Loss: No     Nutrition Focused Physical Exam Findings: defer: Covid19 isolation     Nutrition Significant Labs:  Lab Results   Component Value Date    WBC 12.7 (H) 07/30/2024    HGB 13.6 07/30/2024    HCT 40.7 07/30/2024     07/30/2024    CHOL 122 (L) 05/10/2024    TRIG 131 05/10/2024    HDL 42.0 (L) 05/10/2024    ALT 27 07/20/2024    AST 35 07/20/2024     07/30/2024 "    K 3.0 (L) 07/30/2024     07/30/2024    CREATININE 0.80 07/30/2024    BUN 29 (H) 07/30/2024    CO2 26 07/30/2024    TSH 4.12 05/10/2024    HGBA1C 5.9 (H) 05/10/2024     Nutrition Specific Medications:  aspirin, 81 mg, oral, Daily  budesonide, 0.25 mg, nebulization, Daily  enoxaparin, 40 mg, subcutaneous, q24h  febuxostat, 80 mg, oral, Once per day on Monday Wednesday Friday  insulin regular, 0-5 Units, subcutaneous, With meals & nightly  ipratropium-albuteroL, 3 mL, nebulization, q6h  levothyroxine, 25 mcg, oral, Daily before breakfast  metoprolol tartrate, 25 mg, oral, BID  oxygen, , inhalation, q4h  potassium chloride CR, 20 mEq, oral, q2h  sennosides-docusate sodium, 1 tablet, oral, Nightly  simvastatin, 40 mg, oral, q PM  triamterene-hydrochlorothiazid, 1 tablet, oral, Daily         Dietary Orders (From admission, onward)       Start     Ordered    07/21/24 0039  Adult diet Regular, Cardiac; 70 gm fat; 2 - 3 grams Sodium  Diet effective now        Question Answer Comment   Diet type Regular    Diet type Cardiac    Fat restriction: 70 gm fat    Sodium restriction: 2 - 3 grams Sodium        07/21/24 0038    07/21/24 0039  Oral nutritional supplements  Until discontinued        Question Answer Comment   Deliver with All meals    Select supplement: Ensure High Protein        07/21/24 0038    07/21/24 0039  Oral nutritional supplements  Until discontinued        Question Answer Comment   Deliver with Lunch    Deliver with Dinner    Select supplement: Magic Cup        07/21/24 0038                   Estimated Needs:   Estimated Energy Needs  Total Energy Estimated Needs (kCal):  (7556-2811)  Total Estimated Energy Need per Day (kCal/kg):  (25-28)  Method for Estimating Needs: Actual Wt    Estimated Protein Needs  Total Protein Estimated Needs (g):  (70-84)  Total Protein Estimated Needs (g/kg):  (1-1.2)  Method for Estimating Needs: Actual Wt    Estimated Fluid Needs  Total Fluid Estimated Needs (mL): 4250  mL  Total Fluid Estimated Needs (mL/kg): 25 mL/kg  Method for Estimating Needs: Actual Wt      Nutrition Diagnosis   Nutrition Diagnosis:     Nutrition Diagnosis  Patient has Nutrition Diagnosis: No     Nutrition Interventions/Recommendations   Nutrition Interventions and Recommendations:    Nutrition Prescription:  Individualized Nutrition Prescription Provided for : 3692-9717 calories, 70-84 gm protein to be provided via diet    Nutrition Interventions:   Food and/or Nutrient Delivery Interventions  Interventions: Meals and snacks  Meals and Snacks: Fat-modified diet, Mineral-modified diet  Goal: provide as ordered    Education Documentation  No documentation found.         Nutrition Monitoring and Evaluation   Monitoring/Evaluation:   Food/Nutrient Related History Monitoring  Monitoring and Evaluation Plan: Energy intake  Energy Intake: Estimated energy intake  Criteria: pt to consume >/= 75% estimated needs       Time Spent/Follow-up:   Follow Up  Time Spent (min): 30 minutes  Last Date of Nutrition Visit: 07/30/24  Nutrition Follow-Up Needed?: 7-10 days  Follow up Comment: 8/6/24

## 2024-07-31 PROBLEM — U07.1 ACUTE RESPIRATORY FAILURE DUE TO COVID-19 (MULTI): Status: ACTIVE | Noted: 2024-07-31

## 2024-07-31 PROBLEM — J96.00 ACUTE RESPIRATORY FAILURE DUE TO COVID-19 (MULTI): Status: ACTIVE | Noted: 2024-07-31

## 2024-07-31 LAB
ALBUMIN SERPL-MCNC: 3.2 G/DL (ref 3.5–5)
ANION GAP SERPL CALC-SCNC: 11 MMOL/L
BASOPHILS # BLD AUTO: 0.03 X10*3/UL (ref 0–0.1)
BASOPHILS NFR BLD AUTO: 0.2 %
BUN SERPL-MCNC: 32 MG/DL (ref 8–25)
CALCIUM SERPL-MCNC: 9.6 MG/DL (ref 8.5–10.4)
CHLORIDE SERPL-SCNC: 99 MMOL/L (ref 97–107)
CO2 SERPL-SCNC: 28 MMOL/L (ref 24–31)
CREAT SERPL-MCNC: 0.8 MG/DL (ref 0.4–1.6)
EGFRCR SERPLBLD CKD-EPI 2021: 76 ML/MIN/1.73M*2
EOSINOPHIL # BLD AUTO: 0.03 X10*3/UL (ref 0–0.4)
EOSINOPHIL NFR BLD AUTO: 0.2 %
ERYTHROCYTE [DISTWIDTH] IN BLOOD BY AUTOMATED COUNT: 13.8 % (ref 11.5–14.5)
GLUCOSE BLD MANUAL STRIP-MCNC: 115 MG/DL (ref 74–99)
GLUCOSE BLD MANUAL STRIP-MCNC: 145 MG/DL (ref 74–99)
GLUCOSE BLD MANUAL STRIP-MCNC: 159 MG/DL (ref 74–99)
GLUCOSE BLD MANUAL STRIP-MCNC: 176 MG/DL (ref 74–99)
GLUCOSE SERPL-MCNC: 126 MG/DL (ref 65–99)
HCT VFR BLD AUTO: 42.5 % (ref 36–46)
HGB BLD-MCNC: 14.1 G/DL (ref 12–16)
IMM GRANULOCYTES # BLD AUTO: 0.23 X10*3/UL (ref 0–0.5)
IMM GRANULOCYTES NFR BLD AUTO: 1.4 % (ref 0–0.9)
LYMPHOCYTES # BLD AUTO: 2.07 X10*3/UL (ref 0.8–3)
LYMPHOCYTES NFR BLD AUTO: 12.4 %
MAGNESIUM SERPL-MCNC: 1.8 MG/DL (ref 1.6–3.1)
MCH RBC QN AUTO: 28.4 PG (ref 26–34)
MCHC RBC AUTO-ENTMCNC: 33.2 G/DL (ref 32–36)
MCV RBC AUTO: 86 FL (ref 80–100)
MONOCYTES # BLD AUTO: 1.24 X10*3/UL (ref 0.05–0.8)
MONOCYTES NFR BLD AUTO: 7.4 %
NEUTROPHILS # BLD AUTO: 13.06 X10*3/UL (ref 1.6–5.5)
NEUTROPHILS NFR BLD AUTO: 78.4 %
NRBC BLD-RTO: 0 /100 WBCS (ref 0–0)
PHOSPHATE SERPL-MCNC: 3.3 MG/DL (ref 2.5–4.5)
PLATELET # BLD AUTO: 357 X10*3/UL (ref 150–450)
POTASSIUM SERPL-SCNC: 3.3 MMOL/L (ref 3.4–5.1)
RBC # BLD AUTO: 4.97 X10*6/UL (ref 4–5.2)
SODIUM SERPL-SCNC: 138 MMOL/L (ref 133–145)
WBC # BLD AUTO: 16.7 X10*3/UL (ref 4.4–11.3)

## 2024-07-31 PROCEDURE — 94640 AIRWAY INHALATION TREATMENT: CPT

## 2024-07-31 PROCEDURE — 2500000002 HC RX 250 W HCPCS SELF ADMINISTERED DRUGS (ALT 637 FOR MEDICARE OP, ALT 636 FOR OP/ED): Performed by: INTERNAL MEDICINE

## 2024-07-31 PROCEDURE — 2500000001 HC RX 250 WO HCPCS SELF ADMINISTERED DRUGS (ALT 637 FOR MEDICARE OP)

## 2024-07-31 PROCEDURE — 82947 ASSAY GLUCOSE BLOOD QUANT: CPT

## 2024-07-31 PROCEDURE — 97110 THERAPEUTIC EXERCISES: CPT | Mod: GP

## 2024-07-31 PROCEDURE — 80069 RENAL FUNCTION PANEL: CPT

## 2024-07-31 PROCEDURE — 83735 ASSAY OF MAGNESIUM: CPT

## 2024-07-31 PROCEDURE — 85025 COMPLETE CBC W/AUTO DIFF WBC: CPT

## 2024-07-31 PROCEDURE — 2500000004 HC RX 250 GENERAL PHARMACY W/ HCPCS (ALT 636 FOR OP/ED)

## 2024-07-31 PROCEDURE — 2500000001 HC RX 250 WO HCPCS SELF ADMINISTERED DRUGS (ALT 637 FOR MEDICARE OP): Performed by: INTERNAL MEDICINE

## 2024-07-31 PROCEDURE — 9420000001 HC RT PATIENT EDUCATION 5 MIN

## 2024-07-31 PROCEDURE — 2500000005 HC RX 250 GENERAL PHARMACY W/O HCPCS: Performed by: HOSPITALIST

## 2024-07-31 PROCEDURE — 36415 COLL VENOUS BLD VENIPUNCTURE: CPT

## 2024-07-31 PROCEDURE — 97530 THERAPEUTIC ACTIVITIES: CPT | Mod: GP

## 2024-07-31 PROCEDURE — 2060000001 HC INTERMEDIATE ICU ROOM DAILY

## 2024-07-31 PROCEDURE — 94660 CPAP INITIATION&MGMT: CPT

## 2024-07-31 PROCEDURE — 2500000002 HC RX 250 W HCPCS SELF ADMINISTERED DRUGS (ALT 637 FOR MEDICARE OP, ALT 636 FOR OP/ED)

## 2024-07-31 PROCEDURE — 2500000002 HC RX 250 W HCPCS SELF ADMINISTERED DRUGS (ALT 637 FOR MEDICARE OP, ALT 636 FOR OP/ED): Performed by: HOSPITALIST

## 2024-07-31 RX ORDER — HYDROCODONE BITARTRATE AND HOMATROPINE METHYLBROMIDE ORAL SOLUTION 5; 1.5 MG/5ML; MG/5ML
5 LIQUID ORAL EVERY 6 HOURS PRN
Status: DISCONTINUED | OUTPATIENT
Start: 2024-07-31 | End: 2024-08-02 | Stop reason: HOSPADM

## 2024-07-31 RX ORDER — POTASSIUM CHLORIDE 20 MEQ/1
20 TABLET, EXTENDED RELEASE ORAL EVERY 4 HOURS
Status: DISPENSED | OUTPATIENT
Start: 2024-07-31 | End: 2024-07-31

## 2024-07-31 ASSESSMENT — PAIN - FUNCTIONAL ASSESSMENT
PAIN_FUNCTIONAL_ASSESSMENT: 0-10
PAIN_FUNCTIONAL_ASSESSMENT: 0-10

## 2024-07-31 ASSESSMENT — COGNITIVE AND FUNCTIONAL STATUS - GENERAL
MOVING TO AND FROM BED TO CHAIR: A LITTLE
CLIMB 3 TO 5 STEPS WITH RAILING: A LITTLE
MOVING TO AND FROM BED TO CHAIR: A LITTLE
STANDING UP FROM CHAIR USING ARMS: A LITTLE
TOILETING: A LITTLE
MOBILITY SCORE: 20
CLIMB 3 TO 5 STEPS WITH RAILING: A LITTLE
WALKING IN HOSPITAL ROOM: A LITTLE
MOBILITY SCORE: 20
WALKING IN HOSPITAL ROOM: A LITTLE
DAILY ACTIVITIY SCORE: 23
STANDING UP FROM CHAIR USING ARMS: A LITTLE

## 2024-07-31 ASSESSMENT — PAIN SCALES - GENERAL
PAINLEVEL_OUTOF10: 0 - NO PAIN
PAINLEVEL_OUTOF10: 0 - NO PAIN

## 2024-07-31 NOTE — CARE PLAN
The patient's goals for the shift include      The clinical goals for the shift include improve oxygen sat and go home      Problem: Respiratory  Goal: No signs of respiratory distress (eg. Use of accessory muscles. Peds grunting)  7/31/2024 1242 by Rachael Covarrubias, RN  Outcome: Progressing  7/31/2024 1239 by Rachael Covrarubias, RN  Outcome: Progressing  Goal: Verbalize decreased shortness of breath this shift  7/31/2024 1242 by Rachael Covarrubias, RN  Outcome: Progressing  7/31/2024 1239 by Rachael Covarrubias, RN  Outcome: Progressing  Goal: Wean oxygen to maintain O2 saturation per order/standard this shift  7/31/2024 1242 by Rachael Covarrubias, RN  Outcome: Progressing  7/31/2024 1239 by Rachael Covarrubias, RN  Outcome: Progressing     Problem: Skin  Goal: Decreased wound size/increased tissue granulation at next dressing change  7/31/2024 1242 by Rachael Covarrubias, RN  Outcome: Progressing  7/31/2024 1239 by Rachael Covarrubias, RN  Outcome: Progressing  Goal: Participates in plan/prevention/treatment measures  7/31/2024 1242 by Rachael Covarrubias, RN  Outcome: Progressing  7/31/2024 1239 by Rachael Covarrubias, RN  Outcome: Progressing  Goal: Prevent/manage excess moisture  7/31/2024 1242 by Rachael Covarrubias, RN  Outcome: Progressing  7/31/2024 1239 by Rachael Covarrubias, RN  Outcome: Progressing  Goal: Prevent/minimize sheer/friction injuries  7/31/2024 1242 by Rachael Covarrubias, RN  Outcome: Progressing  7/31/2024 1239 by Rachael Covarrubias, RN  Outcome: Progressing  Goal: Promote/optimize nutrition  7/31/2024 1242 by Rachael Covarrubias, RN  Outcome: Progressing  7/31/2024 1239 by Rachael Covarrubias, RN  Outcome: Progressing  Goal: Promote skin healing  7/31/2024 1242 by Rachael Covarrubias, RN  Outcome: Progressing  7/31/2024 1239 by Rachael Covarrubias, RN  Outcome: Progressing     Problem: Pain  Goal: Takes deep breaths with  improved pain control throughout the shift  7/31/2024 1242 by Rachael Covarrubias, RN  Outcome: Progressing  7/31/2024 1239 by Rachael Covarrubias, RN  Outcome: Progressing  Goal: Turns in bed with improved pain control throughout the shift  7/31/2024 1242 by Rachael Covarrubias, RN  Outcome: Progressing  7/31/2024 1239 by Rachael Covarrubias, RN  Outcome: Progressing  Goal: Walks with improved pain control throughout the shift  7/31/2024 1242 by Rachael Covarrubias, RN  Outcome: Progressing  7/31/2024 1239 by Rachael Covarrubias, RN  Outcome: Progressing  Goal: Performs ADL's with improved pain control throughout shift  7/31/2024 1242 by Rachael Covarrubias, RN  Outcome: Progressing  7/31/2024 1239 by Rachael Covarrubias, RN  Outcome: Progressing  Goal: Participates in PT with improved pain control throughout the shift  7/31/2024 1242 by Rachael Covarrubias, RN  Outcome: Progressing  7/31/2024 1239 by Rachael Covarrubias, RN  Outcome: Progressing  Goal: Free from opioid side effects throughout the shift  7/31/2024 1242 by Rachael Covarrubias, RN  Outcome: Progressing  7/31/2024 1239 by Rachael Covarrubias, RN  Outcome: Progressing  Goal: Free from acute confusion related to pain meds throughout the shift  7/31/2024 1242 by Rachael Covarrubias, RN  Outcome: Progressing  7/31/2024 1239 by Rachael Covarrubias, RN  Outcome: Progressing     Problem: Pain - Adult  Goal: Verbalizes/displays adequate comfort level or baseline comfort level  7/31/2024 1242 by Rachael Covarrubias, RN  Outcome: Progressing  7/31/2024 1239 by Rachael Covarrubias, RN  Outcome: Progressing     Problem: Safety - Adult  Goal: Free from fall injury  7/31/2024 1242 by Rachael Covarrubias, RN  Outcome: Progressing  7/31/2024 1239 by Rachael Covarrubias, RN  Outcome: Progressing     Problem: Discharge Planning  Goal: Discharge to home or other facility with appropriate resources  7/31/2024 1242 by  Rachael Covarrubias RN  Outcome: Progressing  7/31/2024 1239 by Rachael Covarrubias RN  Outcome: Progressing     Problem: Chronic Conditions and Co-morbidities  Goal: Patient's chronic conditions and co-morbidity symptoms are monitored and maintained or improved  7/31/2024 1242 by Rachael Covarrubias RN  Outcome: Progressing  7/31/2024 1239 by Rachael Covarrubias RN  Outcome: Progressing     Problem: ADLs  Goal: Pt will complete ADL tasks at mod I with use of AE prn   7/31/2024 1242 by Rachael Covarrubias RN  Outcome: Progressing  7/31/2024 1239 by Rachael Covarrubias RN  Outcome: Progressing     Problem: Instrumental Activities of Daily Living  Goal: Pt will perform simple IADLs/retrieval of items at mod I.    7/31/2024 1242 by Rachael Covarrubias RN  Outcome: Progressing  7/31/2024 1239 by Rachael Covarrubias RN  Outcome: Progressing

## 2024-07-31 NOTE — CARE PLAN
The patient's goals for the shift include      The clinical goals for the shift include improve oxygen sat and go home      Problem: Respiratory  Goal: No signs of respiratory distress (eg. Use of accessory muscles. Peds grunting)  Outcome: Progressing  Goal: Verbalize decreased shortness of breath this shift  Outcome: Progressing  Goal: Wean oxygen to maintain O2 saturation per order/standard this shift  Outcome: Progressing     Problem: Skin  Goal: Decreased wound size/increased tissue granulation at next dressing change  Outcome: Progressing  Goal: Participates in plan/prevention/treatment measures  Outcome: Progressing  Goal: Prevent/manage excess moisture  Outcome: Progressing  Goal: Prevent/minimize sheer/friction injuries  Outcome: Progressing  Goal: Promote/optimize nutrition  Outcome: Progressing  Goal: Promote skin healing  Outcome: Progressing     Problem: Pain  Goal: Takes deep breaths with improved pain control throughout the shift  Outcome: Progressing  Goal: Turns in bed with improved pain control throughout the shift  Outcome: Progressing  Goal: Walks with improved pain control throughout the shift  Outcome: Progressing  Goal: Performs ADL's with improved pain control throughout shift  Outcome: Progressing  Goal: Participates in PT with improved pain control throughout the shift  Outcome: Progressing  Goal: Free from opioid side effects throughout the shift  Outcome: Progressing  Goal: Free from acute confusion related to pain meds throughout the shift  Outcome: Progressing     Problem: Pain - Adult  Goal: Verbalizes/displays adequate comfort level or baseline comfort level  Outcome: Progressing     Problem: Safety - Adult  Goal: Free from fall injury  Outcome: Progressing     Problem: Discharge Planning  Goal: Discharge to home or other facility with appropriate resources  Outcome: Progressing     Problem: Chronic Conditions and Co-morbidities  Goal: Patient's chronic conditions and co-morbidity  symptoms are monitored and maintained or improved  Outcome: Progressing     Problem: ADLs  Goal: Pt will complete ADL tasks at mod I with use of AE prn   Outcome: Progressing     Problem: Instrumental Activities of Daily Living  Goal: Pt will perform simple IADLs/retrieval of items at mod I.    Outcome: Progressing

## 2024-07-31 NOTE — PROGRESS NOTES
Pulmonary Progress Note 07/31/24     FOLLOWUP FOR: Acute respiratory failure with hypoxia, COVID-19, asthma    SUBJECTIVE  Had episode of shortness of breath while on the Vapotherm.  Took a while to recover.  She is not sure if related to inability to clear phlegm versus sinus drainage.  Feels better now.      PHYSICAL EXAM        7/30/2024     2:26 PM 7/30/2024     3:56 PM 7/30/2024     8:30 PM 7/30/2024    11:58 PM 7/31/2024     3:55 AM 7/31/2024     4:13 AM 7/31/2024     7:30 AM   Vitals   Systolic  132 125  132  144   Diastolic  86 80  82  84   Heart Rate 73 75 91 59 80  78   Temp  36.5 °C (97.7 °F) 36.8 °C (98.2 °F)  36.2 °C (97.2 °F)  35 °C (95 °F)   Resp  18 28 16 21  20   Weight (lb)      154.54    BMI      28.27 kg/m2    BSA (m2)      1.75 m2        Intake/Output last 3 shifts:  I/O last 3 completed shifts:  In: 360 (5.1 mL/kg) [P.O.:360]  Out: 1325 (18.9 mL/kg) [Urine:1325 (0.5 mL/kg/hr)]  Weight: 70.1 kg   Intake/Output this shift:  No intake/output data recorded.      Physical Exam  Vitals reviewed.   Constitutional:       General: She is not in acute distress.     Interventions: Nasal cannula in place.   HENT:      Head: Normocephalic and atraumatic.   Cardiovascular:      Rate and Rhythm: Normal rate and regular rhythm.   Pulmonary:      Effort: Pulmonary effort is normal.      Breath sounds: No wheezing or rhonchi.      Comments: Reasonable air entry,  Musculoskeletal:      Right lower leg: No edema.      Left lower leg: No edema.   Skin:     General: Skin is warm.   Neurological:      General: No focal deficit present.      Mental Status: She is alert.   Psychiatric:         Mood and Affect: Mood normal.         Behavior: Behavior normal.           Scheduled medications  aspirin, 81 mg, oral, Daily  budesonide, 0.25 mg, nebulization, Daily  enoxaparin, 40 mg, subcutaneous, q24h  febuxostat, 80 mg, oral, Once per day on Monday Wednesday Friday  insulin regular, 0-5 Units, subcutaneous,  With meals & nightly  ipratropium-albuteroL, 3 mL, nebulization, q6h  levothyroxine, 25 mcg, oral, Daily before breakfast  metoprolol tartrate, 25 mg, oral, BID  oxygen, , inhalation, q4h  potassium chloride CR, 20 mEq, oral, q4h  sennosides-docusate sodium, 1 tablet, oral, Nightly  simvastatin, 40 mg, oral, q PM  triamterene-hydrochlorothiazid, 1 tablet, oral, Daily      Continuous medications     PRN medications  PRN medications: acetaminophen, albuterol, benzocaine-menthol, dextrose, dextrose, glucagon, glucagon, guaiFENesin, hydrocodone-homatropine, loperamide, melatonin, oxygen, polyethylene glycol     Labs:  Lab Results   Component Value Date     07/31/2024    K 3.3 (L) 07/31/2024    CL 99 07/31/2024    CO2 28 07/31/2024    BUN 32 (H) 07/31/2024    CREATININE 0.80 07/31/2024    GLUCOSE 126 (H) 07/31/2024    CALCIUM 9.6 07/31/2024     Lab Results   Component Value Date    WBC 16.7 (H) 07/31/2024    HGB 14.1 07/31/2024    HCT 42.5 07/31/2024    MCV 86 07/31/2024     07/31/2024       Imaging:  XR chest 1 view    Result Date: 7/27/2024  Interpreted By:  Nichol Walton, STUDY: XR CHEST 1 VIEW;  7/27/2024 6:10 am   INDICATION: Signs/Symptoms:COVID pna.   COMPARISON: 07/25/2024   ACCESSION NUMBER(S): [id number]   ORDERING CLINICIAN: KALLIE ORO   TECHNIQUE: Portable AP upright   FINDINGS: Bilateral basilar atelectasis is similar to the prior study and partially obscures the cardiac silhouette. There is no obvious cardiomegaly. Left costophrenic angle is blunted consistent with minimal pleural fluid. No acute change is noted in the osseous structures.   1.4 cm eggshell calcification in the left upper quadrant of the abdomen is consistent with a calcified splenic artery aneurysm.       Bilateral basilar atelectasis and minimal left pleural effusion   MACRO: None.   Signed by: Nichol Walton 7/27/2024 9:14 AM Dictation workstation:   [id number]    XR chest 1 view    Result Date:  7/25/2024  Interpreted By:  Eva Betancourt, STUDY: XR CHEST 1 VIEW 7/25/2024 9:03 am   INDICATION: Signs/Symptoms:monitoring progression. COVID pneumonia.   COMPARISON: 07/24/2024   ACCESSION NUMBER(S): [id number]   ORDERING CLINICIAN: CONRADO JUDGE   TECHNIQUE: AP erect view of the chest at bedside   FINDINGS: The cardiac size is indeterminate due to the AP projection and low lung volumes.   There is bibasilar atelectasis observed with possible small bilateral pleural effusions as well. Pulmonary vascularity is unremarkable.       Low lung volumes with bibasilar atelectasis and possible small bilateral pleural effusions.   Signed by: Eva Betancourt 7/25/2024 11:54 AM Dictation workstation:   [id number]    XR chest 1 view    Result Date: 7/24/2024  Interpreted By:  Yimi Negrete, STUDY: XR CHEST 1 VIEW;  7/24/2024 8:06 am   INDICATION: Signs/Symptoms:monitor bilateral consolidations 2/2 covid pna.   COMPARISON: Chest CT 07/20/2024 and chest radiograph 07/23/2024   ACCESSION NUMBER(S): [id number]   ORDERING CLINICIAN: CONRADO JUDGE   FINDINGS: AP radiograph of the chest was provided.   DEVICES: None.   CARDIOMEDIASTINAL SILHOUETTE: Cardiomediastinal silhouette is normal in size and configuration.Atherosclerotic calcifications of the aortic arch.   LUNGS: Bilateral low lung volumes. Stable airspace opacities in the lung bases. Mild interstitial thickening. No pneumothorax. Large pleural effusion.   BONES: No acute osseous changes.       1.  No significant interval change since 07/23/2024.     Signed by: Yimi Negrete 7/24/2024 8:37 AM Dictation workstation:   [id number]    XR chest 1 view    Result Date: 7/23/2024  Interpreted By:  Tarun Meza, STUDY: XR CHEST 1 VIEW; 7/23/2024 6:17 am   INDICATION: Signs/Symptoms:hypoxia 2/2 covid   COMPARISON: 07/20/2024   ACCESSION NUMBER(S): [id number]   ORDERING CLINICIAN: KALLIE ORO   FINDINGS: The study is limited due to poor inspiratory effort, with resultant  crowding of the pulmonary vasculature. The cardiomediastinal silhouette is within normal limits for the technique. There is interval significant improvement of bibasilar infiltrates/atelectatic changes, left more than right. There is no pneumothorax. The osseous structures are unremarkable.       Allowing for the aforementioned limitation, improving bibasilar infiltrates/atelectatic changes.   Signed by: Tarun Meza 7/23/2024 11:27 AM Dictation workstation:   [id number]    ECG 12 lead    Result Date: 7/22/2024  Sinus rhythm with Premature supraventricular complexes and with frequent and consecutive Premature ventricular complexes Left axis deviation Nonspecific T wave abnormality Abnormal ECG No previous ECGs available Confirmed by Saji Saenz ([id number]) on 7/22/2024 10:25:49 AM    CT angio chest for pulmonary embolism    Addendum Date: 7/21/2024    Interpreted By:  Tyra Jo, ADDENDUM: There is prominent narrowing of the trachea with anterior bowing noted. 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 Consider repeat evaluation as outpatient with inspiratory expiratory technique CT.   Signed by: Tyra Jo 7/21/2024 7:59 AM   -------- ORIGINAL REPORT -------- Dictation workstation:   [id number]    Result Date: 7/21/2024  Interpreted By:  Tyra Jo, STUDY: CT ANGIO CHEST FOR PULMONARY EMBOLISM;  7/20/2024 7:01 pm   INDICATION: Signs/Symptoms:tachy, hypoxic, recent travel.   COMPARISON: None   ACCESSION NUMBER(S): [id number]   ORDERING CLINICIAN: KATELYN SEGURA   TECHNIQUE: Helical data acquisition of the chest was obtained after intravenous administration of 75 ML Omnipaque 350 as per PE protocol. Images were reformatted in coronal and sagittal planes. Axial and coronal maximum intensity projection (MIP) images were created and reviewed.   FINDINGS: POTENTIAL LIMITATIONS OF THE STUDY: None   HEART AND VESSELS: There are no discrete filling defects within main pulmonary artery and its  branches to suggest acute pulmonary embolism. There is mild dilatation of the main pulmonary artery measuring 3.3 cm in diameter.   The thoracic aorta normal in course and caliber. No coronary artery calcifications are seen. Please note, the study is not optimized for evaluation of coronary arteries.   The cardiac chambers are not enlarged.   There is no pericardial effusion seen.   MEDIASTINUM AND MARK, LOWER NECK AND AXILLA: The visualized thyroid gland is within normal limits. No evidence of thoracic lymphadenopathy by CT criteria. Esophagus appears within normal limits as seen.   LUNGS AND AIRWAYS: The trachea and central airways are patent. No endobronchial lesion is seen.   Extensive pneumonic infiltrates are visualized involving the bilateral lower lobes with patchy opacities evident. Atelectatic changes of the lingula and right middle lobe.   UPPER ABDOMEN: Hypoattenuating lesions are noted in the right hepatic lobe that not well characterized on a single phase study, however likely represent cysts or hemangiomas.   CHEST WALL AND OSSEOUS STRUCTURES: Chest wall is within normal limits. No acute osseous pathology or suspicious osseous lesions.       1. No evidence of acute pulmonary embolism. 2. Mild dilation of the main pulmonary artery measuring 3.3 cm in diameter which can be associated with pulmonary hypertension. 3. Findings of extensive pneumonia in the bilateral lower lobes.   MACRO: None.   Signed by: Tyra Jo 7/20/2024 7:15 PM Dictation workstation:   [id number]    XR chest 2 views    Result Date: 7/20/2024  Interpreted By:  Elijah Gracia, STUDY: XR CHEST 2 VIEWS;  7/20/2024 6:34 pm   INDICATION: Signs/Symptoms:sob.   COMPARISON: None.   ACCESSION NUMBER(S): [id number]   ORDERING CLINICIAN: KATELYN SEGURA   FINDINGS:         CARDIOMEDIASTINAL SILHOUETTE: Cardiomediastinal silhouette is normal in size and configuration.   LUNGS: Dense airspace disease at the bases. Bilateral small  effusions. There is perihilar vascular congestion as well   ABDOMEN: No remarkable upper abdominal findings.   BONES: No acute osseous changes.       Dense airspace disease at the bases with perihilar vascular congestion. A component of edema is present. Superimposed multifocal pneumonia is suspected.     MACRO: None   Signed by: Elijah Gracia 7/20/2024 7:07 PM Dictation workstation:   [id number]            Assessment/Plan   Principal Problem:    COVID-19  Active Problems:    Acute respiratory failure due to COVID-19 (Multi)      Acute respiratory failure with hypoxia  With still recommend trial of nasal cannula to assess how she does.  If does not tolerate would recommend replacing on mini high flow  Incentive spirometry is essential  COVID-19 pneumonitis  Decadron x 10 days  Completed antibiotics course as well as remdesivir  Asthma  Steroids as above  Continue with inhaled corticosteroid  Continue with short acting bronchodilators  PFTs as an outpatient  Her COVID-19 positive test result was 7/20/2024.  She had severe COVID and would recommend 21 days contact/droplet isolation from positive test.  Patient may come out of contact/droplet isolation at midnight on 8/8 from our perspective     LOS: 11 days       Valarie Banuelos MD  Pulmonary/Critical Care medicine

## 2024-07-31 NOTE — PROGRESS NOTES
Irma Baumann is a 79 y.o. female on day 11 of admission presenting with COVID-19.      Subjective   Has been here for 11 days with acute respiratory failure related to COVID-19.  She has basically finished all of her courses of medical treatments for this condition and she is still on 50% high flow.  She is having trouble sleeping but part of that is because someone told her to sleep in a prone position and she does not like that.       Objective   Alert oriented undistressed  Heart regular rate and rhythm  Lungs diffuse rhonchi  Abdomen soft nontender nondistended  Extremities plus edema  I watched the patient stand up and go from chair to toilet.  Her pulse ox went from low 90s to upper 80s while on the 50% high flow.  It took her about 5 minutes of rest for the pulse ox to go back up into the 90s.  Last Recorded Vitals  /84 (BP Location: Left arm, Patient Position: Lying)   Pulse 78   Temp 35 °C (95 °F) (Temporal)   Resp 20   Wt 70.1 kg (154 lb 8.7 oz)   SpO2 (!) 89%   Intake/Output last 3 Shifts:    Intake/Output Summary (Last 24 hours) at 7/31/2024 0957  Last data filed at 7/30/2024 1841  Gross per 24 hour   Intake --   Output 450 ml   Net -450 ml       Admission Weight  Weight: 72.6 kg (160 lb) (07/20/24 1725)    Daily Weight  07/31/24 : 70.1 kg (154 lb 8.7 oz)    Image Results  XR chest 2 views  Narrative: Interpreted By:  Chana Mehta,   STUDY:  XR CHEST 2 VIEWS 7/29/2024 9:50 am      INDICATION:  Signs/Symptoms:productive cough, bacterial pneumonia, covid      COMPARISON:  07/27/2024      ACCESSION NUMBER(S):  [id number]      ORDERING CLINICIAN:  ANTWON SKELTON      TECHNIQUE:  PA and lateral views of the chest      FINDINGS:  Cardiomediastinal silhouette is within normal limits. Examination  rotated accentuating the cardiac silhouette. There is left basilar  atelectasis/infiltrate. Also, component of mild right basilar  atelectasis. Lung fields are hypo inflated with  basilar  bronchovascular crowding. Mild multilevel anterior osteophyte  formation within the thoracic spine with multilevel endplate  sclerosis demonstrated. No compression deformity.                          Impression: 1. Left basilar atelectasis/infiltrate. Also, component of right  basilar atelectasis demonstrated.      Signed by: Chana Mehta 7/29/2024 12:28 PM  Dictation workstation:   [id number]      Physical Exam    Relevant Results               Assessment/Plan        Principal Problem:    COVID-19  Active Problems:    Acute respiratory failure due to COVID-19 (Multi)         She has completed all her medical treatments including antivirals steroids antibiotics.  Today is day 11.  She still on a 50% high flow.  She will be seen by pulmonary today but I think it is time to commit to LTAC.  Case management is already been working on this.  She will need a total of 21 days of isolation starting on July 20.  I discussed all of this with Dr. Marcus the patient and case management         Wojciech Mendoza MD

## 2024-07-31 NOTE — NURSING NOTE
Assumed care of pt, pt is awake in the chair, pt is in iso, on Hflow 3L/50%, shift report given by PERRY Herrera, bed locked and lowered, call light w/in reach.

## 2024-07-31 NOTE — PROGRESS NOTES
Physical Therapy    Physical Therapy Treatment    Patient Name: Irma Baumann  MRN: [id number]  Today's Date: 7/31/2024  Time Calculation  Start Time: 1456  Stop Time: 1520  Time Calculation (min): 24 min    Assessment/Plan   PT Assessment  PT Assessment Results: Decreased strength, Decreased endurance, Impaired balance, Decreased mobility  Rehab Prognosis: Good  Evaluation/Treatment Tolerance: Patient tolerated treatment well  End of Session Communication: Bedside nurse  Assessment Comment: pt progressing well; pt is SUPERVISION for mobility; educated and instructed pt in energy conservation techniques. *pt reported getting OOB and ambulating back and forth to C and recliner chair throughout the day without assistance. educated pt to continue pacing self and moving about. pt agreed.  End of Session Patient Position: Bed, 2 rail up (call button in reach; pt requested to take a nap.)  PT Plan  Inpatient/Swing Bed or Outpatient: Inpatient  PT Plan  Treatment/Interventions: Bed mobility, Transfer training, Gait training, Balance training, Strengthening, Endurance training, Therapeutic exercise, Therapeutic activity  PT Plan: Ongoing PT  PT Frequency: 3 times per week  PT Discharge Recommendations: Low intensity level of continued care  Equipment Recommended upon Discharge: Other (comment) (none vs cane)  PT Recommended Transfer Status:  (SUPERVISION)  PT - OK to Discharge: Yes      General Visit Information:   PT  Visit  PT Received On: 07/31/24  General  Reason for Referral: pt admitted with COVID; impaired mobilty  Referred By: Daniel Desai PA-C  Past Medical History Relevant to Rehab: Gout, asthma, HLD, hypothyroidism, uterovaginal prolapse, OA, vit D deficiency  Prior to Session Communication: Bedside nurse  Patient Position Received: Bed, 2 rail up  General Comment: pt alert and cooperative; pt reported feeling better today just sleepy; pt reported sitting up in recliner chair earlier today.    Subjective    Precautions:  Precautions  Medical Precautions: Fall precautions, Oxygen therapy device and L/min, Infection precautions  Precautions Comment: + COVID; educated and instructed pt in energy conservation techniques (especially pacing self with activities and pursed lip breathing)    Vital Signs:  Vital Signs  Heart Rate: 86  Resp:  (22-24 bpm)  SpO2:  (92-94% wearing 6L of oxygen)    Objective   Pain:  Pain Assessment  Pain Assessment:  (0/10)    Cognition:  Cognition  Overall Cognitive Status: Within Functional Limits  Orientation Level: Oriented X4    Coordination:  Coordination Comment: short B step length    Postural Control:  Postural Control  Posture Comment: forward head and rounded shoulders    Activity Tolerance:  Activity Tolerance  Endurance:  (mild fatigue during mobility;)    Treatments:    Therapeutic Exercise Performed:  B LE AROM therex sitting on EOB: AP, LAQ, ABD and hip flexion x 10 reps; VC given to slow down pace; frequent rest breaks given      Bed Mobility:  supine <-> sit via rolling using bed rail for support with MOD INDEPENDENT    Ambulation/Gait Training Performed:  pt amb 6' x 2 without assistive device and DIST SUPERVISION; pt appeared to take short guarded steps. no balance deficits noted. pt sat on BSC to take a break; then returned to EOB.    Transfer:  sit <->stand with Close SUPERVISION; slow to stand. GOOD- eccentric control  during stand to sit       Outcome Measures:  [facility] Basic Mobility  Turning from your back to your side while in a flat bed without using bedrails: None  Moving from lying on your back to sitting on the side of a flat bed without using bedrails: None  Moving to and from bed to chair (including a wheelchair): A little  Standing up from a chair using your arms (e.g. wheelchair or bedside chair): A little  To walk in hospital room: A little  Climbing 3-5 steps with railing: A little  Basic Mobility - Total Score: 20         Encounter Problems       Encounter  Problems (Active)       PT Problem       Patient will demonstrate improvements in strength  (Progressing)       Start:  07/26/24    Expected End:  08/23/24            Patient will transfer supine <> sit independently  (Met)       Start:  07/26/24    Expected End:  08/23/24            Patient will transfer sit <> stand independently (Progressing)       Start:  07/26/24    Expected End:  08/23/24            Patient will ambulate 150 ft independently   (Progressing)       Start:  07/26/24    Expected End:  08/23/24               Pain - Adult

## 2024-07-31 NOTE — CARE PLAN
The patient's goals for the shift include      The clinical goals for the shift include Improvement in oxygenation    Over the shift, the patient did not make progress toward the following goals. Barriers to progression include   Problem: Respiratory  Goal: No signs of respiratory distress (eg. Use of accessory muscles. Peds grunting)  Outcome: Progressing  Goal: Verbalize decreased shortness of breath this shift  Outcome: Progressing  Goal: Wean oxygen to maintain O2 saturation per order/standard this shift  Outcome: Progressing     Problem: Skin  Goal: Decreased wound size/increased tissue granulation at next dressing change  Outcome: Progressing  Flowsheets (Taken 7/30/2024 2243)  Decreased wound size/increased tissue granulation at next dressing change: Promote sleep for wound healing  Goal: Participates in plan/prevention/treatment measures  Outcome: Progressing  Flowsheets (Taken 7/30/2024 2243)  Participates in plan/prevention/treatment measures: Elevate heels  Goal: Prevent/manage excess moisture  Outcome: Progressing  Flowsheets (Taken 7/30/2024 2243)  Prevent/manage excess moisture: Monitor for/manage infection if present  Goal: Prevent/minimize sheer/friction injuries  Outcome: Progressing  Flowsheets (Taken 7/30/2024 2243)  Prevent/minimize sheer/friction injuries: Use pull sheet  Goal: Promote/optimize nutrition  Outcome: Progressing  Flowsheets (Taken 7/30/2024 2243)  Promote/optimize nutrition: Monitor/record intake including meals  Goal: Promote skin healing  Outcome: Progressing     Problem: Pain  Goal: Takes deep breaths with improved pain control throughout the shift  Outcome: Progressing  Goal: Turns in bed with improved pain control throughout the shift  Outcome: Progressing  Goal: Walks with improved pain control throughout the shift  Outcome: Progressing  Goal: Performs ADL's with improved pain control throughout shift  Outcome: Progressing  Goal: Participates in PT with improved pain control  throughout the shift  Outcome: Progressing  Goal: Free from opioid side effects throughout the shift  Outcome: Progressing  Goal: Free from acute confusion related to pain meds throughout the shift  Outcome: Progressing     Problem: Pain - Adult  Goal: Verbalizes/displays adequate comfort level or baseline comfort level  Outcome: Progressing     Problem: Safety - Adult  Goal: Free from fall injury  Outcome: Progressing     Problem: Discharge Planning  Goal: Discharge to home or other facility with appropriate resources  Outcome: Progressing     Problem: Chronic Conditions and Co-morbidities  Goal: Patient's chronic conditions and co-morbidity symptoms are monitored and maintained or improved  Outcome: Progressing     Problem: ADLs  Goal: Pt will complete ADL tasks at mod I with use of AE prn   Outcome: Progressing     Problem: Instrumental Activities of Daily Living  Goal: Pt will perform simple IADLs/retrieval of items at mod I.    Outcome: Progressing   . Recommendations to address these barriers include .

## 2024-08-01 LAB
ALBUMIN SERPL-MCNC: 3 G/DL (ref 3.5–5)
ANION GAP SERPL CALC-SCNC: 13 MMOL/L
BASOPHILS # BLD AUTO: 0.03 X10*3/UL (ref 0–0.1)
BASOPHILS NFR BLD AUTO: 0.3 %
BUN SERPL-MCNC: 31 MG/DL (ref 8–25)
CALCIUM SERPL-MCNC: 9.4 MG/DL (ref 8.5–10.4)
CHLORIDE SERPL-SCNC: 101 MMOL/L (ref 97–107)
CO2 SERPL-SCNC: 27 MMOL/L (ref 24–31)
CREAT SERPL-MCNC: 1 MG/DL (ref 0.4–1.6)
EGFRCR SERPLBLD CKD-EPI 2021: 58 ML/MIN/1.73M*2
EOSINOPHIL # BLD AUTO: 0.1 X10*3/UL (ref 0–0.4)
EOSINOPHIL NFR BLD AUTO: 0.9 %
ERYTHROCYTE [DISTWIDTH] IN BLOOD BY AUTOMATED COUNT: 14.3 % (ref 11.5–14.5)
GLUCOSE BLD MANUAL STRIP-MCNC: 120 MG/DL (ref 74–99)
GLUCOSE BLD MANUAL STRIP-MCNC: 126 MG/DL (ref 74–99)
GLUCOSE SERPL-MCNC: 116 MG/DL (ref 65–99)
HCT VFR BLD AUTO: 42.3 % (ref 36–46)
HGB BLD-MCNC: 13.7 G/DL (ref 12–16)
IMM GRANULOCYTES # BLD AUTO: 0.18 X10*3/UL (ref 0–0.5)
IMM GRANULOCYTES NFR BLD AUTO: 1.6 % (ref 0–0.9)
LYMPHOCYTES # BLD AUTO: 2.66 X10*3/UL (ref 0.8–3)
LYMPHOCYTES NFR BLD AUTO: 24.3 %
MAGNESIUM SERPL-MCNC: 1.8 MG/DL (ref 1.6–3.1)
MCH RBC QN AUTO: 28.7 PG (ref 26–34)
MCHC RBC AUTO-ENTMCNC: 32.4 G/DL (ref 32–36)
MCV RBC AUTO: 89 FL (ref 80–100)
MONOCYTES # BLD AUTO: 0.97 X10*3/UL (ref 0.05–0.8)
MONOCYTES NFR BLD AUTO: 8.9 %
NEUTROPHILS # BLD AUTO: 7.02 X10*3/UL (ref 1.6–5.5)
NEUTROPHILS NFR BLD AUTO: 64 %
NRBC BLD-RTO: 0 /100 WBCS (ref 0–0)
PHOSPHATE SERPL-MCNC: 4.4 MG/DL (ref 2.5–4.5)
PLATELET # BLD AUTO: 305 X10*3/UL (ref 150–450)
POTASSIUM SERPL-SCNC: 3.9 MMOL/L (ref 3.4–5.1)
RBC # BLD AUTO: 4.78 X10*6/UL (ref 4–5.2)
SODIUM SERPL-SCNC: 141 MMOL/L (ref 133–145)
WBC # BLD AUTO: 11 X10*3/UL (ref 4.4–11.3)

## 2024-08-01 PROCEDURE — 94760 N-INVAS EAR/PLS OXIMETRY 1: CPT

## 2024-08-01 PROCEDURE — 9420000001 HC RT PATIENT EDUCATION 5 MIN

## 2024-08-01 PROCEDURE — 2500000002 HC RX 250 W HCPCS SELF ADMINISTERED DRUGS (ALT 637 FOR MEDICARE OP, ALT 636 FOR OP/ED)

## 2024-08-01 PROCEDURE — 36415 COLL VENOUS BLD VENIPUNCTURE: CPT

## 2024-08-01 PROCEDURE — 85025 COMPLETE CBC W/AUTO DIFF WBC: CPT

## 2024-08-01 PROCEDURE — 2500000002 HC RX 250 W HCPCS SELF ADMINISTERED DRUGS (ALT 637 FOR MEDICARE OP, ALT 636 FOR OP/ED): Performed by: HOSPITALIST

## 2024-08-01 PROCEDURE — 97535 SELF CARE MNGMENT TRAINING: CPT | Mod: GO,CO

## 2024-08-01 PROCEDURE — 97110 THERAPEUTIC EXERCISES: CPT | Mod: GP,CQ

## 2024-08-01 PROCEDURE — 97116 GAIT TRAINING THERAPY: CPT | Mod: GP,CQ

## 2024-08-01 PROCEDURE — 2060000001 HC INTERMEDIATE ICU ROOM DAILY

## 2024-08-01 PROCEDURE — 83735 ASSAY OF MAGNESIUM: CPT

## 2024-08-01 PROCEDURE — 2500000001 HC RX 250 WO HCPCS SELF ADMINISTERED DRUGS (ALT 637 FOR MEDICARE OP)

## 2024-08-01 PROCEDURE — 80069 RENAL FUNCTION PANEL: CPT

## 2024-08-01 PROCEDURE — 94640 AIRWAY INHALATION TREATMENT: CPT

## 2024-08-01 PROCEDURE — 2500000004 HC RX 250 GENERAL PHARMACY W/ HCPCS (ALT 636 FOR OP/ED)

## 2024-08-01 PROCEDURE — 94664 DEMO&/EVAL PT USE INHALER: CPT

## 2024-08-01 PROCEDURE — 2500000001 HC RX 250 WO HCPCS SELF ADMINISTERED DRUGS (ALT 637 FOR MEDICARE OP): Performed by: INTERNAL MEDICINE

## 2024-08-01 PROCEDURE — 82947 ASSAY GLUCOSE BLOOD QUANT: CPT

## 2024-08-01 PROCEDURE — 2500000005 HC RX 250 GENERAL PHARMACY W/O HCPCS: Performed by: HOSPITALIST

## 2024-08-01 PROCEDURE — 2500000005 HC RX 250 GENERAL PHARMACY W/O HCPCS: Performed by: INTERNAL MEDICINE

## 2024-08-01 RX ORDER — CETIRIZINE HYDROCHLORIDE 10 MG/1
10 TABLET ORAL DAILY
Status: DISCONTINUED | OUTPATIENT
Start: 2024-08-01 | End: 2024-08-02 | Stop reason: HOSPADM

## 2024-08-01 RX ORDER — FLUTICASONE FUROATE AND VILANTEROL 200; 25 UG/1; UG/1
1 POWDER RESPIRATORY (INHALATION) DAILY
Status: DISCONTINUED | OUTPATIENT
Start: 2024-08-02 | End: 2024-08-02 | Stop reason: HOSPADM

## 2024-08-01 ASSESSMENT — COGNITIVE AND FUNCTIONAL STATUS - GENERAL
DAILY ACTIVITIY SCORE: 20
CLIMB 3 TO 5 STEPS WITH RAILING: A LITTLE
EATING MEALS: A LITTLE
MOVING TO AND FROM BED TO CHAIR: A LITTLE
EATING MEALS: A LITTLE
MOVING TO AND FROM BED TO CHAIR: A LITTLE
WALKING IN HOSPITAL ROOM: A LITTLE
HELP NEEDED FOR BATHING: A LITTLE
MOBILITY SCORE: 20
DRESSING REGULAR LOWER BODY CLOTHING: A LITTLE
DAILY ACTIVITIY SCORE: 20
TOILETING: A LITTLE
STANDING UP FROM CHAIR USING ARMS: A LITTLE
DRESSING REGULAR LOWER BODY CLOTHING: A LITTLE
STANDING UP FROM CHAIR USING ARMS: A LITTLE
MOBILITY SCORE: 20
TOILETING: A LITTLE
WALKING IN HOSPITAL ROOM: A LITTLE
CLIMB 3 TO 5 STEPS WITH RAILING: A LITTLE
HELP NEEDED FOR BATHING: A LITTLE

## 2024-08-01 ASSESSMENT — PAIN - FUNCTIONAL ASSESSMENT
PAIN_FUNCTIONAL_ASSESSMENT: 0-10

## 2024-08-01 ASSESSMENT — PAIN SCALES - GENERAL
PAINLEVEL_OUTOF10: 0 - NO PAIN

## 2024-08-01 ASSESSMENT — ACTIVITIES OF DAILY LIVING (ADL): HOME_MANAGEMENT_TIME_ENTRY: 27

## 2024-08-01 NOTE — PROGRESS NOTES
FOLLOWUP FOR: Respiratory failure secondary to COVID-19 infection    SUBJECTIVE  Oxygenation continues to improve and is now down to 4 L nasal cannula.  Hard to cough up some yellow sputum    PHYSICAL EXAM        7/31/2024     5:02 PM 7/31/2024     8:04 PM 8/1/2024    12:40 AM 8/1/2024     3:45 AM 8/1/2024     4:11 AM 8/1/2024     7:29 AM 8/1/2024    11:00 AM   Vitals   Systolic 136 140 127   135 136   Diastolic 77 71 70   78 80   Heart Rate 86     79    Temp 35.6 °C (96.1 °F) 36 °C (96.8 °F) 36.1 °C (97 °F) 36.3 °C (97.3 °F)  36.5 °C (97.7 °F) 36.5 °C (97.7 °F)   Resp 20     17    Weight (lb)     154.54     BMI     28.27 kg/m2     BSA (m2)     1.75 m2         Vital signs reviewed   NAD, awake and alert, O2, coughing   Lungs Symmetric excursions.  Auscultation - diminished but clear, no wheezing/rales/rhonchi.     Cor RSR No murmur, rub, gallop   Abd soft and nontender, no rebound or distention, no HS megaly   Ext no CCE   Neuro no focal deficits.  moves all extremities symmetrically.  speech normal.  affect normal    LABS    Serum chemistries were reviewed and are unremarkable.  CBC is unremarkable.  Marked improvement  Leukocytosis, now down to 11      ASSESSMENT:    .  Acute hypoxic respiratory failure  .  COVID-19 pneumonitis  .  Asthma    PLAN    .  To inhaled Breo  .  Complete 10 days dexamethasone  .  Continue to wean oxygen as sats allow  .  Will culture sputum but hold on further antibiotics unless evidence of a bacterial LRTI develops  .  Possible discharge 8/2      Fawad Pitt MD, [facility]

## 2024-08-01 NOTE — CARE PLAN
The patient's goals for the shift include      The clinical goals for the shift include improve oxygen sat and go home    Over the shift, the patient did not make progress toward the following goals. Barriers to progression include   Problem: Respiratory  Goal: No signs of respiratory distress (eg. Use of accessory muscles. Peds grunting)  Outcome: Progressing  Goal: Verbalize decreased shortness of breath this shift  Outcome: Progressing  Goal: Wean oxygen to maintain O2 saturation per order/standard this shift  Outcome: Progressing     Problem: Skin  Goal: Decreased wound size/increased tissue granulation at next dressing change  Outcome: Progressing  Flowsheets (Taken 8/1/2024 0210)  Decreased wound size/increased tissue granulation at next dressing change: Promote sleep for wound healing  Goal: Participates in plan/prevention/treatment measures  Outcome: Progressing  Flowsheets (Taken 8/1/2024 0210)  Participates in plan/prevention/treatment measures: Elevate heels  Goal: Prevent/manage excess moisture  Outcome: Progressing  Flowsheets (Taken 8/1/2024 0210)  Prevent/manage excess moisture: Moisturize dry skin  Goal: Prevent/minimize sheer/friction injuries  Outcome: Progressing  Flowsheets (Taken 8/1/2024 0210)  Prevent/minimize sheer/friction injuries: Increase activity/out of bed for meals  Goal: Promote/optimize nutrition  Outcome: Progressing  Flowsheets (Taken 8/1/2024 0210)  Promote/optimize nutrition: Monitor/record intake including meals  Goal: Promote skin healing  Outcome: Progressing  Flowsheets (Taken 8/1/2024 0210)  Promote skin healing: Assess skin/pad under line(s)/device(s)     Problem: Pain  Goal: Takes deep breaths with improved pain control throughout the shift  Outcome: Progressing  Goal: Turns in bed with improved pain control throughout the shift  Outcome: Progressing  Goal: Walks with improved pain control throughout the shift  Outcome: Progressing  Goal: Performs ADL's with improved pain  control throughout shift  Outcome: Progressing  Goal: Participates in PT with improved pain control throughout the shift  Outcome: Progressing  Goal: Free from opioid side effects throughout the shift  Outcome: Progressing  Goal: Free from acute confusion related to pain meds throughout the shift  Outcome: Progressing     Problem: Pain - Adult  Goal: Verbalizes/displays adequate comfort level or baseline comfort level  Outcome: Progressing     Problem: Safety - Adult  Goal: Free from fall injury  Outcome: Progressing     Problem: Discharge Planning  Goal: Discharge to home or other facility with appropriate resources  Outcome: Progressing     Problem: Chronic Conditions and Co-morbidities  Goal: Patient's chronic conditions and co-morbidity symptoms are monitored and maintained or improved  Outcome: Progressing     Problem: ADLs  Goal: Pt will complete ADL tasks at mod I with use of AE prn   Outcome: Progressing     Problem: Instrumental Activities of Daily Living  Goal: Pt will perform simple IADLs/retrieval of items at mod I.    Outcome: Progressing   . Recommendations to address these barriers include .

## 2024-08-01 NOTE — PROGRESS NOTES
Physical Therapy    Physical Therapy Treatment    Patient Name: Irma Baumann  MRN: [id number]  Today's Date: 8/1/2024  Time Calculation  Start Time: 1026  Stop Time: 1053  Time Calculation (min): 27 min    Assessment/Plan   PT Assessment  End of Session Communication: Bedside nurse  Assessment Comment: pt continues to present with decreased endurance and stability. Noted decreased 02 demands and able to maintain 94%-96% sp02 throughout session.  End of Session Patient Position: Up in chair, Alarm off, not on at start of session  PT Plan  Inpatient/Swing Bed or Outpatient: Inpatient  PT Plan  Treatment/Interventions: Bed mobility, Transfer training, Gait training, Balance training, Strengthening, Endurance training, Therapeutic exercise, Therapeutic activity  PT Plan: Ongoing PT  PT Frequency: 3 times per week  PT Discharge Recommendations: Low intensity level of continued care  Equipment Recommended upon Discharge: Other (comment) (none vs cane)  PT Recommended Transfer Status:  (SUPERVISION)  PT - OK to Discharge: Yes      General Visit Information:   PT  Visit  PT Received On: 08/01/24  General  Prior to Session Communication: Bedside nurse  Patient Position Received: Up in chair, Alarm off, not on at start of session  General Comment: Cleared by nursing. Pt agreeable to PT services. Pt sitting upright in chair.    Subjective   Precautions:  Precautions  Precautions Comment: COVID+  Vital Signs:  Vital Signs  SpO2: 94 % (94-96 on 4L 02 NC)    Objective   Pain:  Pain Assessment  Pain Assessment: 0-10  0-10 (Numeric) Pain Score: 0 - No pain  Cognition:  Cognition  Overall Cognitive Status: Within Functional Limits     Postural Control:  Static Sitting Balance  Static Sitting-Balance Support: Feet supported  Static Sitting-Level of Assistance: Independent  Static Sitting-Comment/Number of Minutes: good  Static Standing Balance  Static Standing-Balance Support: Left upper extremity supported (HHA and RUE  support)  Static Standing-Level of Assistance: Minimum assistance  Static Standing-Comment/Number of Minutes: good -       Treatments:  Therapeutic Exercise  Therapeutic Exercise Performed: Yes  Therapeutic Exercise Activity 1: Standing marches x10  Therapeutic Exercise Activity 2: Standing hip abd x10  Therapeutic Exercise Activity 3: Standing heel raises x10  Therapeutic Exercise Activity 4: Standing mini squats x10  Therapeutic Exercise Activity 5: STS from recliner x10    Ambulation/Gait Training  Ambulation/Gait Training Performed: Yes  Ambulation/Gait Training 1  Surface 1: Level tile  Device 1: No device (HHA and RUE support)  Assistance 1: Arm in arm assistance, Minimum assistance  Quality of Gait 1: Narrow base of support, Diminished heel strike, Decreased step length  Comments/Distance (ft) 1: 15 ft with arm in arm assistance. Assist to manage 02 tubing. Occasionally reaching out for objects for stability. Mild instability noted while amb needing Rosa.  Transfers  Transfer: Yes  Transfer 1  Transfer From 1: Sit to  Transfer to 1: Stand  Technique 1: Sit to stand, Stand to sit  Transfer Level of Assistance 1: Contact guard  Trials/Comments 1:  (Pt requires CGA for safety and balance.)    Outcome Measures:  [facility] Basic Mobility  Turning from your back to your side while in a flat bed without using bedrails: None  Moving from lying on your back to sitting on the side of a flat bed without using bedrails: None  Moving to and from bed to chair (including a wheelchair): A little  Standing up from a chair using your arms (e.g. wheelchair or bedside chair): A little  To walk in hospital room: A little  Climbing 3-5 steps with railing: A little  Basic Mobility - Total Score: 20    Encounter Problems       Encounter Problems (Active)       PT Problem       Patient will demonstrate improvements in strength  (Progressing)       Start:  07/26/24    Expected End:  08/23/24            Patient will transfer supine <> sit  independently  (Progressing)       Start:  07/26/24    Expected End:  08/23/24            Patient will transfer sit <> stand independently (Progressing)       Start:  07/26/24    Expected End:  08/23/24            Patient will ambulate 150 ft independently   (Progressing)       Start:  07/26/24    Expected End:  08/23/24               Pain - Adult

## 2024-08-01 NOTE — PROGRESS NOTES
Occupational Therapy    OT Treatment    Patient Name: Irma Baumann  MRN: [id number]  Today's Date: 8/1/2024  Time Calculation  Start Time: 1429  Stop Time: 1456  Time Calculation (min): 27 min        Assessment:  OT Assessment: Gradual progress made towards OT goals. Continue with current OT POC to increase strength, balance and functional tolerance to maximize safety and independence during ADLs.  Barriers to Discharge: Other (Comment) (O2 requirements)  Evaluation/Treatment Tolerance: Patient tolerated treatment well  End of Session Communication: Bedside nurse  End of Session Patient Position: Up in chair, Alarm off, not on at start of session (all needs in reach)  OT Assessment Results: Decreased ADL status, Decreased upper extremity strength, Decreased endurance, Decreased functional mobility, Decreased IADLs  Barriers to Discharge: Other (Comment) (O2 requirements)  Evaluation/Treatment Tolerance: Patient tolerated treatment well  Plan:  Treatment Interventions: ADL retraining, Functional transfer training, UE strengthening/ROM, Endurance training, Equipment evaluation/education, Patient/family training  OT Frequency: 4 times per week (Collaborated with OTR- pt would benefit from continued OT services x4week to maximize potential prior to discharge.)  OT Discharge Recommendations: Low intensity level of continued care  OT - OK to Discharge: Yes  Treatment Interventions: ADL retraining, Functional transfer training, UE strengthening/ROM, Endurance training, Equipment evaluation/education, Patient/family training    Subjective   Previous Visit Info:  OT Last Visit  OT Received On: 08/01/24  General:  General  Reason for Referral: impaired ADLs s/p Covid+  Past Medical History Relevant to Rehab: Gout, asthma, HLD, hypothyroidism, uterovaginal prolapse, OA, vit D deficiency  Prior to Session Communication: Bedside nurse  Patient Position Received: Up in chair, Alarm off, not on at start of session  General  Comment: Pt cleared for OT session per nursing, pleasant and cooperative this date.  Precautions:  Hearing/Visual Limitations: glasses, [place]  Medical Precautions: Fall precautions, Oxygen therapy device and L/min, Infection precautions (4L O2 via NC)  Precautions Comment: COVID+, BLE compressing stockings  Vital Signs:  Vital Signs  Heart Rate: 101  Heart Rate Source: Monitor  SpO2: 94 % (92-96% on 4L O2 via NC)  Pain:  Pain Assessment  Pain Assessment: 0-10  0-10 (Numeric) Pain Score: 0 - No pain  Clinical Progression: Not changed    Objective    Cognition:  Cognition  Overall Cognitive Status: Within Functional Limits  Safety/Judgement: Within Functional Limits  Insight: Mild  Impulsive: Mildly  Task Initiation: WFL  Processing Speed: Within funtional limits  Coordination:  Movements are Fluid and Coordinated: Yes  Activities of Daily Living: UE Bathing  UE Bathing Comments: UB bathing tasks completed from seated position with increased time and close supervision with set-up. EC/WS techniques reviewed, pt may benefit from use of shower chair upon home-going to maximize safety and ease of bathing completion.    LE Bathing  LE Bathing Comments: LB bathing tasks completed from seated position with set-up and close supervision. Increased time and effort required to complete. Perineal hygiene completed in standing with close supervision    UE Dressing  UE Dressing Comments: [place] required to don/doff hospital gown in standing for line management    LE Dressing  LE Dressing: Yes  LE Dressing Comments: pt able to don/doff socks using cross-leg technique and close supervision from seated position. Increased time and effort required to complete. Verbal instruction provided on adaptive grocery bag technique to don compression stockings. Pt verbalized understanding.  Functional Standing Tolerance:     Bed Mobility/Transfers: Transfers  Transfer: Yes  Transfer 1  Trials/Comments 1: sit<>stands completed from standard chair  height with close supervision    Other Activity:  Other Activity Performed: Yes  Other Activity 1: Verbal instruction and demonstration provided on functional mobility with O2 line management to increase safety and decrease risk of falls. Pt able to verbalize and demonstrate understanding during trials with close supervision.      Outcome Measures:[facility] Daily Activity  Putting on and taking off regular lower body clothing: A little  Bathing (including washing, rinsing, drying): A little  Putting on and taking off regular upper body clothing: None  Toileting, which includes using toilet, bedpan or urinal: A little  Taking care of personal grooming such as brushing teeth: None  Eating Meals: A little  Daily Activity - Total Score: 20        Education Documentation  ADL Training, taught by GISELLE Russo at 8/1/2024  5:01 PM.  Learner: Patient  Readiness: Acceptance  Method: Explanation, Demonstration  Response: Needs Reinforcement    Education Comments  Education provided on role of OT/POC, safety awareness throughout functional tasks/transfers, importance of activity/ rest routine, EC/WS techniques, and use of call light for assistance. Questions, comments and concerns addressed regarding OT.      Goals:  Encounter Problems       Encounter Problems (Active)       ADLs       Pt will complete ADL tasks at mod I with use of AE prn  (Progressing)       Start:  07/26/24    Expected End:  08/16/24               Functional Mobility       Pt will perform functional mobility household distances at mod I with use of LRAD.    (Progressing)       Start:  07/26/24    Expected End:  08/16/24               Instrumental Activities of Daily Living       Pt will perform simple IADLs/retrieval of items at mod I.   (Progressing)       Start:  07/26/24    Expected End:  08/16/24               OT Transfers       Pt will perform functional transfers at mod I.   (Progressing)       Start:  07/26/24    Expected End:  08/16/24

## 2024-08-01 NOTE — PROGRESS NOTES
08/01/24 1502   Discharge Planning   Expected Discharge Disposition  Services     Met with patient at bedside to discuss discharge planning.  Patient is agreeable to home health care.  Preferences reviewed and discussed, [facility] is agency of choice.   Dr. Toro Everett is PCP.    Also discussed [facility], patient agreeable to program.  Order placed.      Will need INTERNAL home health care referral placed prior to discharge

## 2024-08-01 NOTE — PROGRESS NOTES
Irma Baumann is a 79 y.o. female on day 12 of admission presenting with COVID-19.      Subjective   Feeling remarkably well.  She came off the 50% high flow and is now breathing comfortably on 4 L nasal cannula.  She did well with physical therapy too.    She is complaining about congestion and blockage in her ears       Objective alert oriented undistressed  Lungs faint bibasilar crackles no wheeze  Last Recorded Vitals  /80 (BP Location: Left arm, Patient Position: Lying)   Pulse 79   Temp 36.5 °C (97.7 °F) (Temporal)   Resp 17   Wt 70.1 kg (154 lb 8.7 oz)   SpO2 94% Comment: 94-96 on 4L 02 NC  Intake/Output last 3 Shifts:    Intake/Output Summary (Last 24 hours) at 8/1/2024 1309  Last data filed at 8/1/2024 1156  Gross per 24 hour   Intake 300 ml   Output 250 ml   Net 50 ml       Admission Weight  Weight: 72.6 kg (160 lb) (07/20/24 1725)    Daily Weight  08/01/24 : 70.1 kg (154 lb 8.7 oz)    Image Results  XR chest 2 views  Narrative: Interpreted By:  Chana Mehta,   STUDY:  XR CHEST 2 VIEWS 7/29/2024 9:50 am      INDICATION:  Signs/Symptoms:productive cough, bacterial pneumonia, covid      COMPARISON:  07/27/2024      ACCESSION NUMBER(S):  [id number]      ORDERING CLINICIAN:  ANTWON SKELTON      TECHNIQUE:  PA and lateral views of the chest      FINDINGS:  Cardiomediastinal silhouette is within normal limits. Examination  rotated accentuating the cardiac silhouette. There is left basilar  atelectasis/infiltrate. Also, component of mild right basilar  atelectasis. Lung fields are hypo inflated with basilar  bronchovascular crowding. Mild multilevel anterior osteophyte  formation within the thoracic spine with multilevel endplate  sclerosis demonstrated. No compression deformity.                          Impression: 1. Left basilar atelectasis/infiltrate. Also, component of right  basilar atelectasis demonstrated.      Signed by: Chana Mehta 7/29/2024 12:28 PM  Dictation workstation:    [id number]          Assessment/Plan      COVID with acute hypoxemic respiratory failure-she seems to have turned the corner and has dramatically improved in the last 2 days.  She still on 4 L nasal cannula.  We discussed that she could probably go home tomorrow if she is stable or continues to improve.  I am ordering Zyrtec for the ear congestion           Wojciech Mendoza MD

## 2024-08-01 NOTE — NURSING NOTE
Assumed care of pt, pt is awake sitting in the chair, on 4L, pt denies pain, bed locked and lowered, shift report given by PERRY Duarte, belongings and call light w/in reach.

## 2024-08-02 ENCOUNTER — HOME HEALTH ADMISSION (OUTPATIENT)
Dept: HOME HEALTH SERVICES | Facility: HOME HEALTH | Age: 79
End: 2024-08-02
Payer: MEDICARE

## 2024-08-02 ENCOUNTER — DOCUMENTATION (OUTPATIENT)
Dept: HOME HEALTH SERVICES | Facility: HOME HEALTH | Age: 79
End: 2024-08-02
Payer: MEDICARE

## 2024-08-02 VITALS
DIASTOLIC BLOOD PRESSURE: 61 MMHG | OXYGEN SATURATION: 91 % | TEMPERATURE: 97.7 F | SYSTOLIC BLOOD PRESSURE: 111 MMHG | BODY MASS INDEX: 28.48 KG/M2 | WEIGHT: 154.76 LBS | HEART RATE: 71 BPM | RESPIRATION RATE: 16 BRPM | HEIGHT: 62 IN

## 2024-08-02 LAB
ALBUMIN SERPL-MCNC: 3.1 G/DL (ref 3.5–5)
ANION GAP SERPL CALC-SCNC: 12 MMOL/L
BASOPHILS # BLD AUTO: 0.04 X10*3/UL (ref 0–0.1)
BASOPHILS NFR BLD AUTO: 0.4 %
BUN SERPL-MCNC: 22 MG/DL (ref 8–25)
CALCIUM SERPL-MCNC: 9 MG/DL (ref 8.5–10.4)
CHLORIDE SERPL-SCNC: 97 MMOL/L (ref 97–107)
CO2 SERPL-SCNC: 27 MMOL/L (ref 24–31)
CREAT SERPL-MCNC: 0.8 MG/DL (ref 0.4–1.6)
EGFRCR SERPLBLD CKD-EPI 2021: 76 ML/MIN/1.73M*2
EOSINOPHIL # BLD AUTO: 0.12 X10*3/UL (ref 0–0.4)
EOSINOPHIL NFR BLD AUTO: 1.1 %
ERYTHROCYTE [DISTWIDTH] IN BLOOD BY AUTOMATED COUNT: 14.1 % (ref 11.5–14.5)
GLUCOSE SERPL-MCNC: 110 MG/DL (ref 65–99)
HCT VFR BLD AUTO: 41.6 % (ref 36–46)
HGB BLD-MCNC: 13.7 G/DL (ref 12–16)
IMM GRANULOCYTES # BLD AUTO: 0.18 X10*3/UL (ref 0–0.5)
IMM GRANULOCYTES NFR BLD AUTO: 1.6 % (ref 0–0.9)
LYMPHOCYTES # BLD AUTO: 2.09 X10*3/UL (ref 0.8–3)
LYMPHOCYTES NFR BLD AUTO: 18.5 %
MAGNESIUM SERPL-MCNC: 1.7 MG/DL (ref 1.6–3.1)
MCH RBC QN AUTO: 28.4 PG (ref 26–34)
MCHC RBC AUTO-ENTMCNC: 32.9 G/DL (ref 32–36)
MCV RBC AUTO: 86 FL (ref 80–100)
MONOCYTES # BLD AUTO: 1 X10*3/UL (ref 0.05–0.8)
MONOCYTES NFR BLD AUTO: 8.9 %
NEUTROPHILS # BLD AUTO: 7.86 X10*3/UL (ref 1.6–5.5)
NEUTROPHILS NFR BLD AUTO: 69.5 %
NRBC BLD-RTO: 0 /100 WBCS (ref 0–0)
PHOSPHATE SERPL-MCNC: 3.3 MG/DL (ref 2.5–4.5)
PLATELET # BLD AUTO: 285 X10*3/UL (ref 150–450)
POTASSIUM SERPL-SCNC: 3.1 MMOL/L (ref 3.4–5.1)
RBC # BLD AUTO: 4.82 X10*6/UL (ref 4–5.2)
SODIUM SERPL-SCNC: 136 MMOL/L (ref 133–145)
WBC # BLD AUTO: 11.3 X10*3/UL (ref 4.4–11.3)

## 2024-08-02 PROCEDURE — 36415 COLL VENOUS BLD VENIPUNCTURE: CPT

## 2024-08-02 PROCEDURE — 2500000001 HC RX 250 WO HCPCS SELF ADMINISTERED DRUGS (ALT 637 FOR MEDICARE OP): Performed by: INTERNAL MEDICINE

## 2024-08-02 PROCEDURE — 2500000001 HC RX 250 WO HCPCS SELF ADMINISTERED DRUGS (ALT 637 FOR MEDICARE OP)

## 2024-08-02 PROCEDURE — 2500000005 HC RX 250 GENERAL PHARMACY W/O HCPCS: Performed by: INTERNAL MEDICINE

## 2024-08-02 PROCEDURE — 83735 ASSAY OF MAGNESIUM: CPT

## 2024-08-02 PROCEDURE — 94640 AIRWAY INHALATION TREATMENT: CPT

## 2024-08-02 PROCEDURE — 2500000002 HC RX 250 W HCPCS SELF ADMINISTERED DRUGS (ALT 637 FOR MEDICARE OP, ALT 636 FOR OP/ED): Performed by: INTERNAL MEDICINE

## 2024-08-02 PROCEDURE — 80069 RENAL FUNCTION PANEL: CPT

## 2024-08-02 PROCEDURE — 2500000004 HC RX 250 GENERAL PHARMACY W/ HCPCS (ALT 636 FOR OP/ED)

## 2024-08-02 PROCEDURE — 85025 COMPLETE CBC W/AUTO DIFF WBC: CPT

## 2024-08-02 PROCEDURE — 9420000001 HC RT PATIENT EDUCATION 5 MIN

## 2024-08-02 RX ORDER — CETIRIZINE HYDROCHLORIDE 10 MG/1
10 TABLET ORAL DAILY
Start: 2024-08-03

## 2024-08-02 RX ORDER — POTASSIUM CHLORIDE 20 MEQ/1
40 TABLET, EXTENDED RELEASE ORAL ONCE
Status: COMPLETED | OUTPATIENT
Start: 2024-08-02 | End: 2024-08-02

## 2024-08-02 RX ORDER — ACETAMINOPHEN 325 MG/1
650 TABLET ORAL EVERY 6 HOURS PRN
Start: 2024-08-02

## 2024-08-02 RX ORDER — METOPROLOL TARTRATE 25 MG/1
25 TABLET, FILM COATED ORAL 2 TIMES DAILY
Start: 2024-08-02

## 2024-08-02 RX ORDER — HYDROCODONE BITARTRATE AND HOMATROPINE METHYLBROMIDE ORAL SOLUTION 5; 1.5 MG/5ML; MG/5ML
5 LIQUID ORAL EVERY 6 HOURS PRN
Qty: 200 ML | Refills: 0 | Status: SHIPPED | OUTPATIENT
Start: 2024-08-02 | End: 2024-08-09

## 2024-08-02 RX ORDER — NAPROXEN SODIUM 220 MG/1
81 TABLET, FILM COATED ORAL DAILY
Start: 2024-08-03

## 2024-08-02 RX ORDER — POTASSIUM CHLORIDE 20 MEQ/1
20 TABLET, EXTENDED RELEASE ORAL ONCE
Status: COMPLETED | OUTPATIENT
Start: 2024-08-02 | End: 2024-08-02

## 2024-08-02 ASSESSMENT — PAIN SCALES - GENERAL
PAINLEVEL_OUTOF10: 0 - NO PAIN
PAINLEVEL_OUTOF10: 1
PAINLEVEL_OUTOF10: 0 - NO PAIN
PAINLEVEL_OUTOF10: 0 - NO PAIN

## 2024-08-02 ASSESSMENT — COGNITIVE AND FUNCTIONAL STATUS - GENERAL
DAILY ACTIVITIY SCORE: 19
CLIMB 3 TO 5 STEPS WITH RAILING: TOTAL
DRESSING REGULAR LOWER BODY CLOTHING: A LITTLE
MOBILITY SCORE: 21
PERSONAL GROOMING: A LITTLE
TOILETING: A LITTLE
DRESSING REGULAR UPPER BODY CLOTHING: A LITTLE
HELP NEEDED FOR BATHING: A LITTLE

## 2024-08-02 NOTE — HH CARE COORDINATION
Home Care received a Referral for Nursing, Physical Therapy, and Occupational Therapy. We have processed the referral for a Start of Care on 08/03-08/04.     If you have any questions or concerns, please feel free to contact us at [phone]. Follow the prompts, enter your five digit zip code, and you will be directed to your care team on [facility] 1.

## 2024-08-02 NOTE — DISCHARGE SUMMARY
Discharge Diagnosis  COVID-19    Issues Requiring Follow-Up  COVID-19.  Acute respiratory failure    Discharge Meds     Your medication list        START taking these medications        Instructions Last Dose Given Next Dose Due   acetaminophen 325 mg tablet  Commonly known as: Tylenol      Take 2 tablets (650 mg) by mouth every 6 hours if needed for moderate pain (4 - 6).       aspirin 81 mg chewable tablet  Start taking on: August 3, 2024      Chew 1 tablet (81 mg) once daily.       cetirizine 10 mg tablet  Commonly known as: ZyrTEC  Start taking on: August 3, 2024      Take 1 tablet (10 mg) by mouth once daily.       hydrocodone-homatropine 5-1.5 mg/5 mL syrup  Commonly known as: Hycodan      Take 5 mL by mouth every 6 hours if needed for cough for up to 7 days.              CHANGE how you take these medications        Instructions Last Dose Given Next Dose Due   metoprolol tartrate 25 mg tablet  Commonly known as: Lopressor  What changed:   medication strength  how much to take  additional instructions      Take 1 tablet (25 mg) by mouth 2 times a day. Cut your 50 mg tablets in half.              CONTINUE taking these medications        Instructions Last Dose Given Next Dose Due   albuterol 2.5 mg /3 mL (0.083 %) nebulizer solution           albuterol 108 (90 Base) MCG/ACT inhaler           Colcrys 0.6 mg tablet  Generic drug: colchicine           econazole nitrate 1 % cream           febuxostat 80 mg tablet  Commonly known as: Uloric      Take 1 tablet (80 mg) by mouth once daily.       ipratropium 42 mcg (0.06 %) nasal spray  Commonly known as: Atrovent           levothyroxine 25 mcg tablet  Commonly known as: Synthroid, Levoxyl      TAKE 1 TABLET BY MOUTH IN THE  MORNING ON AN EMPTY STOMACH       potassium chloride CR 20 mEq ER tablet  Commonly known as: Klor-Con M20      Take 1 tablet (20 mEq) by mouth once daily. With food       simvastatin 40 mg tablet  Commonly known as: Zocor      TAKE 1 TABLET BY MOUTH  ONCE  DAILY IN THE EVENING       Symbicort 160-4.5 mcg/actuation inhaler  Generic drug: budesonide-formoteroL           triamcinolone 0.5 % cream  Commonly known as: Kenalog      Apply topically 2 times a day.       triamterene-hydrochlorothiazid 75-50 mg tablet  Commonly known as: Maxzide      Take 0.5 tablets by mouth once daily.       VITAMIN D3 ORAL                  STOP taking these medications      AdviL 200 mg tablet  Generic drug: ibuprofen                  Where to Get Your Medications        These medications were sent to [id number] - [place], [place] - [address]  [address], [place] [facility] [address]      Phone: [phone]   hydrocodone-homatropine 5-1.5 mg/5 mL syrup       Information about where to get these medications is not yet available    Ask your nurse or doctor about these medications  acetaminophen 325 mg tablet  aspirin 81 mg chewable tablet  cetirizine 10 mg tablet  metoprolol tartrate 25 mg tablet         Test Results Pending At Discharge  Pending Labs       Order Current Status    Troponin T Series, High Sensitivity (0, 2HR, 6HR) In process            Hospital Course   Presented with COVID-19.  Acute respiratory failure.  He is at high level of respiratory support including high flow nasal cannula.  Finished a course of antibiotics to cover bacterial pneumonia.  Finish course of antivirals.  Finished a course of steroids.  Within the last 3 days her respiratory status has dramatically improved going from requiring a 50% high flow nasal cannula to now breathing comfortably on room air with a pulse ox of around 90%..  We are testing her for home O2 needs and discharging her to home today with home care.  She is to follow-up with pulmonologist Dr. Marcus.    Spent 35 minutes arranging coordinating documenting this discharge today.    Pertinent Physical Exam At Time of Discharge  Physical Exam    Outpatient Follow-Up  Future Appointments   Date Time Provider  Department Center   5/20/2025 10:30 AM Toro Everett MD [id number] [facility]         Wojciech Mendoza MD

## 2024-08-02 NOTE — CARE PLAN
The patient's goals for the shift include      The clinical goals for the shift include pt will remain free of falls and injuries    Over the shift, the patient did not make progress toward the following goals. Barriers to progression include   Problem: Respiratory  Goal: No signs of respiratory distress (eg. Use of accessory muscles. Peds grunting)  Outcome: Progressing  Goal: Verbalize decreased shortness of breath this shift  Outcome: Progressing  Goal: Wean oxygen to maintain O2 saturation per order/standard this shift  Outcome: Progressing     Problem: Skin  Goal: Decreased wound size/increased tissue granulation at next dressing change  Outcome: Progressing  Flowsheets (Taken 8/2/2024 0435)  Decreased wound size/increased tissue granulation at next dressing change: Promote sleep for wound healing  Goal: Participates in plan/prevention/treatment measures  Outcome: Progressing  Flowsheets (Taken 8/2/2024 0435)  Participates in plan/prevention/treatment measures: Elevate heels  Goal: Prevent/manage excess moisture  Outcome: Progressing  Flowsheets (Taken 8/2/2024 0435)  Prevent/manage excess moisture: Moisturize dry skin  Goal: Prevent/minimize sheer/friction injuries  Outcome: Progressing  Flowsheets (Taken 8/2/2024 0435)  Prevent/minimize sheer/friction injuries: Use pull sheet  Goal: Promote/optimize nutrition  Outcome: Progressing  Flowsheets (Taken 8/2/2024 0435)  Promote/optimize nutrition: Monitor/record intake including meals  Goal: Promote skin healing  Outcome: Progressing  Flowsheets (Taken 8/2/2024 0435)  Promote skin healing: Turn/reposition every 2 hours/use positioning/transfer devices     Problem: Pain  Goal: Takes deep breaths with improved pain control throughout the shift  Outcome: Progressing  Goal: Turns in bed with improved pain control throughout the shift  Outcome: Progressing  Goal: Walks with improved pain control throughout the shift  Outcome: Progressing  Goal: Performs ADL's with  improved pain control throughout shift  Outcome: Progressing  Goal: Participates in PT with improved pain control throughout the shift  Outcome: Progressing  Goal: Free from opioid side effects throughout the shift  Outcome: Progressing  Goal: Free from acute confusion related to pain meds throughout the shift  Outcome: Progressing     Problem: Pain - Adult  Goal: Verbalizes/displays adequate comfort level or baseline comfort level  Outcome: Progressing     Problem: Safety - Adult  Goal: Free from fall injury  Outcome: Progressing     Problem: Discharge Planning  Goal: Discharge to home or other facility with appropriate resources  Outcome: Progressing     Problem: Chronic Conditions and Co-morbidities  Goal: Patient's chronic conditions and co-morbidity symptoms are monitored and maintained or improved  Outcome: Progressing     Problem: ADLs  Goal: Pt will complete ADL tasks at mod I with use of AE prn   Outcome: Progressing     Problem: Instrumental Activities of Daily Living  Goal: Pt will perform simple IADLs/retrieval of items at mod I.    Outcome: Progressing   . Recommendations to address these barriers include .

## 2024-08-02 NOTE — SIGNIFICANT EVENT
RT performed Home O2 evaluation. Patient at rest on RA spo2 91%. During exertion on RA patient desat to spo2 86%. Patient was placed on 2L NC, spo2 was then 96%. Patient qualifies for 2L on exertion. [employer] company was notified. Patient was educated on home oxygen concentrator.   Concentrator S/N: [phone]

## 2024-08-02 NOTE — PROGRESS NOTES
FOLLOWUP FOR: Respiratory failure secondary to COVID-19 infection    SUBJECTIVE  Oxygenation continues to improve and is now down to 4 L nasal cannula.  Hard to cough up some yellow sputum    PHYSICAL EXAM        8/1/2024     2:29 PM 8/1/2024     3:33 PM 8/1/2024     7:00 PM 8/2/2024    12:00 AM 8/2/2024     4:45 AM 8/2/2024     6:00 AM 8/2/2024     7:34 AM   Vitals   Systolic  137 131 111 107  109   Diastolic  81 87 74 55  59   Heart Rate 101 98  71      Temp  36.5 °C (97.7 °F) 36.7 °C (98.1 °F)  36.1 °C (97 °F)  36.3 °C (97.3 °F)   Resp  25  16      Weight (lb)     154.8 154.76    BMI     28.31 kg/m2 28.31 kg/m2    BSA (m2)     1.75 m2 1.75 m2        Vital signs reviewed   NAD, awake and alert, O2, coughing   Lungs Symmetric excursions.  Auscultation - diminished but clear, no wheezing/rales/rhonchi.     Cor RSR No murmur, rub, gallop   Abd soft and nontender, no rebound or distention, no HS megaly   Ext no CCE   Neuro no focal deficits.  moves all extremities symmetrically.  speech normal.  affect normal    LABS    Serum chemistries were reviewed and are unremarkable.  CBC is unremarkable.  Marked improvement  Leukocytosis, now down to 11      ASSESSMENT:    .  Acute hypoxic respiratory failure, improved  .  COVID-19 pneumonitis, improved  .  Asthma, stable    PLAN    .  Continue inhaled Breo  .  Complete 10 days dexamethasone  .  Home oxygen evaluation  .  Possible discharge 8/2  .  Follow-up in our office 3 weeks  .  Will sign off.  Thank you      Fawad Pitt MD, [facility]

## 2024-08-02 NOTE — DISCHARGE INSTRUCTIONS
You are to continue to isolate through August 10.  Minimize exposure to people other than your .  Wear a mask if you have any necessary visitors.  Try to not go out in public until August 10.  If you do go out in public, wear a mask

## 2024-08-02 NOTE — PROGRESS NOTES
08/02/24 0821   Discharge Planning   Expected Discharge Disposition  Services  (HC and healthy at home referral)     SOC 08/03-08/04     Will need internal referral for home health upon discharge

## 2024-08-05 ENCOUNTER — DOCUMENTATION (OUTPATIENT)
Dept: PRIMARY CARE | Facility: CLINIC | Age: 79
End: 2024-08-05
Payer: MEDICARE

## 2024-08-05 ENCOUNTER — HOME CARE VISIT (OUTPATIENT)
Dept: HOME HEALTH SERVICES | Facility: HOME HEALTH | Age: 79
End: 2024-08-05
Payer: MEDICARE

## 2024-08-05 ENCOUNTER — PATIENT OUTREACH (OUTPATIENT)
Dept: PRIMARY CARE | Facility: CLINIC | Age: 79
End: 2024-08-05
Payer: MEDICARE

## 2024-08-05 VITALS
RESPIRATION RATE: 18 BRPM | HEART RATE: 57 BPM | OXYGEN SATURATION: 96 % | TEMPERATURE: 97.2 F | DIASTOLIC BLOOD PRESSURE: 70 MMHG | SYSTOLIC BLOOD PRESSURE: 110 MMHG

## 2024-08-05 PROCEDURE — G0299 HHS/HOSPICE OF RN EA 15 MIN: HCPCS

## 2024-08-05 SDOH — ECONOMIC STABILITY: HOUSING INSECURITY: EVIDENCE OF SMOKING MATERIAL: 0

## 2024-08-05 SDOH — HEALTH STABILITY: MENTAL HEALTH: SMOKING IN HOME: 0

## 2024-08-05 ASSESSMENT — ENCOUNTER SYMPTOMS
SPUTUM CONSISTENCY: THIN
APPETITE LEVEL: GOOD
COUGH CHARACTERISTICS: NON-PRODUCTIVE
SHORTNESS OF BREATH: 1
SPUTUM CONSISTENCY: THICK
SPUTUM COLOR: YELLOW
COUGH: 1
LOWER EXTREMITY EDEMA: 1
DYSPNEA ACTIVITY LEVEL: AFTER AMBULATING 10 - 20 FT
SPUTUM AMOUNT: SCANT
SPUTUM PRODUCTION: 1
DENIES PAIN: 1

## 2024-08-05 ASSESSMENT — ACTIVITIES OF DAILY LIVING (ADL)
ENTERING_EXITING_HOME: MODERATE ASSIST
AMBULATION ASSISTANCE: STAND BY ASSIST
OASIS_M1830: 03
CURRENT_FUNCTION: STAND BY ASSIST

## 2024-08-05 NOTE — PROGRESS NOTES
Discharge Facility: [facility]     Discharge Diagnosis:    COVID-19     Issues Requiring Follow-Up  COVID-19.  Acute respiratory failure      Admission Date: 7/21/2024   Discharge Date:  /2/2024     PCP Appointment Date: 8/13/2024     Specialist Appointment Date:     [facility] 8/20/2024     Hospital Encounter and Summary Linked: Yes    See discharge assessment below for further details     Engagement  Call Start Time: 1436 (8/5/2024  2:36 PM)    Medications  Medications reviewed with patient/caregiver?: Yes (8/5/2024  2:36 PM)  Is the patient having any side effects they believe may be caused by any medication additions or changes?: No (8/5/2024  2:36 PM)  Does the patient have all medications ordered at discharge?: Yes (8/5/2024  2:36 PM)  Care Management Interventions: No intervention needed (8/5/2024  2:36 PM)  Prescription Comments: Nedw meds reviewed ,START taking these medications         Instructions  Last Dose Given  Next Dose Due  acetaminophen 325 mg tablet  Commonly known as: Tylenol        Take 2 tablets (650 mg) by mouth every 6 hours if needed for moderate pain (4 - 6).           aspirin 81 mg chewable tablet  Start taking on: August 3, 2024        Chew 1 tablet (81 mg) once daily.           cetirizine 10 mg tablet  Commonly known as: ZyrTEC  Start taking on: August 3, 2024        Take 1 tablet (10 mg) by mouth once daily.           hydrocodone-homatropine 5-1.5 mg/5 mL syrup  Commonly known as: Hycodan        Take 5 mL by mouth every 6 hours if needed for cough for up to 7 days.      Stop Advil (8/5/2024  2:36 PM)  Is the patient taking all medications as directed (includes completed medication regime)?: -- (Patient states has all meds) (8/5/2024  2:36 PM)  Medication Comments: no questions or concerns (8/5/2024  2:36 PM)    Appointments  Does the patient have a primary care provider?: Yes (8/5/2024  2:36 PM)  Care Management Interventions: Verified appointment date/time/provider (8/5/2024  2:36  PM)  Has the patient kept scheduled appointments due by today?: Yes (8/5/2024  2:36 PM)  Care Management Interventions: Educated on importance of keeping appointment (8/5/2024  2:36 PM)    Self Management  [facility] (8/5/2024  2:36 PM)  Has home health visited the patient within 72 hours of discharge?: Yes (8/5/2024  2:36 PM)  What Durable Medical Equipment (DME) was ordered?: NA (8/5/2024  2:36 PM)    Patient Teaching  Does the patient have access to their discharge instructions?: Yes (8/5/2024  2:36 PM)  Care Management Interventions: Reviewed instructions with patient (8/5/2024  2:36 PM)  What is the patient's perception of their health status since discharge?: Improving (8/5/2024  2:36 PM)  Is the patient/caregiver able to teach back the hierarchy of who to call/visit for symptoms/problems? PCP, Specialist, Home Health nurse, Urgent Care, ED, 911: Yes (8/5/2024  2:36 PM)  Patient/Caregiver Education Comments: Patient aware to use I.S as instructed, aware to call providers for any questions, concerns  or change in condition, [facility] in home today nurse. Patient states improving slowly (8/5/2024  2:36 PM)

## 2024-08-06 ENCOUNTER — HOME CARE VISIT (OUTPATIENT)
Dept: HOME HEALTH SERVICES | Facility: HOME HEALTH | Age: 79
End: 2024-08-06
Payer: MEDICARE

## 2024-08-06 ENCOUNTER — PATIENT OUTREACH (OUTPATIENT)
Dept: HOME HEALTH SERVICES | Age: 79
End: 2024-08-06
Payer: MEDICARE

## 2024-08-06 VITALS
DIASTOLIC BLOOD PRESSURE: 84 MMHG | HEART RATE: 60 BPM | OXYGEN SATURATION: 97 % | SYSTOLIC BLOOD PRESSURE: 132 MMHG | RESPIRATION RATE: 20 BRPM | TEMPERATURE: 98.2 F

## 2024-08-06 PROCEDURE — G0151 HHCP-SERV OF PT,EA 15 MIN: HCPCS

## 2024-08-06 ASSESSMENT — ENCOUNTER SYMPTOMS
OCCASIONAL FEELINGS OF UNSTEADINESS: 0
PAIN: 1
PERSON REPORTING PAIN: PATIENT

## 2024-08-09 ENCOUNTER — HOME CARE VISIT (OUTPATIENT)
Dept: HOME HEALTH SERVICES | Facility: HOME HEALTH | Age: 79
End: 2024-08-09
Payer: MEDICARE

## 2024-08-09 VITALS
DIASTOLIC BLOOD PRESSURE: 70 MMHG | SYSTOLIC BLOOD PRESSURE: 128 MMHG | TEMPERATURE: 97.2 F | HEART RATE: 58 BPM | RESPIRATION RATE: 20 BRPM | OXYGEN SATURATION: 94 %

## 2024-08-09 PROCEDURE — G0299 HHS/HOSPICE OF RN EA 15 MIN: HCPCS

## 2024-08-09 PROCEDURE — G0152 HHCP-SERV OF OT,EA 15 MIN: HCPCS

## 2024-08-09 ASSESSMENT — ENCOUNTER SYMPTOMS
FATIGUES EASILY: 1
FATIGUE: 1
PERSON REPORTING PAIN: PATIENT
DYSPNEA ON EXERTION: 1
DYSPNEA ACTIVITY LEVEL: AFTER AMBULATING MORE THAN 20 FT
APPETITE LEVEL: GOOD
FLUID RETENTION: 1
SHORTNESS OF BREATH: 1
DENIES PAIN: 1

## 2024-08-09 NOTE — CASE COMMUNICATION
FYI   Pt holding Lopressor if P lower than 60 and held once a day for last two days, P  58 today  Pox 94%  Pt has an extended COVID type complaint of feeling fuzzy, not blurred vision but not right she states.

## 2024-08-10 VITALS
SYSTOLIC BLOOD PRESSURE: 116 MMHG | HEART RATE: 69 BPM | TEMPERATURE: 97.9 F | DIASTOLIC BLOOD PRESSURE: 62 MMHG | OXYGEN SATURATION: 95 % | RESPIRATION RATE: 18 BRPM

## 2024-08-10 DIAGNOSIS — Z00.00 ANNUAL PHYSICAL EXAM: Primary | ICD-10-CM

## 2024-08-10 SDOH — HEALTH STABILITY: PHYSICAL HEALTH
EXERCISE COMMENTS: PT INSTRUCTED PATIENT IN SEATED EXERCISES IN ORDER TO IMPROVE LE STRENGTH AND FUNCTIONAL PERFORMANCE, 1X10:  - KNEE EXTENSION  - KNEE FLEXION  - HIP FLEXION  - HIP ABDUCTION/ADDUCTION  - PF/DF    PT EDUCATED PATIENT ON IMPORTANCE OF PERFORMING EXERCI

## 2024-08-10 SDOH — HEALTH STABILITY: PHYSICAL HEALTH
EXERCISE COMMENTS: SES DAILY WHILE MONITORING RPE AND PERFORMING PROPER BREATHING PATTERN IN ORDER TO IMPROVE STRENGTH AND ACTIVITY TOLERANCE FOR IMPROVED FUNCTIONAL PERFORMANCE.

## 2024-08-10 ASSESSMENT — PAIN SCALES - PAIN ASSESSMENT IN ADVANCED DEMENTIA (PAINAD)
BREATHING: 0
FACIALEXPRESSION: 0 - SMILING OR INEXPRESSIVE.
BODYLANGUAGE: 0
FACIALEXPRESSION: 0
NEGVOCALIZATION: 0
CONSOLABILITY: 0 - NO NEED TO CONSOLE.
TOTALSCORE: 0
BODYLANGUAGE: 0 - RELAXED.
CONSOLABILITY: 0
NEGVOCALIZATION: 0 - NONE.

## 2024-08-10 ASSESSMENT — ACTIVITIES OF DAILY LIVING (ADL)
GROOMING_WITHIN_DEFINED_LIMITS: 1
FEEDING_WITHIN_DEFINED_LIMITS: 1
WASHING_LB_CURRENT_FUNCTION: INDEPENDENT
BATHING_CURRENT_FUNCTION: INDEPENDENT
AMBULATION ASSISTANCE ON FLAT SURFACES: 1
TOILETING: INDEPENDENT
BATHING ASSESSED: 1
TOILETING: 1
AMBULATION_DISTANCE/DURATION_TOLERATED: 100 FT.
DRESSING_LB_CURRENT_FUNCTION: INDEPENDENT

## 2024-08-10 ASSESSMENT — ENCOUNTER SYMPTOMS
DENIES PAIN: 1
PERSON REPORTING PAIN: PATIENT

## 2024-08-12 ENCOUNTER — HOME CARE VISIT (OUTPATIENT)
Dept: HOME HEALTH SERVICES | Facility: HOME HEALTH | Age: 79
End: 2024-08-12
Payer: MEDICARE

## 2024-08-12 VITALS
HEART RATE: 62 BPM | TEMPERATURE: 97.9 F | RESPIRATION RATE: 16 BRPM | SYSTOLIC BLOOD PRESSURE: 104 MMHG | OXYGEN SATURATION: 97 % | DIASTOLIC BLOOD PRESSURE: 64 MMHG

## 2024-08-12 VITALS
RESPIRATION RATE: 20 BRPM | DIASTOLIC BLOOD PRESSURE: 62 MMHG | TEMPERATURE: 97.8 F | HEART RATE: 73 BPM | OXYGEN SATURATION: 93 % | SYSTOLIC BLOOD PRESSURE: 114 MMHG

## 2024-08-12 DIAGNOSIS — J45.901 EXACERBATION OF ASTHMA, UNSPECIFIED ASTHMA SEVERITY, UNSPECIFIED WHETHER PERSISTENT (HHS-HCC): Primary | ICD-10-CM

## 2024-08-12 PROCEDURE — G0299 HHS/HOSPICE OF RN EA 15 MIN: HCPCS

## 2024-08-12 PROCEDURE — G0151 HHCP-SERV OF PT,EA 15 MIN: HCPCS

## 2024-08-12 RX ORDER — METOPROLOL TARTRATE 50 MG/1
50 TABLET ORAL 2 TIMES DAILY
Qty: 180 TABLET | Refills: 3 | Status: SHIPPED | OUTPATIENT
Start: 2024-08-12 | End: 2024-08-16 | Stop reason: WASHOUT

## 2024-08-12 ASSESSMENT — ENCOUNTER SYMPTOMS
DENIES PAIN: 1
LAST BOWEL MOVEMENT: 67064
COUGH CHARACTERISTICS: DRY
DENIES PAIN: 1
APPETITE LEVEL: GOOD
PERSON REPORTING PAIN: PATIENT
PERSON REPORTING PAIN: PATIENT
LOWER EXTREMITY EDEMA: 1
COUGH: 1

## 2024-08-13 ENCOUNTER — OFFICE VISIT (OUTPATIENT)
Dept: PRIMARY CARE | Facility: CLINIC | Age: 79
End: 2024-08-13
Payer: MEDICARE

## 2024-08-13 ENCOUNTER — LAB (OUTPATIENT)
Dept: LAB | Facility: LAB | Age: 79
End: 2024-08-13
Payer: MEDICARE

## 2024-08-13 VITALS
TEMPERATURE: 97.6 F | SYSTOLIC BLOOD PRESSURE: 124 MMHG | OXYGEN SATURATION: 96 % | WEIGHT: 150 LBS | HEART RATE: 65 BPM | DIASTOLIC BLOOD PRESSURE: 60 MMHG | BODY MASS INDEX: 27.6 KG/M2 | HEIGHT: 62 IN

## 2024-08-13 DIAGNOSIS — J18.9 PNEUMONIA OF BOTH LOWER LOBES DUE TO INFECTIOUS ORGANISM: ICD-10-CM

## 2024-08-13 DIAGNOSIS — E87.6 HYPOKALEMIA: ICD-10-CM

## 2024-08-13 DIAGNOSIS — J96.00 ACUTE RESPIRATORY FAILURE DUE TO COVID-19 (MULTI): Primary | ICD-10-CM

## 2024-08-13 DIAGNOSIS — U07.1 ACUTE RESPIRATORY FAILURE DUE TO COVID-19 (MULTI): Primary | ICD-10-CM

## 2024-08-13 DIAGNOSIS — R09.81 HEAD CONGESTION: ICD-10-CM

## 2024-08-13 LAB
ANION GAP SERPL CALC-SCNC: 12 MMOL/L
BUN SERPL-MCNC: 11 MG/DL (ref 8–25)
CALCIUM SERPL-MCNC: 9.6 MG/DL (ref 8.5–10.4)
CHLORIDE SERPL-SCNC: 105 MMOL/L (ref 97–107)
CO2 SERPL-SCNC: 27 MMOL/L (ref 24–31)
CREAT SERPL-MCNC: 1 MG/DL (ref 0.4–1.6)
EGFRCR SERPLBLD CKD-EPI 2021: 58 ML/MIN/1.73M*2
GLUCOSE SERPL-MCNC: 86 MG/DL (ref 65–99)
POTASSIUM SERPL-SCNC: 3.7 MMOL/L (ref 3.4–5.1)
SODIUM SERPL-SCNC: 144 MMOL/L (ref 133–145)

## 2024-08-13 PROCEDURE — 1036F TOBACCO NON-USER: CPT | Performed by: LICENSED PRACTICAL NURSE

## 2024-08-13 PROCEDURE — 80048 BASIC METABOLIC PNL TOTAL CA: CPT

## 2024-08-13 PROCEDURE — 36415 COLL VENOUS BLD VENIPUNCTURE: CPT

## 2024-08-13 PROCEDURE — 3074F SYST BP LT 130 MM HG: CPT | Performed by: LICENSED PRACTICAL NURSE

## 2024-08-13 PROCEDURE — 1159F MED LIST DOCD IN RCRD: CPT | Performed by: LICENSED PRACTICAL NURSE

## 2024-08-13 PROCEDURE — 3078F DIAST BP <80 MM HG: CPT | Performed by: LICENSED PRACTICAL NURSE

## 2024-08-13 PROCEDURE — 1111F DSCHRG MED/CURRENT MED MERGE: CPT | Performed by: LICENSED PRACTICAL NURSE

## 2024-08-13 PROCEDURE — 1157F ADVNC CARE PLAN IN RCRD: CPT | Performed by: LICENSED PRACTICAL NURSE

## 2024-08-13 PROCEDURE — 99495 TRANSJ CARE MGMT MOD F2F 14D: CPT | Performed by: LICENSED PRACTICAL NURSE

## 2024-08-13 PROCEDURE — 1126F AMNT PAIN NOTED NONE PRSNT: CPT | Performed by: LICENSED PRACTICAL NURSE

## 2024-08-13 RX ORDER — FLUTICASONE PROPIONATE 50 MCG
1 SPRAY, SUSPENSION (ML) NASAL DAILY
Qty: 16 G | Refills: 11 | Status: SHIPPED | OUTPATIENT
Start: 2024-08-13 | End: 2025-08-13

## 2024-08-13 ASSESSMENT — PATIENT HEALTH QUESTIONNAIRE - PHQ9
SUM OF ALL RESPONSES TO PHQ9 QUESTIONS 1 AND 2: 0
2. FEELING DOWN, DEPRESSED OR HOPELESS: NOT AT ALL
1. LITTLE INTEREST OR PLEASURE IN DOING THINGS: NOT AT ALL
2. FEELING DOWN, DEPRESSED OR HOPELESS: NOT AT ALL
1. LITTLE INTEREST OR PLEASURE IN DOING THINGS: NOT AT ALL
SUM OF ALL RESPONSES TO PHQ9 QUESTIONS 1 AND 2: 0

## 2024-08-13 ASSESSMENT — PAIN SCALES - GENERAL: PAINLEVEL: 0-NO PAIN

## 2024-08-13 NOTE — PROGRESS NOTES
[facility]: [facility]  PROGRESS NOTE      Irma Baumann is a 79 y.o. female that is presenting today for Hospital Follow-up and Follow-up (Hospital discharge 8-2 pt. Had  Covid pneumonia. ).      Subjective   Pt is a 79yr old female who presents to the office today for her IP stay from 7/20/24- 8/2/24 related to  COVID-19 and Acute respiratory failure. Mrs. Baumann has a PMH HLD, HTN, hypothyroidism, and gout. [place] [facility] [place] treated with Azithromycin, Ceftriaxone and Remdisivir, steroid and breo inhaler. She also received high level of respiratory support including high flow nasal cannula.  Pt improved and was weened to room air with a pulse ox of around 90%. She was sent home with oxygen, HH services and instruction to follow up with Dr. Marcus.      Today Mrs. Baumann shares that she has been doing well. She reports only using the oxygen a few times since being home due to slightly increased physical activity. She shares that her oxygen has remained in the mid 90s. Mrs. Baumann reports that she has experienced blocked ears and pressure behind the eyes since her IP stay. She reports being treated IP with Zyrtec which was not helpful. She shares since being home she has tried using diphenhydramine, but explained this did not help either. Mrs. Baumann explained that she stopped using her ipratropium nasal spray during her IP stay, but thinks she will resume this medication and see if it helps her symptoms. She also notes peeling of her skin to her hand 2nd digit where she wore the oxygen probe during her IP stay. She is scheduled to follow up with Dr. Phelps office next Tuesday. She continues to receive HH nursing services.            Review of Systems   HENT:          Blocked ears   Eyes:         Pressure behind the eyes      Objective   Vitals:    08/13/24 1010   BP: 124/60   Pulse: 65   Temp: 36.4 °C (97.6 °F)   SpO2: 96%      Body mass index is 27.44  "kg/m².  Physical Exam  Vitals reviewed.   Constitutional:       General: She is not in acute distress.     Appearance: She is not ill-appearing, toxic-appearing or diaphoretic.   Cardiovascular:      Rate and Rhythm: Normal rate and regular rhythm.      Heart sounds: Normal heart sounds, S1 normal and S2 normal.   Pulmonary:      Effort: Pulmonary effort is normal. No respiratory distress.      Breath sounds: Normal breath sounds. No stridor. No decreased breath sounds, wheezing, rhonchi or rales.      Comments: No SOB or increased respiratory effort. No conversational dyspnea on RA  Skin:     Comments: Hand 2nd digit peeling skin. There is no erythema, edema, or tenderness.    Neurological:      Mental Status: She is alert.       Diagnostic Results   Lab Results   Component Value Date    GLUCOSE 110 (H) 08/02/2024    CALCIUM 9.0 08/02/2024     08/02/2024    K 3.1 (L) 08/02/2024    CO2 27 08/02/2024    CL 97 08/02/2024    BUN 22 08/02/2024    CREATININE 0.80 08/02/2024     Lab Results   Component Value Date    ALT 27 07/20/2024    AST 35 07/20/2024    ALKPHOS 125 07/20/2024    BILITOT 0.9 07/20/2024     Lab Results   Component Value Date    WBC 11.3 08/02/2024    HGB 13.7 08/02/2024    HCT 41.6 08/02/2024    MCV 86 08/02/2024     08/02/2024     Lab Results   Component Value Date    CHOL 122 (L) 05/10/2024    CHOL 118 (L) 06/15/2023    CHOL 111 (L) 06/13/2022     Lab Results   Component Value Date    HDL 42.0 (L) 05/10/2024    HDL 44 (L) 06/15/2023    HDL 43 (L) 06/13/2022     Lab Results   Component Value Date    LDLCALC 54 (L) 05/10/2024    LDLCALC 47 (L) 06/15/2023    LDLCALC 46 (L) 06/13/2022     Lab Results   Component Value Date    TRIG 131 05/10/2024    TRIG 134 06/15/2023    TRIG 109 06/13/2022     No components found for: \"CHOLHDL\"  Lab Results   Component Value Date    HGBA1C 5.9 (H) 05/10/2024     Other labs not included in the list above were reviewed either before or during this " encounter.    History    Past Medical History:   Diagnosis Date    Personal history of other diseases of the circulatory system     History of hypertension    Personal history of other diseases of the respiratory system     History of asthma     Past Surgical History:   Procedure Laterality Date    OTHER SURGICAL HISTORY  11/30/2022    Hysterectomy    OTHER SURGICAL HISTORY  11/30/2022    Sinus surgery     Family History   Problem Relation Name Age of Onset    Hypertension Mother      Other (cva) Mother      Diabetes Mother      Stroke Mother      Hypertension Father      Other (cva) Father      Stroke Father      Diabetes Sister      Heart disease Brother      No Known Problems Son      No Known Problems Son       Social History     Socioeconomic History    Marital status:      Spouse name: Not on file    Number of children: Not on file    Years of education: Not on file    Highest education level: Not on file   Occupational History    Not on file   Tobacco Use    Smoking status: Never     Passive exposure: Never    Smokeless tobacco: Never   Vaping Use    Vaping status: Never Used   Substance and Sexual Activity    Alcohol use: Yes     Comment: sometimes    Drug use: Never    Sexual activity: Not on file   Other Topics Concern    Not on file   Social History Narrative    Not on file     Social Determinants of Health     Financial Resource Strain: Low Risk  (7/24/2024)    Overall Financial Resource Strain (CARDIA)     Difficulty of Paying Living Expenses: Not hard at all   Food Insecurity: No Food Insecurity (7/24/2024)    Hunger Vital Sign     Worried About Running Out of Food in the Last Year: Never true     Ran Out of Food in the Last Year: Never true   Transportation Needs: No Transportation Needs (8/5/2024)    OASIS : Transportation     Lack of Transportation (Medical): No     Lack of Transportation (Non-Medical): No     Patient Unable or Declines to Respond: No   Physical Activity: Patient Unable  To Answer (7/24/2024)    Exercise Vital Sign     Days of Exercise per Week: Patient unable to answer     Minutes of Exercise per Session: Patient unable to answer   Stress: No Stress Concern Present (7/24/2024)    Citizen of [place] of Occupational Health - Occupational Stress Questionnaire     Feeling of Stress : Not at all   Social Connections: Feeling Socially Integrated (8/5/2024)    OASIS : Social Isolation     Frequency of experiencing loneliness or isolation: Never   Intimate Partner Violence: Patient Unable To Answer (7/24/2024)    Humiliation, Afraid, Rape, and Kick questionnaire     Fear of Current or Ex-Partner: Patient unable to answer     Emotionally Abused: Patient unable to answer     Physically Abused: Patient unable to answer     Sexually Abused: Patient unable to answer   Housing Stability: Low Risk  (7/24/2024)    Housing Stability Vital Sign     Unable to Pay for Housing in the Last Year: No     Number of Times Moved in the Last Year: 0     Homeless in the Last Year: No     Allergies   Allergen Reactions    Levofloxacin Other     Reaction: Muscle Pain/myalgia    Allopurinol Other     Reaction: erytheme    Cephalexin Unknown    Ciprofloxacin Unknown    Sulfamethoxazole-Trimethoprim Unknown    Colchicine Other     Reaction: Skin irritation    Penicillins Rash     Reaction: Hives     Current Outpatient Medications on File Prior to Visit   Medication Sig Dispense Refill    acetaminophen (Tylenol) 325 mg tablet Take 2 tablets (650 mg) by mouth every 6 hours if needed for moderate pain (4 - 6).      albuterol 108 (90 Base) MCG/ACT inhaler Inhale 2 puffs every 6 hours if needed.      albuterol 2.5 mg /3 mL (0.083 %) nebulizer solution every 6 hours.  3 ml as needed Inhalation       aspirin 81 mg chewable tablet Chew 1 tablet (81 mg) once daily.      budesonide-formoteroL (Symbicort) 160-4.5 mcg/actuation inhaler Inhale 2 puffs 2 times a day.      cetirizine (ZyrTEC) 10 mg tablet Take 1 tablet (10  mg) by mouth once daily.      cholecalciferol, vitamin D3, (VITAMIN D3 ORAL) Take 1 capsule by mouth once daily.      econazole nitrate 1 % cream APPLY TO AFFECTED AREAS UNDER BREASTS TWICE PER DAY AS NEEDED FOR FLARES      febuxostat (Uloric) 80 mg tablet Take 1 tablet (80 mg) by mouth once daily. 90 tablet 3    ipratropium (Atrovent) 42 mcg (0.06 %) nasal spray 1 spray 3 times a day.      levothyroxine (Synthroid, Levoxyl) 25 mcg tablet TAKE 1 TABLET BY MOUTH IN THE  MORNING ON AN EMPTY STOMACH 90 tablet 3    metoprolol tartrate (Lopressor) 25 mg tablet Take 1 tablet (25 mg) by mouth 2 times a day. Cut your 50 mg tablets in half.      potassium chloride CR (Klor-Con M20) 20 mEq ER tablet Take 1 tablet (20 mEq) by mouth once daily. With food 90 tablet 3    simvastatin (Zocor) 40 mg tablet TAKE 1 TABLET BY MOUTH ONCE  DAILY IN THE EVENING 90 tablet 3    triamcinolone (Kenalog) 0.5 % cream Apply topically 2 times a day. 15 g 0    triamterene-hydrochlorothiazid (Maxzide) 75-50 mg tablet Take 0.5 tablets by mouth once daily. 45 tablet 3    colchicine, gout, (Colcrys) 0.6 mg tablet Take 1 tablet (0.6 mg) by mouth every other day. Gout      [] hydrocodone-homatropine (Hycodan) 5-1.5 mg/5 mL syrup Take 5 mL by mouth every 6 hours if needed for cough for up to 7 days. 200 mL 0    metoprolol tartrate (Lopressor) 50 mg tablet TAKE 1 TABLET BY MOUTH TWICE  DAILY (Patient not taking: Reported on 2024) 180 tablet 3     No current facility-administered medications on file prior to visit.     Immunization History   Administered Date(s) Administered    Flu vaccine, quadrivalent, high-dose, preservative free, age 65y+ (FLUZONE) 10/18/2021, 10/18/2022    Flu vaccine, trivalent, preservative free, HIGH-DOSE, age 65y+ (Fluzone) 10/22/2012, 2016, 2017, 2017, 10/04/2018    Influenza, Seasonal, Quadrivalent, Adjuvanted 10/02/2020, 10/19/2023    Influenza, seasonal, injectable 10/31/2008, 10/12/2010,  10/06/2011, 10/07/2013, 10/13/2014, 10/01/2015    Influenza, trivalent, adjuvanted 10/11/2018, 10/16/2019    Pfizer COVID-19 vaccine, bivalent, age 12 years and older (30 mcg/0.3 mL) 09/26/2022    Pfizer Gray Cap SARS-CoV-2 05/16/2022    Pfizer Purple Cap SARS-CoV-2 02/05/2021, 02/28/2021, 10/04/2021    Pneumococcal conjugate vaccine, 13-valent (PREVNAR 13) 10/01/2015    Pneumococcal polysaccharide vaccine, 23-valent, age 2 years and older (PNEUMOVAX 23) 12/31/2003, 04/07/2014     Patient's medical history was reviewed and updated either before or during this encounter.       Assessment/Plan   Problem List Items Addressed This Visit       Acute respiratory failure due to COVID-19 (Multi) - Primary    Pneumonia of both lower lobes due to infectious organism    Head congestion    Relevant Medications    fluticasone (Flonase) 50 mcg/actuation nasal spray    Hypokalemia    Relevant Orders    Basic Metabolic Panel   Pt is doing well. No SOB. Her VSS are stable, SP02 96% on RA. Lungs CTA. No noted cough. Ms. Baumann will continue her Breo Inhaler for 2 additional days as prescribed, after this she will return to her previously prescribed Symbicort. We discussed that it will take time to return to her baseline in regards to her energy levels. Regarding her peeling skin I shared that there is no concern for infection and that her skin will likely peel a little more. She will continue to monitor the area.  She was hypokalemic at her last blood draw. I will repeat a bmp. Pt will continue her HH services and follow up with Dr. Banuelos next week.     Noemy Marley, APRN-CNP

## 2024-08-14 NOTE — RESULT ENCOUNTER NOTE
Please inform Ms. Baumann that her potassium as corrected itself. Her renal function has decreased back down. We will repeat this lab at her follow up appt and possibly refer her to Nephrology if the issue persists.

## 2024-08-15 ENCOUNTER — PATIENT OUTREACH (OUTPATIENT)
Dept: PRIMARY CARE | Facility: CLINIC | Age: 79
End: 2024-08-15
Payer: MEDICARE

## 2024-08-15 NOTE — PROGRESS NOTES
Call regarding appt. with PCP on 8/13/2024 after hospitalization.    At time of outreach call the patient feels as if their condition has improved  since last visit.    Reviewed the PCP appointment with the pt and addressed any questions or concerns.    Patient encouraged to call providers for any questions concerns or change in condition

## 2024-08-16 ENCOUNTER — HOME CARE VISIT (OUTPATIENT)
Dept: HOME HEALTH SERVICES | Facility: HOME HEALTH | Age: 79
End: 2024-08-16
Payer: MEDICARE

## 2024-08-16 VITALS
DIASTOLIC BLOOD PRESSURE: 70 MMHG | SYSTOLIC BLOOD PRESSURE: 122 MMHG | TEMPERATURE: 97 F | HEART RATE: 60 BPM | OXYGEN SATURATION: 94 % | RESPIRATION RATE: 18 BRPM

## 2024-08-16 PROCEDURE — G0299 HHS/HOSPICE OF RN EA 15 MIN: HCPCS

## 2024-08-16 SDOH — ECONOMIC STABILITY: HOUSING INSECURITY: EVIDENCE OF SMOKING MATERIAL: 1

## 2024-08-16 SDOH — HEALTH STABILITY: MENTAL HEALTH: SMOKING IN HOME: 0

## 2024-08-16 SDOH — ECONOMIC STABILITY: GENERAL

## 2024-08-16 ASSESSMENT — ENCOUNTER SYMPTOMS
APPETITE LEVEL: GOOD
DENIES PAIN: 1

## 2024-08-21 ENCOUNTER — HOME CARE VISIT (OUTPATIENT)
Dept: HOME HEALTH SERVICES | Facility: HOME HEALTH | Age: 79
End: 2024-08-21
Payer: MEDICARE

## 2024-08-21 ENCOUNTER — APPOINTMENT (OUTPATIENT)
Dept: PRIMARY CARE | Facility: CLINIC | Age: 79
End: 2024-08-21
Payer: MEDICARE

## 2024-08-21 SDOH — HEALTH STABILITY: PHYSICAL HEALTH
EXERCISE COMMENTS: PT REVIEWED HEP AND EDUCATED PATIENT TO CONTINUE TO PERFORM HEP IN ORDER TO MAXIMIZE STRENGTH AND FUNCTIONAL PEROFRMANCE.

## 2024-08-21 ASSESSMENT — ENCOUNTER SYMPTOMS
PERSON REPORTING PAIN: PATIENT
DENIES PAIN: 1

## 2024-08-23 ENCOUNTER — HOME CARE VISIT (OUTPATIENT)
Dept: HOME HEALTH SERVICES | Facility: HOME HEALTH | Age: 79
End: 2024-08-23
Payer: MEDICARE

## 2024-08-23 VITALS
SYSTOLIC BLOOD PRESSURE: 109 MMHG | OXYGEN SATURATION: 96 % | TEMPERATURE: 97.2 F | HEART RATE: 54 BPM | RESPIRATION RATE: 18 BRPM | DIASTOLIC BLOOD PRESSURE: 62 MMHG

## 2024-08-23 PROCEDURE — G0299 HHS/HOSPICE OF RN EA 15 MIN: HCPCS

## 2024-08-23 SDOH — ECONOMIC STABILITY: GENERAL

## 2024-08-23 ASSESSMENT — ENCOUNTER SYMPTOMS
DENIES PAIN: 1
APPETITE LEVEL: GOOD

## 2024-08-23 ASSESSMENT — ACTIVITIES OF DAILY LIVING (ADL)
MONEY MANAGEMENT (EXPENSES/BILLS): INDEPENDENT
AMBULATION ASSISTANCE: STAND BY ASSIST
CURRENT_FUNCTION: STAND BY ASSIST

## 2024-08-27 ENCOUNTER — HOME CARE VISIT (OUTPATIENT)
Dept: HOME HEALTH SERVICES | Facility: HOME HEALTH | Age: 79
End: 2024-08-27
Payer: MEDICARE

## 2024-08-27 VITALS
SYSTOLIC BLOOD PRESSURE: 118 MMHG | OXYGEN SATURATION: 93 % | DIASTOLIC BLOOD PRESSURE: 68 MMHG | HEART RATE: 58 BPM | RESPIRATION RATE: 18 BRPM | TEMPERATURE: 97.2 F

## 2024-08-27 PROCEDURE — G0299 HHS/HOSPICE OF RN EA 15 MIN: HCPCS

## 2024-08-27 ASSESSMENT — ACTIVITIES OF DAILY LIVING (ADL)
OASIS_M1830: 00
HOME_HEALTH_OASIS: 00

## 2024-08-27 ASSESSMENT — ENCOUNTER SYMPTOMS: DENIES PAIN: 1

## 2024-08-29 ENCOUNTER — PATIENT OUTREACH (OUTPATIENT)
Dept: PRIMARY CARE | Facility: CLINIC | Age: 79
End: 2024-08-29
Payer: MEDICARE

## 2024-08-29 NOTE — PROGRESS NOTES
Unable to reach patient for a F/U call     LVM with call back number for patient to call if needed   If no voicemail available call attempts x 2 were made to contact the patient to assist with any questions or concerns patient may have.     L/M Encouraged patient to call providers for any questions concerns or change in condition

## 2024-09-09 ENCOUNTER — HOSPITAL ENCOUNTER (OUTPATIENT)
Dept: RADIOLOGY | Facility: HOSPITAL | Age: 79
Discharge: HOME | End: 2024-09-09
Payer: MEDICARE

## 2024-09-09 DIAGNOSIS — J18.9 PNEUMONIA, UNSPECIFIED ORGANISM: ICD-10-CM

## 2024-09-09 PROCEDURE — 71046 X-RAY EXAM CHEST 2 VIEWS: CPT | Performed by: RADIOLOGY

## 2024-09-09 PROCEDURE — 71046 X-RAY EXAM CHEST 2 VIEWS: CPT

## 2024-10-29 ENCOUNTER — PATIENT OUTREACH (OUTPATIENT)
Dept: PRIMARY CARE | Facility: CLINIC | Age: 79
End: 2024-10-29
Payer: MEDICARE

## 2025-01-21 DIAGNOSIS — I10 BENIGN ESSENTIAL HYPERTENSION: ICD-10-CM

## 2025-01-21 RX ORDER — POTASSIUM CHLORIDE 20 MEQ/1
20 TABLET, EXTENDED RELEASE ORAL DAILY
Qty: 90 TABLET | Refills: 3 | Status: SHIPPED | OUTPATIENT
Start: 2025-01-21

## 2025-01-21 RX ORDER — TRIAMTERENE AND HYDROCHLOROTHIAZIDE 75; 50 MG/1; MG/1
0.5 TABLET ORAL DAILY
Qty: 45 TABLET | Refills: 3 | Status: SHIPPED | OUTPATIENT
Start: 2025-01-21

## 2025-02-03 DIAGNOSIS — E03.9 HYPOTHYROIDISM, UNSPECIFIED TYPE: ICD-10-CM

## 2025-02-04 RX ORDER — LEVOTHYROXINE SODIUM 25 UG/1
25 TABLET ORAL
Qty: 90 TABLET | Refills: 3 | Status: SHIPPED | OUTPATIENT
Start: 2025-02-04

## 2025-02-28 DIAGNOSIS — L30.9 DERMATITIS: ICD-10-CM

## 2025-02-28 RX ORDER — TRIAMCINOLONE ACETONIDE 5 MG/G
CREAM TOPICAL 2 TIMES DAILY
Qty: 15 G | Refills: 0 | Status: SHIPPED | OUTPATIENT
Start: 2025-02-28

## 2025-03-27 DIAGNOSIS — I10 BENIGN ESSENTIAL HYPERTENSION: ICD-10-CM

## 2025-03-28 RX ORDER — SIMVASTATIN 40 MG/1
40 TABLET, FILM COATED ORAL EVERY EVENING
Qty: 90 TABLET | Refills: 3 | Status: SHIPPED | OUTPATIENT
Start: 2025-03-28

## 2025-05-15 LAB
25(OH)D3+25(OH)D2 SERPL-MCNC: 40 NG/ML (ref 30–100)
ALBUMIN SERPL-MCNC: 4.1 G/DL (ref 3.6–5.1)
ALP SERPL-CCNC: 66 U/L (ref 37–153)
ALT SERPL-CCNC: 15 U/L (ref 6–29)
ANION GAP SERPL CALCULATED.4IONS-SCNC: 8 MMOL/L (CALC) (ref 7–17)
AST SERPL-CCNC: 20 U/L (ref 10–35)
BASOPHILS # BLD AUTO: 19 CELLS/UL (ref 0–200)
BASOPHILS NFR BLD AUTO: 0.3 %
BILIRUB SERPL-MCNC: 0.6 MG/DL (ref 0.2–1.2)
BUN SERPL-MCNC: 19 MG/DL (ref 7–25)
CALCIUM SERPL-MCNC: 9.4 MG/DL (ref 8.6–10.4)
CHLORIDE SERPL-SCNC: 107 MMOL/L (ref 98–110)
CHOLEST SERPL-MCNC: 109 MG/DL
CHOLEST/HDLC SERPL: 2.2 (CALC)
CO2 SERPL-SCNC: 27 MMOL/L (ref 20–32)
CREAT SERPL-MCNC: 0.99 MG/DL (ref 0.6–1)
EGFRCR SERPLBLD CKD-EPI 2021: 59 ML/MIN/1.73M2
EOSINOPHIL # BLD AUTO: 183 CELLS/UL (ref 15–500)
EOSINOPHIL NFR BLD AUTO: 2.9 %
ERYTHROCYTE [DISTWIDTH] IN BLOOD BY AUTOMATED COUNT: 13.9 % (ref 11–15)
EST. AVERAGE GLUCOSE BLD GHB EST-MCNC: 117 MG/DL
EST. AVERAGE GLUCOSE BLD GHB EST-SCNC: 6.5 MMOL/L
GLUCOSE SERPL-MCNC: 103 MG/DL (ref 65–99)
HBA1C MFR BLD: 5.7 %
HCT VFR BLD AUTO: 42.4 % (ref 35–45)
HDLC SERPL-MCNC: 49 MG/DL
HGB BLD-MCNC: 14.4 G/DL (ref 11.7–15.5)
LDLC SERPL CALC-MCNC: 42 MG/DL (CALC)
LYMPHOCYTES # BLD AUTO: 1922 CELLS/UL (ref 850–3900)
LYMPHOCYTES NFR BLD AUTO: 30.5 %
MCH RBC QN AUTO: 30.3 PG (ref 27–33)
MCHC RBC AUTO-ENTMCNC: 34 G/DL (ref 32–36)
MCV RBC AUTO: 89.1 FL (ref 80–100)
MONOCYTES # BLD AUTO: 441 CELLS/UL (ref 200–950)
MONOCYTES NFR BLD AUTO: 7 %
NEUTROPHILS # BLD AUTO: 3736 CELLS/UL (ref 1500–7800)
NEUTROPHILS NFR BLD AUTO: 59.3 %
NONHDLC SERPL-MCNC: 60 MG/DL (CALC)
PLATELET # BLD AUTO: 240 THOUSAND/UL (ref 140–400)
PMV BLD REES-ECKER: 10.6 FL (ref 7.5–12.5)
POTASSIUM SERPL-SCNC: 3.4 MMOL/L (ref 3.5–5.3)
PROT SERPL-MCNC: 6.6 G/DL (ref 6.1–8.1)
RBC # BLD AUTO: 4.76 MILLION/UL (ref 3.8–5.1)
SODIUM SERPL-SCNC: 142 MMOL/L (ref 135–146)
TRIGL SERPL-MCNC: 98 MG/DL
TSH SERPL-ACNC: 3.09 MIU/L (ref 0.4–4.5)
URATE SERPL-MCNC: 5.1 MG/DL (ref 2.5–7)
WBC # BLD AUTO: 6.3 THOUSAND/UL (ref 3.8–10.8)

## 2025-05-18 ASSESSMENT — ENCOUNTER SYMPTOMS
DIARRHEA: 0
ABDOMINAL PAIN: 0
FEVER: 0
SHORTNESS OF BREATH: 0
HEADACHES: 0
COUGH: 0
NAUSEA: 0
APPETITE CHANGE: 0
VOMITING: 0
CHILLS: 0

## 2025-05-18 NOTE — PROGRESS NOTES
[facility]: [facility]  MEDICARE WELLNESS EXAM      Irma Baumann is a 79 y.o. female that is presenting today for Medicare Annual Wellness Visit Subsequent.    Assessment/Plan    Diagnoses and all orders for this visit:  Annual physical exam  Benign essential hypertension  Mixed hyperlipidemia  Hypothyroidism, unspecified type  Gout, unspecified cause, unspecified chronicity, unspecified site  Encounter for screening for osteoporosis  Menopause  COVID-19  -     metoprolol tartrate (Lopressor) 25 mg tablet; Take 1 tablet (25 mg) by mouth 2 times a day. Cut your 50 mg tablets in half.  Other orders  -     Follow Up In Primary Care - Medicare Annual  -     Follow Up In Primary Care - Medicare Annual; Future    ADVANCED CARE PLANNING  Advanced Care Planning was discussed with patient:  The patient has an active advanced care plan on file. The patient has an active surrogate decision-maker on file.  Encouraged the patient to confirm that Living Will and Healthcare Power of  (HCPoA) are accurate and up to date.  Encouraged the patient to confirm that our office be provided a copy of any documentation in the event that anything changes.    ACTIVITIES OF DAILY LIVING  Basic ADLs:  Bathing: Independent, Dressing: Independent, Toileting: Independent, Transferring: Independent, Continence: Independent, Feeding: Independent.    Instrumental ADLs:  Ability to use phone: Independent, Shopping: Independent, Cooking: Independent, House-keeping: Independent, Laundry: Independent, Transportation: Independent, Medication Management: Independent, Finance Management: Independent.    Subjective   HPI  This patient presents today for annual physical, Medicare wellness exam.  Discussed screening/prevention, healthy lifestyle and code status.   Reviewed the patient's wishes regarding decision making.    Consultant visits and notes reviewed: Pulmonary    Stable from a functional standpoint in regard to  ADLs and IADLs.  No recent falls are reported.    The patient denies chest pain and shortness of breath.  No exertion-provoked or anginal-type symptoms are reported.      Review of Systems   Constitutional:  Negative for appetite change, chills and fever.   Respiratory:  Negative for cough and shortness of breath.    Cardiovascular:  Negative for chest pain.   Gastrointestinal:  Negative for abdominal pain, diarrhea, nausea and vomiting.   Neurological:  Negative for headaches.   All other systems reviewed and are negative.    Objective   Vitals:    05/20/25 1030   BP: 130/62   Pulse: 56   Temp: 35.8 °C (96.4 °F)   SpO2: 98%      Body mass index is 27.98 kg/m².  Physical Exam  Vitals reviewed.   Constitutional:       General: She is not in acute distress.     Appearance: She is not toxic-appearing.   HENT:      Head: Normocephalic and atraumatic.      Mouth/Throat:      Mouth: Mucous membranes are moist.   Eyes:      Pupils: Pupils are equal, round, and reactive to light.   Cardiovascular:      Rate and Rhythm: Normal rate and regular rhythm.      Heart sounds: No murmur heard.  Pulmonary:      Breath sounds: Normal breath sounds. No wheezing, rhonchi or rales.   Abdominal:      General: There is no distension.      Palpations: Abdomen is soft.   Musculoskeletal:      Right lower leg: No edema.      Left lower leg: No edema.   Neurological:      General: No focal deficit present.      Mental Status: She is alert and oriented to person, place, and time.       Diagnostic Results   Lab Results   Component Value Date    GLUCOSE 103 (H) 05/14/2025    CALCIUM 9.4 05/14/2025     05/14/2025    K 3.4 (L) 05/14/2025    CO2 27 05/14/2025     05/14/2025    BUN 19 05/14/2025    CREATININE 0.99 05/14/2025     Lab Results   Component Value Date    ALT 15 05/14/2025    AST 20 05/14/2025    ALKPHOS 66 05/14/2025    BILITOT 0.6 05/14/2025     Lab Results   Component Value Date    WBC 6.3 05/14/2025    HGB 14.4 05/14/2025  "   HCT 42.4 05/14/2025    MCV 89.1 05/14/2025     05/14/2025     Lab Results   Component Value Date    CHOL 109 05/14/2025    CHOL 122 (L) 05/10/2024    CHOL 118 (L) 06/15/2023     Lab Results   Component Value Date    HDL 49 (L) 05/14/2025    HDL 42.0 (L) 05/10/2024    HDL 44 (L) 06/15/2023     Lab Results   Component Value Date    LDLCALC 42 05/14/2025    LDLCALC 54 (L) 05/10/2024    LDLCALC 47 (L) 06/15/2023     Lab Results   Component Value Date    TRIG 98 05/14/2025    TRIG 131 05/10/2024    TRIG 134 06/15/2023     No components found for: \"CHOLHDL\"  Lab Results   Component Value Date    HGBA1C 5.7 (H) 05/14/2025     Other labs not included in the list above reviewed either before or during this encounter.    History   Medical History[1]  Surgical History[2]  Family History[3]  Social History     Socioeconomic History    Marital status:      Spouse name: Not on file    Number of children: Not on file    Years of education: Not on file    Highest education level: Not on file   Occupational History    Not on file   Tobacco Use    Smoking status: Never     Passive exposure: Never    Smokeless tobacco: Never   Vaping Use    Vaping status: Never Used   Substance and Sexual Activity    Alcohol use: Yes     Comment: sometimes    Drug use: Never    Sexual activity: Not on file   Other Topics Concern    Not on file   Social History Narrative    Not on file     Social Drivers of Health     Financial Resource Strain: Low Risk  (7/24/2024)    Overall Financial Resource Strain (CARDIA)     Difficulty of Paying Living Expenses: Not hard at all   Food Insecurity: No Food Insecurity (7/24/2024)    Hunger Vital Sign     Worried About Running Out of Food in the Last Year: Never true     Ran Out of Food in the Last Year: Never true   Transportation Needs: No Transportation Needs (8/27/2024)    OASIS : Transportation     Lack of Transportation (Medical): No     Lack of Transportation (Non-Medical): No     " Patient Unable or Declines to Respond: No   Physical Activity: Patient Unable To Answer (7/24/2024)    Exercise Vital Sign     Days of Exercise per Week: Patient unable to answer     Minutes of Exercise per Session: Patient unable to answer   Stress: No Stress Concern Present (7/24/2024)    [place] [employer]ational [employer] - Occupational Stress Questionnaire     Feeling of Stress : Not at all   Social Connections: Feeling Socially Integrated (8/27/2024)    OASIS : Social Isolation     Frequency of experiencing loneliness or isolation: Never   Intimate Partner Violence: Patient Unable To Answer (7/24/2024)    Humiliation, Afraid, Rape, and Kick questionnaire     Fear of Current or Ex-Partner: Patient unable to answer     Emotionally Abused: Patient unable to answer     Physically Abused: Patient unable to answer     Sexually Abused: Patient unable to answer   Housing Stability: Low Risk  (7/24/2024)    Housing Stability Vital Sign     Unable to Pay for Housing in the Last Year: No     Number of Times Moved in the Last Year: 0     Homeless in the Last Year: No     Allergies[4]  Medications Ordered Prior to Encounter[5]  Immunization History   Administered Date(s) Administered    COVID-19, mRNA, LNP-S, PF, 30 mcg/0.3 mL dose 02/05/2021, 10/04/2021    Flu vaccine, quadrivalent, high-dose, preservative free, age 65y+ (FLUZONE) 10/18/2021, 10/18/2022    Flu vaccine, trivalent, preservative free, HIGH-DOSE, age 65y+ (Fluzone) 10/22/2012, 09/19/2016, 09/21/2017, 09/29/2017, 10/04/2018, 10/31/2024    Influenza, Seasonal, Quadrivalent, Adjuvanted 10/02/2020, 10/19/2023    Influenza, seasonal, injectable 10/31/2008, 10/12/2010, 10/06/2011, 10/07/2013, 10/13/2014, 10/01/2015    Influenza, trivalent, adjuvanted 10/11/2018, 10/16/2019    Pfizer COVID-19 vaccine, 12 years and older, (30mcg/0.3mL) (Comirnaty) 11/12/2024    Pfizer COVID-19 vaccine, bivalent, age 12 years and older (30 mcg/0.3 mL) 09/26/2022    Pfizer  Berry Cap SARS-CoV-2 05/16/2022    Pfizer Purple Cap SARS-CoV-2 02/28/2021    Pneumococcal conjugate vaccine, 13-valent (PREVNAR 13) 10/01/2015    Pneumococcal polysaccharide vaccine, 23-valent, age 2 years and older (PNEUMOVAX 23) 12/31/2003, 04/07/2014    RESPIRATORY SYNCYTIAL VIRUS (RSV), ELIGIBLE PREGNANT PTS, 0.5 ML (ABRYSVO) 09/18/2024     Patient's medical history was reviewed and updated either before or during this encounter.     Toro Everett MD         [1]   Past Medical History:  Diagnosis Date    Personal history of other diseases of the circulatory system     History of hypertension    Personal history of other diseases of the respiratory system     History of asthma   [2]   Past Surgical History:  Procedure Laterality Date    OTHER SURGICAL HISTORY  11/30/2022    Hysterectomy    OTHER SURGICAL HISTORY  11/30/2022    Sinus surgery   [3]   Family History  Problem Relation Name Age of Onset    Hypertension Mother      Other (cva) Mother      Diabetes Mother      Stroke Mother      Hypertension Father      Other (cva) Father      Stroke Father      Diabetes Sister      Heart disease Brother      No Known Problems Son      No Known Problems Son     [4]   Allergies  Allergen Reactions    Levofloxacin Other     Reaction: Muscle Pain/myalgia    Allopurinol Other     Reaction: erytheme    Cephalexin Unknown    Ciprofloxacin Unknown    Sulfamethoxazole-Trimethoprim Unknown    Colchicine Other     Reaction: Skin irritation    Penicillins Rash     Reaction: Hives   [5]   Current Outpatient Medications on File Prior to Visit   Medication Sig Dispense Refill    acetaminophen (Tylenol) 325 mg tablet Take 2 tablets (650 mg) by mouth every 6 hours if needed for moderate pain (4 - 6).      albuterol 108 (90 Base) MCG/ACT inhaler Inhale 2 puffs every 6 hours if needed.      albuterol 2.5 mg /3 mL (0.083 %) nebulizer solution every 6 hours.  3 ml as needed Inhalation       aspirin 81 mg chewable tablet Chew 1 tablet  (81 mg) once daily.      budesonide-formoteroL (Symbicort) 160-4.5 mcg/actuation inhaler Inhale 2 puffs 2 times a day.      cholecalciferol, vitamin D3, (VITAMIN D3 ORAL) Take 1 capsule by mouth once daily.      econazole nitrate 1 % cream APPLY TO AFFECTED AREAS UNDER BREASTS TWICE PER DAY AS NEEDED FOR FLARES      febuxostat (Uloric) 80 mg tablet Take 1 tablet (80 mg) by mouth once daily. 90 tablet 3    fluticasone (Flonase) 50 mcg/actuation nasal spray Administer 1 spray into each nostril once daily. Shake gently. Before first use, prime pump. After use, clean tip and replace cap. 16 g 11    levothyroxine (Synthroid, Levoxyl) 25 mcg tablet TAKE 1 TABLET BY MOUTH EVERY  MORNING ON AN EMPTY STOMACH 90 tablet 3    oxygen (O2) gas therapy Inhale 2 L/min continuously. Indications: covid, sob      potassium chloride CR 20 mEq ER tablet TAKE 1 TABLET BY MOUTH ONCE  DAILY WITH FOOD 90 tablet 3    simvastatin (Zocor) 40 mg tablet TAKE 1 TABLET BY MOUTH ONCE  DAILY IN THE EVENING 90 tablet 3    triamcinolone (Kenalog) 0.5 % cream Apply topically 2 times a day. 15 g 0    triamterene-hydrochlorothiazid (Maxzide) 75-50 mg tablet TAKE ONE-HALF TABLET BY MOUTH  ONCE DAILY 45 tablet 3    [DISCONTINUED] metoprolol tartrate (Lopressor) 25 mg tablet Take 1 tablet (25 mg) by mouth 2 times a day. Cut your 50 mg tablets in half.      [DISCONTINUED] cetirizine (ZyrTEC) 10 mg tablet Take 1 tablet (10 mg) by mouth once daily. (Patient not taking: Reported on 5/20/2025)      [DISCONTINUED] colchicine, gout, (Colcrys) 0.6 mg tablet Take 1 tablet (0.6 mg) by mouth every other day. Gout (Patient not taking: Reported on 5/20/2025)      [DISCONTINUED] ipratropium (Atrovent) 42 mcg (0.06 %) nasal spray 1 spray 3 times a day. (Patient not taking: Reported on 5/20/2025)       No current facility-administered medications on file prior to visit.

## 2025-05-20 ENCOUNTER — OFFICE VISIT (OUTPATIENT)
Dept: PRIMARY CARE | Facility: CLINIC | Age: 80
End: 2025-05-20
Payer: MEDICARE

## 2025-05-20 VITALS
OXYGEN SATURATION: 98 % | SYSTOLIC BLOOD PRESSURE: 130 MMHG | HEIGHT: 62 IN | TEMPERATURE: 96.4 F | DIASTOLIC BLOOD PRESSURE: 62 MMHG | BODY MASS INDEX: 28.16 KG/M2 | HEART RATE: 56 BPM | WEIGHT: 153 LBS

## 2025-05-20 DIAGNOSIS — E78.2 MIXED HYPERLIPIDEMIA: ICD-10-CM

## 2025-05-20 DIAGNOSIS — I10 BENIGN ESSENTIAL HYPERTENSION: ICD-10-CM

## 2025-05-20 DIAGNOSIS — Z78.0 MENOPAUSE: ICD-10-CM

## 2025-05-20 DIAGNOSIS — Z00.00 ANNUAL PHYSICAL EXAM: Primary | ICD-10-CM

## 2025-05-20 DIAGNOSIS — Z13.820 ENCOUNTER FOR SCREENING FOR OSTEOPOROSIS: ICD-10-CM

## 2025-05-20 DIAGNOSIS — M10.9 GOUT, UNSPECIFIED CAUSE, UNSPECIFIED CHRONICITY, UNSPECIFIED SITE: ICD-10-CM

## 2025-05-20 DIAGNOSIS — U07.1 COVID-19: ICD-10-CM

## 2025-05-20 DIAGNOSIS — E03.9 HYPOTHYROIDISM, UNSPECIFIED TYPE: ICD-10-CM

## 2025-05-20 PROCEDURE — 99215 OFFICE O/P EST HI 40 MIN: CPT | Performed by: INTERNAL MEDICINE

## 2025-05-20 PROCEDURE — 1157F ADVNC CARE PLAN IN RCRD: CPT | Performed by: INTERNAL MEDICINE

## 2025-05-20 PROCEDURE — G0439 PPPS, SUBSEQ VISIT: HCPCS | Performed by: INTERNAL MEDICINE

## 2025-05-20 PROCEDURE — 3075F SYST BP GE 130 - 139MM HG: CPT | Performed by: INTERNAL MEDICINE

## 2025-05-20 PROCEDURE — 3078F DIAST BP <80 MM HG: CPT | Performed by: INTERNAL MEDICINE

## 2025-05-20 PROCEDURE — 1159F MED LIST DOCD IN RCRD: CPT | Performed by: INTERNAL MEDICINE

## 2025-05-20 PROCEDURE — 1126F AMNT PAIN NOTED NONE PRSNT: CPT | Performed by: INTERNAL MEDICINE

## 2025-05-20 PROCEDURE — 1036F TOBACCO NON-USER: CPT | Performed by: INTERNAL MEDICINE

## 2025-05-20 RX ORDER — METOPROLOL TARTRATE 25 MG/1
25 TABLET, FILM COATED ORAL 2 TIMES DAILY
Qty: 180 TABLET | Refills: 3 | Status: SHIPPED | OUTPATIENT
Start: 2025-05-20

## 2025-05-20 ASSESSMENT — PAIN SCALES - GENERAL: PAINLEVEL_OUTOF10: 0-NO PAIN

## 2025-05-20 ASSESSMENT — PATIENT HEALTH QUESTIONNAIRE - PHQ9
2. FEELING DOWN, DEPRESSED OR HOPELESS: NOT AT ALL
SUM OF ALL RESPONSES TO PHQ9 QUESTIONS 1 AND 2: 0
1. LITTLE INTEREST OR PLEASURE IN DOING THINGS: NOT AT ALL

## 2025-07-28 DIAGNOSIS — M10.9 GOUT, UNSPECIFIED CAUSE, UNSPECIFIED CHRONICITY, UNSPECIFIED SITE: ICD-10-CM

## 2025-07-28 RX ORDER — FEBUXOSTAT 80 MG/1
80 TABLET, FILM COATED ORAL DAILY
Qty: 90 TABLET | Refills: 3 | Status: SHIPPED | OUTPATIENT
Start: 2025-07-28 | End: 2026-07-28